# Patient Record
Sex: FEMALE | Race: WHITE | NOT HISPANIC OR LATINO | Employment: OTHER | URBAN - METROPOLITAN AREA
[De-identification: names, ages, dates, MRNs, and addresses within clinical notes are randomized per-mention and may not be internally consistent; named-entity substitution may affect disease eponyms.]

---

## 2021-03-22 ENCOUNTER — OFFICE VISIT (OUTPATIENT)
Dept: SPORTS MEDICINE | Facility: CLINIC | Age: 76
End: 2021-03-22

## 2021-03-22 VITALS
RESPIRATION RATE: 16 BRPM | DIASTOLIC BLOOD PRESSURE: 82 MMHG | OXYGEN SATURATION: 98 % | BODY MASS INDEX: 30.78 KG/M2 | TEMPERATURE: 98.6 F | WEIGHT: 163 LBS | SYSTOLIC BLOOD PRESSURE: 136 MMHG | HEIGHT: 61 IN | HEART RATE: 66 BPM

## 2021-03-22 DIAGNOSIS — M24.159 LABRAL TEAR OF HIP, DEGENERATIVE: ICD-10-CM

## 2021-03-22 DIAGNOSIS — M16.12 ARTHRITIS OF LEFT HIP: Primary | ICD-10-CM

## 2021-03-22 PROCEDURE — 99204 OFFICE O/P NEW MOD 45 MIN: CPT | Performed by: FAMILY MEDICINE

## 2021-03-22 RX ORDER — OLANZAPINE 5 MG/1
5 TABLET ORAL NIGHTLY
COMMUNITY
End: 2022-03-10

## 2021-03-22 RX ORDER — SERTRALINE HYDROCHLORIDE 100 MG/1
100 TABLET, FILM COATED ORAL DAILY
COMMUNITY
End: 2022-06-21 | Stop reason: SDUPTHER

## 2021-03-22 RX ORDER — RISEDRONATE SODIUM 150 MG/1
150 TABLET, FILM COATED ORAL
COMMUNITY
End: 2022-04-18 | Stop reason: SDUPTHER

## 2021-03-22 RX ORDER — POTASSIUM CHLORIDE 1500 MG/1
20 TABLET, FILM COATED, EXTENDED RELEASE ORAL DAILY
COMMUNITY
Start: 2021-01-06 | End: 2022-03-07

## 2021-03-22 RX ORDER — DILTIAZEM HYDROCHLORIDE 180 MG/1
180 CAPSULE, COATED, EXTENDED RELEASE ORAL DAILY
COMMUNITY
Start: 2021-03-18 | End: 2022-04-01 | Stop reason: SDUPTHER

## 2021-03-22 RX ORDER — ROSUVASTATIN CALCIUM 20 MG/1
20 TABLET, COATED ORAL DAILY
COMMUNITY
End: 2022-04-18 | Stop reason: SDUPTHER

## 2021-03-22 NOTE — PROGRESS NOTES
"Chief Complaint  Hip Pain (Left hip pain here today with MRI)    Subjective          Hilary FARIA presents to [facility] SPORTS MEDICINE  History of Present Illness  The past year patient has been having left anterior hip pain.  No known trauma.  She has occasionally felt like he is going to give way but she has not had any falls.  No locking.  No fever chills.  Patient saw her PCP earlier this month and an MRI was done on 3/15/2021 showing moderate arthritis with a degenerative labral tear.  Patient was referred here for further evaluation and treatment options.  Patient is on Xarelto and she has a history of type 2 diabetes.  She was taking tramadol for her pain but has since changed over to Tylenol.    Objective   Vital Signs:   /82 (BP Location: Left arm, Patient Position: Sitting, Cuff Size: Adult)   Pulse 66   Temp 98.6 °F (37 °C)   Resp 16   Ht 154.9 cm (61\")   Wt 73.9 kg (163 lb)   SpO2 98%   BMI 30.80 kg/m²     Physical Exam  Vitals reviewed.   Constitutional:       Appearance: She is well-developed.   HENT:      Head: Normocephalic and atraumatic.   Eyes:      Conjunctiva/sclera: Conjunctivae normal.      Pupils: Pupils are equal, round, and reactive to light.   Cardiovascular:      Comments: No peripheral edema  Pulmonary:      Effort: Pulmonary effort is normal.   Musculoskeletal:      Comments: Patient is able to get out of chair without any assistance, able to get onto exam table without complaint.  Left hip, negative logroll, patient does have anterior pain with external rotation but good range of motion, however her internal rotation motion is decreased and causes pain.  Equivocal FADIR.   Skin:     General: Skin is warm and dry.   Neurological:      Mental Status: She is alert and oriented to person, place, and time.   Psychiatric:         Behavior: Behavior normal.     Reviewed MRI images from 3/15/2021 of left hip showing moderate arthritis and possible degenerative " labral tear.  Result Review :                SCANNED - IMAGING (03/22/2021)    Assessment and Plan    Diagnoses and all orders for this visit:    1. Arthritis of left hip (Primary)    2. Labral tear of hip, degenerative    Other orders  -     Cancel: SCANNED - IMAGING  -     SCANNED - IMAGING    Treatment options were discussed with the patient, I do think the majority of her pain is coming from hip arthritis and the arthritis is caused a degenerative labral tear.  Treatment options as far as oral medications are going to be limited due to her use of Xarelto and having type 2 diabetes.  Have reviewed with the patient other options including physical therapy, ultrasound-guided intra-articular steroid injection left hip, or fluoroscopic guided steroid injection of the left hip.  Patient would like to try physical therapy first and then if a hip injection is needed she will consider having this done at [facility] under fluoroscopy.  A handwritten order for physical therapy was given to the patient and I have found a [facility] location that is close to her home in [place].  She will call when and if she needs steroid injection.    Follow Up   No follow-ups on file.  Patient was given instructions and counseling regarding her condition or for health maintenance advice. Please see specific information pulled into the AVS if appropriate.

## 2021-03-24 ENCOUNTER — TELEPHONE (OUTPATIENT)
Dept: SPORTS MEDICINE | Facility: CLINIC | Age: 76
End: 2021-03-24

## 2021-03-24 NOTE — TELEPHONE ENCOUNTER
Caller: DR. THORNTON    Relationship: REFERRING        What form or medical record are you requesting: OFFICE NOTE FROM 03/22/21     Who is requesting this form or medical record from you: REFERRING DR. Zahraa HERNANDEZ'S OFFICE  PLEASE FAX [phone]

## 2021-12-06 ENCOUNTER — LAB (OUTPATIENT)
Dept: LAB | Facility: HOSPITAL | Age: 76
End: 2021-12-06

## 2021-12-06 ENCOUNTER — TRANSCRIBE ORDERS (OUTPATIENT)
Dept: LAB | Facility: HOSPITAL | Age: 76
End: 2021-12-06

## 2021-12-06 DIAGNOSIS — R79.9 ABNORMAL BLOOD CHEMISTRY: Primary | ICD-10-CM

## 2021-12-06 DIAGNOSIS — R79.9 ABNORMAL BLOOD CHEMISTRY: ICD-10-CM

## 2021-12-06 PROCEDURE — 36415 COLL VENOUS BLD VENIPUNCTURE: CPT

## 2021-12-06 PROCEDURE — 80053 COMPREHEN METABOLIC PANEL: CPT

## 2021-12-07 LAB
ALBUMIN SERPL-MCNC: 4.1 G/DL (ref 3.5–5.2)
ALBUMIN/GLOB SERPL: 1.5 G/DL
ALP SERPL-CCNC: 80 U/L (ref 39–117)
ALT SERPL W P-5'-P-CCNC: 26 U/L (ref 1–33)
ANION GAP SERPL CALCULATED.3IONS-SCNC: 17 MMOL/L (ref 5–15)
AST SERPL-CCNC: 24 U/L (ref 1–32)
BILIRUB SERPL-MCNC: 0.4 MG/DL (ref 0–1.2)
BUN SERPL-MCNC: 14 MG/DL (ref 8–23)
BUN/CREAT SERPL: 9 (ref 7–25)
CALCIUM SPEC-SCNC: 9.2 MG/DL (ref 8.6–10.5)
CHLORIDE SERPL-SCNC: 98 MMOL/L (ref 98–107)
CO2 SERPL-SCNC: 24 MMOL/L (ref 22–29)
CREAT SERPL-MCNC: 1.56 MG/DL (ref 0.57–1)
GFR SERPL CREATININE-BSD FRML MDRD: 32 ML/MIN/1.73
GLOBULIN UR ELPH-MCNC: 2.7 GM/DL
GLUCOSE SERPL-MCNC: 260 MG/DL (ref 65–99)
POTASSIUM SERPL-SCNC: 2.7 MMOL/L (ref 3.5–5.2)
PROT SERPL-MCNC: 6.8 G/DL (ref 6–8.5)
SODIUM SERPL-SCNC: 139 MMOL/L (ref 136–145)

## 2021-12-27 ENCOUNTER — TRANSCRIBE ORDERS (OUTPATIENT)
Dept: LAB | Facility: HOSPITAL | Age: 76
End: 2021-12-27

## 2021-12-27 ENCOUNTER — LAB (OUTPATIENT)
Dept: LAB | Facility: HOSPITAL | Age: 76
End: 2021-12-27

## 2021-12-27 DIAGNOSIS — E87.6 HYPOKALEMIA: ICD-10-CM

## 2021-12-27 DIAGNOSIS — E87.6 HYPOKALEMIA: Primary | ICD-10-CM

## 2021-12-27 LAB
ALBUMIN SERPL-MCNC: 3.3 G/DL (ref 3.5–5.2)
ALBUMIN/GLOB SERPL: 1.2 G/DL
ALP SERPL-CCNC: 71 U/L (ref 39–117)
ALT SERPL W P-5'-P-CCNC: 12 U/L (ref 1–33)
ANION GAP SERPL CALCULATED.3IONS-SCNC: 11.8 MMOL/L (ref 5–15)
AST SERPL-CCNC: 15 U/L (ref 1–32)
BILIRUB SERPL-MCNC: 0.4 MG/DL (ref 0–1.2)
BUN SERPL-MCNC: 14 MG/DL (ref 8–23)
BUN/CREAT SERPL: 9.6 (ref 7–25)
CALCIUM SPEC-SCNC: 9.1 MG/DL (ref 8.6–10.5)
CHLORIDE SERPL-SCNC: 109 MMOL/L (ref 98–107)
CO2 SERPL-SCNC: 20.2 MMOL/L (ref 22–29)
CREAT SERPL-MCNC: 1.46 MG/DL (ref 0.57–1)
GFR SERPL CREATININE-BSD FRML MDRD: 35 ML/MIN/1.73
GLOBULIN UR ELPH-MCNC: 2.8 GM/DL
GLUCOSE SERPL-MCNC: 193 MG/DL (ref 65–99)
POTASSIUM SERPL-SCNC: 3.1 MMOL/L (ref 3.5–5.2)
PROT SERPL-MCNC: 6.1 G/DL (ref 6–8.5)
SODIUM SERPL-SCNC: 141 MMOL/L (ref 136–145)

## 2021-12-27 PROCEDURE — 80053 COMPREHEN METABOLIC PANEL: CPT

## 2022-01-17 ENCOUNTER — OFFICE VISIT (OUTPATIENT)
Dept: CARDIOLOGY | Facility: CLINIC | Age: 77
End: 2022-01-17

## 2022-01-17 VITALS
WEIGHT: 150 LBS | HEIGHT: 62 IN | HEART RATE: 65 BPM | BODY MASS INDEX: 27.6 KG/M2 | SYSTOLIC BLOOD PRESSURE: 125 MMHG | DIASTOLIC BLOOD PRESSURE: 49 MMHG

## 2022-01-17 DIAGNOSIS — E78.2 HYPERLIPEMIA, MIXED: ICD-10-CM

## 2022-01-17 DIAGNOSIS — I48.91 ATRIAL FIBRILLATION, UNSPECIFIED TYPE: Primary | ICD-10-CM

## 2022-01-17 DIAGNOSIS — I10 ESSENTIAL HYPERTENSION: ICD-10-CM

## 2022-01-17 PROCEDURE — 99204 OFFICE O/P NEW MOD 45 MIN: CPT | Performed by: INTERNAL MEDICINE

## 2022-01-17 RX ORDER — LORATADINE 10 MG/1
10 TABLET ORAL DAILY
COMMUNITY
Start: 2021-10-02 | End: 2022-03-10

## 2022-01-17 RX ORDER — MEMANTINE HYDROCHLORIDE AND DONEPEZIL HYDROCHLORIDE 14; 10 MG/1; MG/1
14 CAPSULE ORAL DAILY
COMMUNITY
Start: 2021-10-05 | End: 2022-03-10

## 2022-01-17 RX ORDER — ACETAMINOPHEN 160 MG
2000 TABLET,DISINTEGRATING ORAL DAILY
COMMUNITY
Start: 2021-11-24 | End: 2022-04-18 | Stop reason: SDUPTHER

## 2022-01-17 RX ORDER — FERROUS SULFATE 325(65) MG
1 TABLET ORAL 2 TIMES DAILY
COMMUNITY
Start: 2021-12-21 | End: 2022-02-17

## 2022-01-17 RX ORDER — FUROSEMIDE 20 MG/1
20 TABLET ORAL DAILY PRN
COMMUNITY
End: 2022-03-07

## 2022-01-17 NOTE — PROGRESS NOTES
Chief Complaint  Atrial Fibrillation, Hypertension, and Hyperlipidemia    Subjective            Hilary FARIA presents to [facility] C[facility]IOLOG[facility]  History of present illness:    Patient is a 76-year-old female with past medical history significant for paroxysmal atrial fibrillation, hypertension, and hyperlipidemia.  She states she was diagnosed with the atrial fibrillation back in the 80s.  The last time she felt palpitations was 1 year ago.  She does know it when she goes into the atrial fibrillation.  She was cardioverted one time back in the 2000's.  She is taking the Xarelto every day and notes no significant bleeding problems.  She is fairly active for her age but does not have a regular exercise program.  She notes no exertional chest pain.  She does note a little shortness of breath if she walks a long distance but she states that this has gotten better.      Past Medical History:   Diagnosis Date   • Atrial fibrillation (HCC)    • Hyperlipidemia    • Hypertension            History reviewed. No pertinent surgical history.       Social History     Socioeconomic History   • Marital status: Single   Tobacco Use   • Smoking status: Never Smoker   • Smokeless tobacco: Never Used   Vaping Use   • Vaping Use: Never used   Substance and Sexual Activity   • Alcohol use: Never   • Drug use: Never   • Sexual activity: Defer           Family History   Problem Relation Age of Onset   • Cancer Mother    • Diabetes Mother    • Cancer Father    • Heart failure Brother             Allergies   Allergen Reactions   • Donepezil Nausea Only   • Sulfa Antibiotics Hives            Current Outpatient Medications:   •  Cholecalciferol (Vitamin D3) 50 MCG (2000 UT) capsule, Take 2,000 Units by mouth Daily., Disp: , Rfl:   •  dilTIAZem CD (CARDIZEM CD) 180 MG 24 hr capsule, Take 180 mg by mouth Daily., Disp: , Rfl:   •  FeroSul 325 (65 Fe) MG tablet, Take 1 tablet by mouth 2 (Two) Times a Day., Disp: , Rfl:   •   "furosemide (LASIX) 40 MG tablet, 40 mg Daily. Taking 20mg daily, Disp: , Rfl:   •  loratadine (CLARITIN) 10 MG tablet, 10 mg Daily., Disp: , Rfl:   •  Memantine HCl-Donepezil HCl (Namzaric) 14-10 MG capsule sustained-release 24 hr, 14 mg Daily., Disp: , Rfl:   •  metFORMIN (GLUCOPHAGE) 500 MG tablet, metformin 500 mg tablet  TAKE 1 TABLET BY MOUTH TWICE DAILY, Disp: , Rfl:   •  OLANZapine (zyPREXA) 5 MG tablet, olanzapine 5 mg tablet  TAKE 1 TABLET BY MOUTH EVERY DAY, Disp: , Rfl:   •  potassium chloride ER (K-TAB) 20 MEQ tablet controlled-release ER tablet, Take 20 mEq by mouth Daily. with food, Disp: , Rfl:   •  risedronate (Actonel) 150 MG tablet, Actonel 150 mg tablet  Take 1 tablet every month by oral route., Disp: , Rfl:   •  rivaroxaban (Xarelto) 20 MG tablet, Xarelto 20 mg tablet  TAKE 1 TABLET BY MOUTH EVERY DAY, Disp: , Rfl:   •  rosuvastatin (CRESTOR) 20 MG tablet, rosuvastatin 20 mg tablet  TAKE 1 TABLET BY MOUTH EVERY DAY, Disp: , Rfl:   •  sertraline (ZOLOFT) 100 MG tablet, 100 mg Daily. Takes 1 tablet daily, Disp: , Rfl:       ROS:  Cardiac review of systems is negative.    Objective     /49   Pulse 65   Ht 157.5 cm (62\")   Wt 68 kg (150 lb)   BMI 27.44 kg/m²       General Appearance:   · well developed  · well nourished  HENT:   · oropharynx moist  · lips not cyanotic  Respiratory:  · no respiratory distress  · normal breath sounds  · no rales  Cardiovascular:  · no jugular venous distention  · regular rhythm  · S1 normal, S2 normal  · no S3, no S4   · no murmur  · no rub, no thrill  · No carotid bruit  · pedal pulses normal  · lower extremity edema: none    Musculoskeletal:  · no clubbing of fingers.   · normocephalic, head atraumatic  Skin:   · warm, dry  Psychiatric:  · judgement and insight appropriate  · normal mood and affect          Procedures                      ASSESSMENT:  Encounter Diagnoses   Name Primary?   • Atrial fibrillation, unspecified type (HCC) Yes   • Hyperlipemia, " mixed    • Essential hypertension          PLAN:    1.  We will get records from previous cardiologist including any echocardiograms and EKGs.  2.  Agree with the Xarelto.  Her [id number] score is at least 3.  3.  Blood pressures under excellent control with the diltiazem.  4.  Continue the Crestor.  5.  Patient was put on Lasix 40 mg and had a very low potassium.  She had her Lasix cut to 20 mg and was put on potassium supplementation.  She has no real swelling today.  I did tell her she could probably go to just taking the Lasix as needed with the potassium at that time.  6.  Patient overall is doing very well.  I did ask her to start a regular walking program.  We will see back in 6 months sooner if needed.          Patient was given instructions and counseling regarding her condition or for health maintenance advice. Please see specific information pulled into the AVS if appropriate.         Elias Doughetry MD   1/17/2022  14:57 EST

## 2022-02-03 PROBLEM — I48.11 LONGSTANDING PERSISTENT ATRIAL FIBRILLATION: Status: ACTIVE | Noted: 2022-02-03

## 2022-02-03 PROBLEM — E55.9 VITAMIN D DEFICIENCY: Status: ACTIVE | Noted: 2022-02-03

## 2022-02-03 PROBLEM — M15.0 PRIMARY OSTEOARTHRITIS INVOLVING MULTIPLE JOINTS: Status: ACTIVE | Noted: 2022-02-03

## 2022-02-03 PROBLEM — R93.89 ABNORMAL MAGNETIC RESONANCE IMAGING STUDY: Status: ACTIVE | Noted: 2021-03-24

## 2022-02-03 PROBLEM — E78.2 MIXED HYPERLIPIDEMIA: Status: ACTIVE | Noted: 2022-02-03

## 2022-02-03 PROBLEM — E11.9 TYPE 2 DIABETES MELLITUS WITHOUT COMPLICATION, WITHOUT LONG-TERM CURRENT USE OF INSULIN: Status: ACTIVE | Noted: 2022-02-03

## 2022-02-03 PROBLEM — D50.9 IRON DEFICIENCY ANEMIA: Status: ACTIVE | Noted: 2022-02-03

## 2022-02-03 PROBLEM — R26.89 SHUFFLING GAIT: Status: ACTIVE | Noted: 2021-05-03

## 2022-02-03 PROBLEM — F33.41 RECURRENT MAJOR DEPRESSION IN PARTIAL REMISSION: Status: ACTIVE | Noted: 2022-02-03

## 2022-02-03 PROBLEM — M81.0 AGE-RELATED OSTEOPOROSIS WITHOUT CURRENT PATHOLOGICAL FRACTURE: Status: ACTIVE | Noted: 2022-02-03

## 2022-02-03 PROBLEM — I10 PRIMARY HYPERTENSION: Status: ACTIVE | Noted: 2022-02-03

## 2022-02-03 PROBLEM — M15.9 PRIMARY OSTEOARTHRITIS INVOLVING MULTIPLE JOINTS: Status: ACTIVE | Noted: 2022-02-03

## 2022-02-13 PROBLEM — D50.8 IRON DEFICIENCY ANEMIA SECONDARY TO INADEQUATE DIETARY IRON INTAKE: Status: ACTIVE | Noted: 2022-02-03

## 2022-02-14 ENCOUNTER — OFFICE VISIT (OUTPATIENT)
Dept: INTERNAL MEDICINE | Facility: CLINIC | Age: 77
End: 2022-02-14

## 2022-02-14 VITALS
DIASTOLIC BLOOD PRESSURE: 71 MMHG | BODY MASS INDEX: 27.64 KG/M2 | TEMPERATURE: 97.2 F | OXYGEN SATURATION: 98 % | HEIGHT: 62 IN | SYSTOLIC BLOOD PRESSURE: 132 MMHG | HEART RATE: 85 BPM | WEIGHT: 150.2 LBS

## 2022-02-14 DIAGNOSIS — Z99.89 OSA ON CPAP: ICD-10-CM

## 2022-02-14 DIAGNOSIS — D50.8 IRON DEFICIENCY ANEMIA SECONDARY TO INADEQUATE DIETARY IRON INTAKE: ICD-10-CM

## 2022-02-14 DIAGNOSIS — Z00.00 WELL ADULT EXAM: ICD-10-CM

## 2022-02-14 DIAGNOSIS — G47.33 OSA ON CPAP: ICD-10-CM

## 2022-02-14 DIAGNOSIS — M81.0 AGE-RELATED OSTEOPOROSIS WITHOUT CURRENT PATHOLOGICAL FRACTURE: ICD-10-CM

## 2022-02-14 DIAGNOSIS — R06.02 SHORTNESS OF BREATH: ICD-10-CM

## 2022-02-14 DIAGNOSIS — M81.0 POSTMENOPAUSAL BONE LOSS: ICD-10-CM

## 2022-02-14 DIAGNOSIS — I48.11 LONGSTANDING PERSISTENT ATRIAL FIBRILLATION: Primary | ICD-10-CM

## 2022-02-14 DIAGNOSIS — E11.9 TYPE 2 DIABETES MELLITUS WITHOUT COMPLICATION, WITHOUT LONG-TERM CURRENT USE OF INSULIN: ICD-10-CM

## 2022-02-14 DIAGNOSIS — E55.9 VITAMIN D DEFICIENCY: ICD-10-CM

## 2022-02-14 DIAGNOSIS — E78.2 MIXED HYPERLIPIDEMIA: ICD-10-CM

## 2022-02-14 DIAGNOSIS — I10 PRIMARY HYPERTENSION: ICD-10-CM

## 2022-02-14 DIAGNOSIS — F03.90 MAJOR NEUROCOGNITIVE DISORDER: ICD-10-CM

## 2022-02-14 DIAGNOSIS — F33.41 RECURRENT MAJOR DEPRESSION IN PARTIAL REMISSION: ICD-10-CM

## 2022-02-14 DIAGNOSIS — M15.9 PRIMARY OSTEOARTHRITIS INVOLVING MULTIPLE JOINTS: ICD-10-CM

## 2022-02-14 PROCEDURE — 99204 OFFICE O/P NEW MOD 45 MIN: CPT | Performed by: INTERNAL MEDICINE

## 2022-02-14 NOTE — ASSESSMENT & PLAN NOTE
Patient been diagnosed with this previously, she has CPAP but not compliant with therapy.  Discussed with family least need to know if she is getting hypoxic at night, perhaps just simple nasal cannula would be enough to offset that.

## 2022-02-14 NOTE — ASSESSMENT & PLAN NOTE
Patient's been maintained on moderate dose Crestor for some time now, will get labs here shortly and make further recommendations at that time.  Otherwise continue current dose.

## 2022-02-14 NOTE — ASSESSMENT & PLAN NOTE
Is very stable as of her 2/22 office visit.  She is on Xarelto for anticoagulation as well as Cardizem for rate control.  She is maintained in sinus rhythm actually, the last time she knows at least that she was in A. fib was about a year ago.  Continue current medications as well as follow-up with cardiology as scheduled.

## 2022-02-14 NOTE — ASSESSMENT & PLAN NOTE
Fortunately patient no significant pains at this time, we just reviewed that Tylenol is the only thing that can be taken over-the-counter if she gets some in the future given her Xarelto use.

## 2022-02-14 NOTE — ASSESSMENT & PLAN NOTE
Patient to continue current low-dose of over-the-counter supplementation with vitamin D3, repeat level on return to office.

## 2022-02-14 NOTE — ASSESSMENT & PLAN NOTE
Patient was seen in the  clinic by Dr. Kong as noted above.  She is currently maintained on Namzaric 14/10, as well as Zyprexa.  Previous had significant GI intolerances when she was started on donepezil, but currently just with some belching may be with the Namzaric.  Certainly difficult to say in regards to Zyprexa as well as the Namzaric if both are still needed and/or causing some cognitive issues themselves.  I would recommend that we try to wean the Zyprexa initially and stick with the Namzaric for now.  Discussed with them to cut the Zyprexa in half until she returns the office next month.

## 2022-02-14 NOTE — ASSESSMENT & PLAN NOTE
Looks like patient's last BMD was in 2019, she has been tolerating Actonel monthly without any abdominal issues, we will set up BMD in the near future.

## 2022-02-14 NOTE — PROGRESS NOTES
"Chief Complaint  Establish Care (Pt has doctor in [place], she is new to [facility])    Subjective      Hilary Xie presents to [facility] INTER[facility] MEDICINE    History of Present Illness  Patient pleasant 76-year-old female with underlying PAF, DM, osteoporosis on treatment, among others, who is coming in 2/22 as a new patient.  We have reviewed all her routine medications, gone over her major problems, but no labs are available at this time.  We are ordering comprehensive labs and will follow up on them in just a few weeks.  Other concerns are being addressed as well.    Review of Systems   Constitutional: Negative for appetite change, fatigue and fever.   HENT: Negative for congestion and ear pain.    Eyes: Negative for blurred vision.   Respiratory: Negative for cough, chest tightness, shortness of breath and wheezing.    Cardiovascular: Negative for chest pain, palpitations and leg swelling.   Gastrointestinal: Negative for abdominal pain.   Genitourinary: Negative for difficulty urinating, dysuria and hematuria.   Musculoskeletal: Negative for arthralgias and gait problem.   Skin: Negative for skin lesions.   Neurological: Negative for syncope, memory problem and confusion.   Psychiatric/Behavioral: Negative for self-injury and depressed mood.       Objective   Vital Signs:   /71   Pulse 85   Temp 97.2 °F (36.2 °C)   Ht 157.5 cm (62.01\")   Wt 68.1 kg (150 lb 3.2 oz)   SpO2 98%   BMI 27.46 kg/m²           Physical Exam  Vitals and nursing note reviewed.   Constitutional:       General: She is not in acute distress.     Appearance: Normal appearance. She is not toxic-appearing.   HENT:      Head: Atraumatic.      Right Ear: External ear normal.      Left Ear: External ear normal.      Nose: Nose normal.      Mouth/Throat:      Mouth: Mucous membranes are moist.   Eyes:      General:         Right eye: No discharge.         Left eye: No discharge.      Extraocular Movements: " Extraocular movements intact.      Pupils: Pupils are equal, round, and reactive to light.   Cardiovascular:      Rate and Rhythm: Normal rate and regular rhythm.      Pulses: Normal pulses.      Heart sounds: Normal heart sounds. No murmur heard.  No gallop.    Pulmonary:      Effort: Pulmonary effort is normal. No respiratory distress.      Breath sounds: No wheezing, rhonchi or rales.   Abdominal:      General: There is no distension.      Palpations: Abdomen is soft. There is no mass.      Tenderness: There is no abdominal tenderness. There is no guarding.   Musculoskeletal:         General: No swelling or tenderness.      Cervical back: No tenderness.      Right lower leg: No edema.      Left lower leg: No edema.   Skin:     General: Skin is warm and dry.      Findings: No rash.   Neurological:      General: No focal deficit present.      Mental Status: She is alert and oriented to person, place, and time. Mental status is at baseline.      Motor: No weakness.      Gait: Gait normal.   Psychiatric:         Mood and Affect: Mood normal.         Thought Content: Thought content normal.          Result Review   The following data was reviewed by: Clarence Martínez MD on 02/14/2022:  [x] Laboratory  [] Microbiology  [] Radiology  [] EKG/telemetry  [] Cardiology/Vascular  [] Pathology  [x] Old records             Assessment and Plan   Diagnoses and all orders for this visit:    1. Longstanding persistent atrial fibrillation (HCC) (Primary)  Overview:  Since 1980's.  Cardioverted x1.  More c/w PAF.  Followed by Dr. Chamberlain.      Assessment & Plan:  Is very stable as of her 2/22 office visit.  She is on Xarelto for anticoagulation as well as Cardizem for rate control.  She is maintained in sinus rhythm actually, the last time she knows at least that she was in A. fib was about a year ago.  Continue current medications as well as follow-up with cardiology as scheduled.      2. Primary hypertension  Assessment & Plan:  Blood  pressure appears well controlled as of her 2/22 office visit.  She is maintained on low-dose of Cardizem, and that is about all right now.  She was on a little bit of a Lasix for some peripheral edema, but is stable off of that.  Continue same treatment no changes indicated at this time at least.  Of note, if blood pressure does trend up, she would require ACEI/ARB given her underlying diabetes.    Orders:  -     Comprehensive Metabolic Panel; Future    3. Recurrent major depression in partial remission (HCC)  Assessment & Plan:  Patient appears stable as of 2/22 office visit on current dose of sertraline.  Higher dose because of somewhat flat affect, patient improved on 100 mg.  Certainly reasonable to continue current plan of care there.    Orders:  -     DEXA Bone Density Axial; Future    4. Type 2 diabetes mellitus without complication, without long-term current use of insulin (HCC)  Assessment & Plan:  Patient been on Metformin for the past 10 years or so apparently.  He has been weaned from 2000 down to 500 daily, and her at last A1c was around 6.9.  Certainly excellent control, will confirm that here with the blood test shortly, otherwise continue current low dose.    Orders:  -     Hemoglobin A1c; Future  -     Microalbumin / Creatinine Urine Ratio - Urine, Clean Catch; Future    5. Mixed hyperlipidemia  Assessment & Plan:  Patient's been maintained on moderate dose Crestor for some time now, will get labs here shortly and make further recommendations at that time.  Otherwise continue current dose.    Orders:  -     Lipid Panel; Future    6. Vitamin D deficiency  Assessment & Plan:  Patient to continue current low-dose of over-the-counter supplementation with vitamin D3, repeat level on return to office.    Orders:  -     Vitamin D 1,25 Dihydroxy; Future    7. Age-related osteoporosis without current pathological fracture  Assessment & Plan:  Looks like patient's last BMD was in 2019, she has been tolerating  Actonel monthly without any abdominal issues, we will set up BMD in the near future.      8. Primary osteoarthritis involving multiple joints  Overview:  Had MRI of L Hip in 3/21 with tear of labrum.  Previously requiring ultram qid.    Assessment & Plan:  Fortunately patient no significant pains at this time, we just reviewed that Tylenol is the only thing that can be taken over-the-counter if she gets some in the future given her Xarelto use.      9. Iron deficiency anemia secondary to inadequate dietary iron intake  Overview:  EGD 2021 with no significant findings reported.  COLON 2019 was negative = no more.    Assessment & Plan:  Patient with a history of iron deficiency anemia and IV iron required in the past.  Currently no signs of any bleeding, she is on oral iron, we will check the levels here shortly.  Otherwise continue current dose for now.    Orders:  -     Ferritin; Future  -     CBC & Differential; Future  -     Iron Profile; Future    10. Well adult exam  -     Hepatitis C antibody; Future  -     TSH+Free T4; Future    11. Postmenopausal bone loss  -     DEXA Bone Density Axial; Future    12. Major neurocognitive disorder (HCC)  Overview:  Dewayne Kong MD  = note from 10/21 was reviewed.    Assessment & Plan:  Patient was seen in the  clinic by Dr. Kong as noted above.  She is currently maintained on Namzaric 14/10, as well as Zyprexa.  Previous had significant GI intolerances when she was started on donepezil, but currently just with some belching may be with the Namzaric.  Certainly difficult to say in regards to Zyprexa as well as the Namzaric if both are still needed and/or causing some cognitive issues themselves.  I would recommend that we try to wean the Zyprexa initially and stick with the Namzaric for now.  Discussed with them to cut the Zyprexa in half until she returns the office next month.      13. Shortness of breath  -     BNP; Future    14. MARY on CPAP  Assessment &  Plan:  Patient been diagnosed with this previously, she has CPAP but not compliant with therapy.  Discussed with family least need to know if she is getting hypoxic at night, perhaps just simple nasal cannula would be enough to offset that.             Follow Up   Return in about 4 weeks (around 3/14/2022).  Patient was given instructions and counseling regarding her condition or for health maintenance advice. Please see specific information pulled into the AVS if appropriate.

## 2022-02-14 NOTE — ASSESSMENT & PLAN NOTE
Patient appears stable as of 2/22 office visit on current dose of sertraline.  Higher dose because of somewhat flat affect, patient improved on 100 mg.  Certainly reasonable to continue current plan of care there.

## 2022-02-14 NOTE — ASSESSMENT & PLAN NOTE
Blood pressure appears well controlled as of her 2/22 office visit.  She is maintained on low-dose of Cardizem, and that is about all right now.  She was on a little bit of a Lasix for some peripheral edema, but is stable off of that.  Continue same treatment no changes indicated at this time at least.  Of note, if blood pressure does trend up, she would require ACEI/ARB given her underlying diabetes.

## 2022-02-14 NOTE — ASSESSMENT & PLAN NOTE
Patient been on Metformin for the past 10 years or so apparently.  He has been weaned from 2000 down to 500 daily, and her at last A1c was around 6.9.  Certainly excellent control, will confirm that here with the blood test shortly, otherwise continue current low dose.

## 2022-02-14 NOTE — ASSESSMENT & PLAN NOTE
Patient with a history of iron deficiency anemia and IV iron required in the past.  Currently no signs of any bleeding, she is on oral iron, we will check the levels here shortly.  Otherwise continue current dose for now.

## 2022-02-17 ENCOUNTER — PATIENT ROUNDING (BHMG ONLY) (OUTPATIENT)
Dept: INTERNAL MEDICINE | Facility: CLINIC | Age: 77
End: 2022-02-17

## 2022-02-17 ENCOUNTER — LAB (OUTPATIENT)
Dept: LAB | Facility: HOSPITAL | Age: 77
End: 2022-02-17

## 2022-02-17 DIAGNOSIS — R06.02 SHORTNESS OF BREATH: ICD-10-CM

## 2022-02-17 DIAGNOSIS — E55.9 VITAMIN D DEFICIENCY: ICD-10-CM

## 2022-02-17 DIAGNOSIS — D50.8 IRON DEFICIENCY ANEMIA SECONDARY TO INADEQUATE DIETARY IRON INTAKE: ICD-10-CM

## 2022-02-17 DIAGNOSIS — E78.2 MIXED HYPERLIPIDEMIA: ICD-10-CM

## 2022-02-17 DIAGNOSIS — Z00.00 WELL ADULT EXAM: ICD-10-CM

## 2022-02-17 DIAGNOSIS — I10 PRIMARY HYPERTENSION: ICD-10-CM

## 2022-02-17 DIAGNOSIS — E11.9 TYPE 2 DIABETES MELLITUS WITHOUT COMPLICATION, WITHOUT LONG-TERM CURRENT USE OF INSULIN: ICD-10-CM

## 2022-02-17 LAB
ALBUMIN SERPL-MCNC: 3.6 G/DL (ref 3.5–5.2)
ALBUMIN UR-MCNC: <1.2 MG/DL
ALBUMIN/GLOB SERPL: 1.1 G/DL
ALP SERPL-CCNC: 79 U/L (ref 39–117)
ALT SERPL W P-5'-P-CCNC: 8 U/L (ref 1–33)
ANION GAP SERPL CALCULATED.3IONS-SCNC: 10.4 MMOL/L (ref 5–15)
AST SERPL-CCNC: 13 U/L (ref 1–32)
BASOPHILS # BLD AUTO: 0.04 10*3/MM3 (ref 0–0.2)
BASOPHILS NFR BLD AUTO: 0.6 % (ref 0–1.5)
BILIRUB SERPL-MCNC: 0.3 MG/DL (ref 0–1.2)
BUN SERPL-MCNC: 17 MG/DL (ref 8–23)
BUN/CREAT SERPL: 12.6 (ref 7–25)
CALCIUM SPEC-SCNC: 9.4 MG/DL (ref 8.6–10.5)
CHLORIDE SERPL-SCNC: 109 MMOL/L (ref 98–107)
CHOLEST SERPL-MCNC: 191 MG/DL (ref 0–200)
CO2 SERPL-SCNC: 21.6 MMOL/L (ref 22–29)
CREAT SERPL-MCNC: 1.35 MG/DL (ref 0.57–1)
CREAT UR-MCNC: 104.4 MG/DL
DEPRECATED RDW RBC AUTO: 40.6 FL (ref 37–54)
EOSINOPHIL # BLD AUTO: 0.21 10*3/MM3 (ref 0–0.4)
EOSINOPHIL NFR BLD AUTO: 3.3 % (ref 0.3–6.2)
ERYTHROCYTE [DISTWIDTH] IN BLOOD BY AUTOMATED COUNT: 12.6 % (ref 12.3–15.4)
FERRITIN SERPL-MCNC: 33.9 NG/ML (ref 13–150)
GFR SERPL CREATININE-BSD FRML MDRD: 38 ML/MIN/1.73
GLOBULIN UR ELPH-MCNC: 3.4 GM/DL
GLUCOSE SERPL-MCNC: 121 MG/DL (ref 65–99)
HBA1C MFR BLD: 6.3 % (ref 4.8–5.6)
HCT VFR BLD AUTO: 36.1 % (ref 34–46.6)
HCV AB SER DONR QL: NORMAL
HDLC SERPL-MCNC: 64 MG/DL (ref 40–60)
HGB BLD-MCNC: 11.6 G/DL (ref 12–15.9)
IMM GRANULOCYTES # BLD AUTO: 0.02 10*3/MM3 (ref 0–0.05)
IMM GRANULOCYTES NFR BLD AUTO: 0.3 % (ref 0–0.5)
IRON 24H UR-MRATE: 49 MCG/DL (ref 37–145)
IRON SATN MFR SERPL: 13 % (ref 20–50)
LDLC SERPL CALC-MCNC: 109 MG/DL (ref 0–100)
LDLC/HDLC SERPL: 1.67 {RATIO}
LYMPHOCYTES # BLD AUTO: 2.12 10*3/MM3 (ref 0.7–3.1)
LYMPHOCYTES NFR BLD AUTO: 33.4 % (ref 19.6–45.3)
MCH RBC QN AUTO: 28.4 PG (ref 26.6–33)
MCHC RBC AUTO-ENTMCNC: 32.1 G/DL (ref 31.5–35.7)
MCV RBC AUTO: 88.5 FL (ref 79–97)
MICROALBUMIN/CREAT UR: NORMAL MG/G{CREAT}
MONOCYTES # BLD AUTO: 0.36 10*3/MM3 (ref 0.1–0.9)
MONOCYTES NFR BLD AUTO: 5.7 % (ref 5–12)
NEUTROPHILS NFR BLD AUTO: 3.59 10*3/MM3 (ref 1.7–7)
NEUTROPHILS NFR BLD AUTO: 56.7 % (ref 42.7–76)
NRBC BLD AUTO-RTO: 0 /100 WBC (ref 0–0.2)
NT-PROBNP SERPL-MCNC: 536 PG/ML (ref 0–1800)
PLATELET # BLD AUTO: 205 10*3/MM3 (ref 140–450)
PMV BLD AUTO: 12.1 FL (ref 6–12)
POTASSIUM SERPL-SCNC: 3.9 MMOL/L (ref 3.5–5.2)
PROT SERPL-MCNC: 7 G/DL (ref 6–8.5)
RBC # BLD AUTO: 4.08 10*6/MM3 (ref 3.77–5.28)
SODIUM SERPL-SCNC: 141 MMOL/L (ref 136–145)
T4 FREE SERPL-MCNC: 0.99 NG/DL (ref 0.93–1.7)
TIBC SERPL-MCNC: 375 MCG/DL (ref 298–536)
TRANSFERRIN SERPL-MCNC: 252 MG/DL (ref 200–360)
TRIGL SERPL-MCNC: 101 MG/DL (ref 0–150)
TSH SERPL DL<=0.05 MIU/L-ACNC: 5.36 UIU/ML (ref 0.27–4.2)
VLDLC SERPL-MCNC: 18 MG/DL (ref 5–40)
WBC NRBC COR # BLD: 6.34 10*3/MM3 (ref 3.4–10.8)

## 2022-02-17 PROCEDURE — 84443 ASSAY THYROID STIM HORMONE: CPT

## 2022-02-17 PROCEDURE — 84439 ASSAY OF FREE THYROXINE: CPT

## 2022-02-17 PROCEDURE — 84466 ASSAY OF TRANSFERRIN: CPT

## 2022-02-17 PROCEDURE — 82043 UR ALBUMIN QUANTITATIVE: CPT

## 2022-02-17 PROCEDURE — 83880 ASSAY OF NATRIURETIC PEPTIDE: CPT

## 2022-02-17 PROCEDURE — 80061 LIPID PANEL: CPT

## 2022-02-17 PROCEDURE — 82570 ASSAY OF URINE CREATININE: CPT

## 2022-02-17 PROCEDURE — 83540 ASSAY OF IRON: CPT

## 2022-02-17 PROCEDURE — 85025 COMPLETE CBC W/AUTO DIFF WBC: CPT

## 2022-02-17 PROCEDURE — 82728 ASSAY OF FERRITIN: CPT

## 2022-02-17 PROCEDURE — 36415 COLL VENOUS BLD VENIPUNCTURE: CPT

## 2022-02-17 PROCEDURE — 80053 COMPREHEN METABOLIC PANEL: CPT

## 2022-02-17 PROCEDURE — 82652 VIT D 1 25-DIHYDROXY: CPT

## 2022-02-17 PROCEDURE — 86803 HEPATITIS C AB TEST: CPT

## 2022-02-17 PROCEDURE — 83036 HEMOGLOBIN GLYCOSYLATED A1C: CPT

## 2022-02-17 RX ORDER — FERROUS SULFATE 325(65) MG
TABLET ORAL
Qty: 60 TABLET | Refills: 5 | Status: SHIPPED | OUTPATIENT
Start: 2022-02-17 | End: 2022-04-18 | Stop reason: SDUPTHER

## 2022-02-17 NOTE — PROGRESS NOTES
February 17, 2022    Hello, may I speak with Hilary Anne-Marie?    My name is Aliza Collazo    I am  with [facility] ELINA GARCIA HENRY  [facility] INTERNAL MEDICINE  [address] [address]  [facility] [address]    Before we get started may I verify your date of birth? 1945    I am calling to officially welcome you to our practice and ask about your recent visit. Is this a good time to talk? yes    Tell me about your visit with us. What things went well?  Went well, no complaints       We're always looking for ways to make our patients' experiences even better. Do you have recommendations on ways we may improve?  no    Overall were you satisfied with your first visit to our practice? yes       I appreciate you taking the time to speak with me today. Is there anything else I can do for you? no      Thank you, and have a great day.

## 2022-02-20 LAB — 1,25(OH)2D SERPL-MCNC: 49.5 PG/ML (ref 19.9–79.3)

## 2022-03-02 ENCOUNTER — TELEPHONE (OUTPATIENT)
Dept: INTERNAL MEDICINE | Facility: CLINIC | Age: 77
End: 2022-03-02

## 2022-03-02 NOTE — TELEPHONE ENCOUNTER
Caller: MITCHEL    Best call back number: [phone]    Patient is needing: MITCHEL HAS CALLED TO INFORM DR. WALLER THE PATIENT HAS REACHED OUT TO THEM MULTIPLE TIMES REQUESTING STATUS OF THE ORDER FOR OVERNIGHT OXYGEN TEST.        MITCHEL IS REQUESTING THE PATIENT BE ADVISED OF ORDER.

## 2022-03-02 NOTE — TELEPHONE ENCOUNTER
Caller: Hilary Xie    Relationship: Self    Best call back number: [phone]    What medication are you requesting: SLEEPING MEDICATION     What are your current symptoms: UNABLE TO SLEEP    How long have you been experiencing symptoms: 1 WEEK    Have you had these symptoms before:    [] Yes  [x] No    Have you been treated for these symptoms before:   [] Yes  [x] No    If a prescription is needed, what is your preferred pharmacy and phone number: [facility] - [place] - [address] - [phone]  - [phone]

## 2022-03-02 NOTE — TELEPHONE ENCOUNTER
Called patient an notified her that she would have to be seen in the office for this. Patient has appt coming up on Monday, pt stated she will discuss with dr chu then.

## 2022-03-02 NOTE — TELEPHONE ENCOUNTER
Caller: Hilary Xie    Relationship: Self    Best call back number: [phone]    What orders are you requesting (i.e. lab or imaging): SLEEP STUDY     In what timeframe would the patient need to come in: AS SOON AS POSSIBLE.

## 2022-03-03 DIAGNOSIS — G47.33 OSA ON CPAP: Primary | ICD-10-CM

## 2022-03-03 DIAGNOSIS — Z99.89 OSA ON CPAP: Primary | ICD-10-CM

## 2022-03-03 DIAGNOSIS — G47.33 OSA (OBSTRUCTIVE SLEEP APNEA): ICD-10-CM

## 2022-03-03 NOTE — TELEPHONE ENCOUNTER
PATIENT DAUGHTER IN LAW, IS REQUESTING INFORMATION REGARDING THE ORDER , HUB WAS UNABLE TO WARM TRANSFER.

## 2022-03-06 NOTE — PROGRESS NOTES
"Chief Complaint  Follow-up (Follow up for blood work, wants something for sleep. Pt hasnt slept for 2 weeks. Appetite hasnt been well either. Pt has had tramadol in the past. Pt cant remember if it helped. )    Subjective      Hilary Xie presents to [facility]    History of Present Illness  Patient pleasant 76-year-old female with underlying PAF, DM, osteoporosis on treatment, among others, who was seen 2/22 as a new pt and who is coming in 3/22 for further follow-up.  We will review all her routine medications, go over her labs in detail, and address any new concerns as time permits.    Review of Systems   Constitutional: Negative for appetite change, fatigue and fever.   HENT: Negative for congestion and ear pain.    Eyes: Negative for blurred vision.   Respiratory: Negative for cough, chest tightness, shortness of breath and wheezing.    Cardiovascular: Negative for chest pain, palpitations and leg swelling.   Gastrointestinal: Negative for abdominal pain.   Genitourinary: Negative for difficulty urinating, dysuria and hematuria.   Musculoskeletal: Negative for arthralgias and gait problem.   Skin: Negative for skin lesions.   Neurological: Negative for syncope, memory problem and confusion.   Psychiatric/Behavioral: Negative for self-injury and depressed mood.       Objective   Vital Signs:   /67 (BP Location: Left arm, Patient Position: Sitting, Cuff Size: Adult)   Pulse 56   Temp 97.8 °F (36.6 °C) (Temporal)   Ht 62 cm (24.41\")   Wt 67.9 kg (149 lb 12.8 oz)   SpO2 96%   .71 kg/m²           Physical Exam  Vitals and nursing note reviewed.   Constitutional:       General: She is not in acute distress.     Appearance: Normal appearance. She is not toxic-appearing.   HENT:      Head: Atraumatic.      Right Ear: External ear normal.      Left Ear: External ear normal.      Nose: Nose normal.      Mouth/Throat:      Mouth: Mucous membranes are moist.   Eyes:    "   General:         Right eye: No discharge.         Left eye: No discharge.      Extraocular Movements: Extraocular movements intact.      Pupils: Pupils are equal, round, and reactive to light.   Cardiovascular:      Rate and Rhythm: Normal rate and regular rhythm.      Pulses: Normal pulses.      Heart sounds: Normal heart sounds. No murmur heard.    No gallop.   Pulmonary:      Effort: Pulmonary effort is normal. No respiratory distress.      Breath sounds: No wheezing, rhonchi or rales.   Abdominal:      General: There is no distension.      Palpations: Abdomen is soft. There is no mass.      Tenderness: There is no abdominal tenderness. There is no guarding.   Musculoskeletal:         General: No swelling or tenderness.      Cervical back: No tenderness.      Right lower leg: No edema.      Left lower leg: No edema.   Skin:     General: Skin is warm and dry.      Findings: No rash.   Neurological:      General: No focal deficit present.      Mental Status: She is alert and oriented to person, place, and time. Mental status is at baseline.      Motor: No weakness.      Gait: Gait normal.   Psychiatric:         Mood and Affect: Mood normal.         Thought Content: Thought content normal.          Result Review   The following data was reviewed by: Clarence Martínez MD on 02/14/2022:  [x] Laboratory  [] Microbiology  [] Radiology  [] EKG/telemetry  [] Cardiology/Vascular  [] Pathology  [x] Old records             Assessment and Plan   Diagnoses and all orders for this visit:    1. Longstanding persistent atrial fibrillation (HCC) (Primary)  Overview:  Since 1980's.  Cardioverted x1.  More c/w PAF.  Followed by Dr. Chamberlain.      Assessment & Plan:  This remains well controlled as of her 3/22 office visit.  Patient remains in sinus rhythm.  She is to continue Xarelto for anticoagulation as well as Cardizem for rate control.      2. Major neurocognitive disorder (HCC)  Overview:  Dewayne Kong MD  = note from 10/21 was  reviewed.    Assessment & Plan:  Does not appear patient has done well with the half dose of Zyprexa, perhaps has been interfering with her sleep, so we will resume the whole dose as of her 3/22 office visit.  Family to call in about a week to see how this is working, and will consider adding low-dose trazodone at that time to help with sleep and moods as well.      3. Mixed hyperlipidemia  Assessment & Plan:  LDL is 109 as of 3/22 office visit.  This is not exactly at goal for a diabetic, but I do want to make too many changes at one time.  Patient to continue with 20 mg of Crestor for now, will repeat these levels in the summer.      4. MARY on CPAP  Assessment & Plan:  We are just now getting this overnight O2 sat set up, hopefully will get the results here in the next week or so.  Asked to patient and family to call for the results please.      5. Primary hypertension  Assessment & Plan:  Blood pressure remains well controlled as of her 3/22 office visit.  She is stable on low-dose Cardizem only, and as noted previously, needs an ACEI/ARB if BP trends up given diabetes.      6. Recurrent major depression in partial remission (HCC)    7. Type 2 diabetes mellitus without complication, without long-term current use of insulin ([facility])  Assessment & Plan:  A1c of 6.3 is very well controlled as of her 3/22 office visit.  Patient is stable to continue with very low-dose Metformin only, but her GFR is only 38, so is also certainly appropriate to see where she goes off of the medication at this time.  We will repeat this in about 6 weeks with a fructosamine level.    Orders:  -     Hemoglobin A1c; Future  -     Fructosamine; Future    8. Stage 3b chronic kidney disease (HCC)  Assessment & Plan:  Patient's GFR is improved here over the past couple months it is up to 38, and her electrolytes are reasonable.  She was on some diuretics previously, these have since been discontinued.  She does still up to be a little bit on the  dry side, fluid intake has been encouraged.    Orders:  -     Renal Function Panel; Future    9. Subclinical hypothyroidism  Assessment & Plan:  TSH is 5.3 as of 3/22.  Patient no specific symptoms this regards, she does appear a bit overstimulated actually, we will follow this up in 6 weeks as well.    Orders:  -     TSH+Free T4; Future    10. Iron deficiency anemia secondary to inadequate dietary iron intake  Overview:  EGD 2021 with no significant findings reported.  COLON 2019 was negative = no more.    Assessment & Plan:  Hemoglobin is reasonable at 1.6 as of her 3/22 office visit.  Additionally her iron level is normal albeit low normal.  Would not recommend any IV iron at this time, will try adding vitamin C to help with the iron pill absorption, twice daily still.      11. Vitamin D deficiency  Assessment & Plan:  Vitamin D is 49 as of her 3/22 office visit.  Patient stable to continue low-dose over-the-counter supplementation.             Follow Up   Return in about 6 weeks (around 4/18/2022).  Patient was given instructions and counseling regarding her condition or for health maintenance advice. Please see specific information pulled into the AVS if appropriate.

## 2022-03-07 ENCOUNTER — OFFICE VISIT (OUTPATIENT)
Dept: INTERNAL MEDICINE | Facility: CLINIC | Age: 77
End: 2022-03-07

## 2022-03-07 VITALS
BODY MASS INDEX: 34.67 KG/M2 | HEART RATE: 56 BPM | DIASTOLIC BLOOD PRESSURE: 67 MMHG | HEIGHT: 55 IN | OXYGEN SATURATION: 96 % | TEMPERATURE: 97.8 F | WEIGHT: 149.8 LBS | SYSTOLIC BLOOD PRESSURE: 130 MMHG

## 2022-03-07 DIAGNOSIS — E03.8 SUBCLINICAL HYPOTHYROIDISM: ICD-10-CM

## 2022-03-07 DIAGNOSIS — I48.11 LONGSTANDING PERSISTENT ATRIAL FIBRILLATION: Primary | ICD-10-CM

## 2022-03-07 DIAGNOSIS — D50.8 IRON DEFICIENCY ANEMIA SECONDARY TO INADEQUATE DIETARY IRON INTAKE: ICD-10-CM

## 2022-03-07 DIAGNOSIS — E11.9 TYPE 2 DIABETES MELLITUS WITHOUT COMPLICATION, WITHOUT LONG-TERM CURRENT USE OF INSULIN: ICD-10-CM

## 2022-03-07 DIAGNOSIS — E55.9 VITAMIN D DEFICIENCY: ICD-10-CM

## 2022-03-07 DIAGNOSIS — F03.90 MAJOR NEUROCOGNITIVE DISORDER: ICD-10-CM

## 2022-03-07 DIAGNOSIS — I10 PRIMARY HYPERTENSION: ICD-10-CM

## 2022-03-07 DIAGNOSIS — N18.32 STAGE 3B CHRONIC KIDNEY DISEASE: ICD-10-CM

## 2022-03-07 DIAGNOSIS — Z99.89 OSA ON CPAP: ICD-10-CM

## 2022-03-07 DIAGNOSIS — G47.33 OSA ON CPAP: ICD-10-CM

## 2022-03-07 DIAGNOSIS — E78.2 MIXED HYPERLIPIDEMIA: ICD-10-CM

## 2022-03-07 DIAGNOSIS — F33.41 RECURRENT MAJOR DEPRESSION IN PARTIAL REMISSION: ICD-10-CM

## 2022-03-07 PROCEDURE — 99214 OFFICE O/P EST MOD 30 MIN: CPT | Performed by: INTERNAL MEDICINE

## 2022-03-07 NOTE — ASSESSMENT & PLAN NOTE
A1c of 6.3 is very well controlled as of her 3/22 office visit.  Patient is stable to continue with very low-dose Metformin only, but her GFR is only 38, so is also certainly appropriate to see where she goes off of the medication at this time.  We will repeat this in about 6 weeks with a fructosamine level.

## 2022-03-07 NOTE — ASSESSMENT & PLAN NOTE
Patient's GFR is improved here over the past couple months it is up to 38, and her electrolytes are reasonable.  She was on some diuretics previously, these have since been discontinued.  She does still up to be a little bit on the dry side, fluid intake has been encouraged.

## 2022-03-07 NOTE — ASSESSMENT & PLAN NOTE
This remains well controlled as of her 3/22 office visit.  Patient remains in sinus rhythm.  She is to continue Xarelto for anticoagulation as well as Cardizem for rate control.

## 2022-03-07 NOTE — ASSESSMENT & PLAN NOTE
We are just now getting this overnight O2 sat set up, hopefully will get the results here in the next week or so.  Asked to patient and family to call for the results please.

## 2022-03-07 NOTE — ASSESSMENT & PLAN NOTE
LDL is 109 as of 3/22 office visit.  This is not exactly at goal for a diabetic, but I do want to make too many changes at one time.  Patient to continue with 20 mg of Crestor for now, will repeat these levels in the summer.

## 2022-03-07 NOTE — ASSESSMENT & PLAN NOTE
Vitamin D is 49 as of her 3/22 office visit.  Patient stable to continue low-dose over-the-counter supplementation.

## 2022-03-07 NOTE — ASSESSMENT & PLAN NOTE
TSH is 5.3 as of 3/22.  Patient no specific symptoms this regards, she does appear a bit overstimulated actually, we will follow this up in 6 weeks as well.

## 2022-03-07 NOTE — ASSESSMENT & PLAN NOTE
Blood pressure remains well controlled as of her 3/22 office visit.  She is stable on low-dose Cardizem only, and as noted previously, needs an ACEI/ARB if BP trends up given diabetes.

## 2022-03-07 NOTE — ASSESSMENT & PLAN NOTE
Hemoglobin is reasonable at 1.6 as of her 3/22 office visit.  Additionally her iron level is normal albeit low normal.  Would not recommend any IV iron at this time, will try adding vitamin C to help with the iron pill absorption, twice daily still.

## 2022-03-10 RX ORDER — OLANZAPINE 5 MG/1
TABLET ORAL
Qty: 30 TABLET | Refills: 5 | Status: SHIPPED | OUTPATIENT
Start: 2022-03-10 | End: 2022-04-18 | Stop reason: SDUPTHER

## 2022-03-10 RX ORDER — LORATADINE 10 MG/1
TABLET ORAL
Qty: 30 TABLET | Refills: 5 | Status: SHIPPED | OUTPATIENT
Start: 2022-03-10 | End: 2022-04-18 | Stop reason: SDUPTHER

## 2022-03-10 RX ORDER — MEMANTINE HYDROCHLORIDE AND DONEPEZIL HYDROCHLORIDE 14; 10 MG/1; MG/1
CAPSULE ORAL
Qty: 30 CAPSULE | Refills: 5 | Status: SHIPPED | OUTPATIENT
Start: 2022-03-10 | End: 2022-09-27

## 2022-03-11 ENCOUNTER — TELEPHONE (OUTPATIENT)
Dept: INTERNAL MEDICINE | Facility: CLINIC | Age: 77
End: 2022-03-11

## 2022-03-11 NOTE — TELEPHONE ENCOUNTER
Caller: Hilary Xie    Relationship: Self    Best call back number: [phone]    What medication are you requesting: SLEEP MEDICATION    What are your current symptoms: PATIENT HAS ISSUES FALLING ASLEEP    How long have you been experiencing symptoms: 3-4 WEEKS NOW    Have you had these symptoms before:    [] Yes  [x] No    Have you been treated for these symptoms before:   [] Yes  [x] No    If a prescription is needed, what is your preferred pharmacy and phone number:      [facility] [place] [place] [facility] [address] - [phone] [facility] [phone]   [phone]

## 2022-03-31 RX ORDER — POTASSIUM CHLORIDE 20 MEQ/1
TABLET, EXTENDED RELEASE ORAL
Qty: 90 TABLET | Refills: 1 | Status: SHIPPED | OUTPATIENT
Start: 2022-03-31 | End: 2022-04-18

## 2022-04-01 NOTE — TELEPHONE ENCOUNTER
Caller: tao jordan    Relationship: Emergency Contact    Best call back number: [phone]     Requested Prescriptions:   Requested Prescriptions     Pending Prescriptions Disp Refills   • dilTIAZem CD (CARDIZEM CD) 180 MG 24 hr capsule       Sig: Take 1 capsule by mouth Daily.        [facility] [address] - [phone] [facility] [phone] FX     Additional details provided by patient: PATIENT IS CURRENTLY OUT OF THIS MEDICATION. PATIENT IS WANTING ALL HER MEDICATIONS TO BE 90 DAY SUPPLY. PLEASE CALL AND ADVISE.     Does the patient have less than a 3 day supply:  [x] Yes  [] No    Nichelle Mendoza Rep   04/01/22 13:02 EDT

## 2022-04-04 RX ORDER — DILTIAZEM HYDROCHLORIDE 180 MG/1
180 CAPSULE, COATED, EXTENDED RELEASE ORAL DAILY
Qty: 90 CAPSULE | Refills: 1 | Status: SHIPPED | OUTPATIENT
Start: 2022-04-04 | End: 2022-09-27

## 2022-04-07 ENCOUNTER — LAB (OUTPATIENT)
Dept: LAB | Facility: HOSPITAL | Age: 77
End: 2022-04-07

## 2022-04-07 DIAGNOSIS — N18.32 STAGE 3B CHRONIC KIDNEY DISEASE: ICD-10-CM

## 2022-04-07 DIAGNOSIS — E03.8 SUBCLINICAL HYPOTHYROIDISM: ICD-10-CM

## 2022-04-07 DIAGNOSIS — E11.9 TYPE 2 DIABETES MELLITUS WITHOUT COMPLICATION, WITHOUT LONG-TERM CURRENT USE OF INSULIN: ICD-10-CM

## 2022-04-07 PROCEDURE — 83036 HEMOGLOBIN GLYCOSYLATED A1C: CPT

## 2022-04-07 PROCEDURE — 36415 COLL VENOUS BLD VENIPUNCTURE: CPT

## 2022-04-07 PROCEDURE — 80069 RENAL FUNCTION PANEL: CPT

## 2022-04-07 PROCEDURE — 82985 ASSAY OF GLYCATED PROTEIN: CPT

## 2022-04-07 PROCEDURE — 84443 ASSAY THYROID STIM HORMONE: CPT

## 2022-04-07 PROCEDURE — 84439 ASSAY OF FREE THYROXINE: CPT

## 2022-04-08 LAB
ALBUMIN SERPL-MCNC: 3.2 G/DL (ref 3.5–5.2)
ANION GAP SERPL CALCULATED.3IONS-SCNC: 15.8 MMOL/L (ref 5–15)
BUN SERPL-MCNC: 14 MG/DL (ref 8–23)
BUN/CREAT SERPL: 11.5 (ref 7–25)
CALCIUM SPEC-SCNC: 8.9 MG/DL (ref 8.6–10.5)
CHLORIDE SERPL-SCNC: 108 MMOL/L (ref 98–107)
CO2 SERPL-SCNC: 16.2 MMOL/L (ref 22–29)
CREAT SERPL-MCNC: 1.22 MG/DL (ref 0.57–1)
EGFRCR SERPLBLD CKD-EPI 2021: 45.8 ML/MIN/1.73
FRUCTOSAMINE SERPL-SCNC: 277 UMOL/L (ref 0–285)
GLUCOSE SERPL-MCNC: 208 MG/DL (ref 65–99)
HBA1C MFR BLD: 6.5 % (ref 4.8–5.6)
PHOSPHATE SERPL-MCNC: 2.4 MG/DL (ref 2.5–4.5)
POTASSIUM SERPL-SCNC: 3.1 MMOL/L (ref 3.5–5.2)
SODIUM SERPL-SCNC: 140 MMOL/L (ref 136–145)
T4 FREE SERPL-MCNC: 0.98 NG/DL (ref 0.93–1.7)
TSH SERPL DL<=0.05 MIU/L-ACNC: 2.31 UIU/ML (ref 0.27–4.2)

## 2022-04-18 ENCOUNTER — OFFICE VISIT (OUTPATIENT)
Dept: INTERNAL MEDICINE | Facility: CLINIC | Age: 77
End: 2022-04-18

## 2022-04-18 VITALS
DIASTOLIC BLOOD PRESSURE: 66 MMHG | TEMPERATURE: 98 F | SYSTOLIC BLOOD PRESSURE: 107 MMHG | HEART RATE: 59 BPM | OXYGEN SATURATION: 96 % | HEIGHT: 55 IN | WEIGHT: 158.8 LBS | BODY MASS INDEX: 36.75 KG/M2

## 2022-04-18 DIAGNOSIS — E11.9 TYPE 2 DIABETES MELLITUS WITHOUT COMPLICATION, WITHOUT LONG-TERM CURRENT USE OF INSULIN: ICD-10-CM

## 2022-04-18 DIAGNOSIS — E78.2 MIXED HYPERLIPIDEMIA: ICD-10-CM

## 2022-04-18 DIAGNOSIS — D50.8 IRON DEFICIENCY ANEMIA SECONDARY TO INADEQUATE DIETARY IRON INTAKE: ICD-10-CM

## 2022-04-18 DIAGNOSIS — I48.11 LONGSTANDING PERSISTENT ATRIAL FIBRILLATION: Primary | ICD-10-CM

## 2022-04-18 DIAGNOSIS — G47.33 OSA ON CPAP: ICD-10-CM

## 2022-04-18 DIAGNOSIS — I10 PRIMARY HYPERTENSION: ICD-10-CM

## 2022-04-18 DIAGNOSIS — N18.32 STAGE 3B CHRONIC KIDNEY DISEASE: ICD-10-CM

## 2022-04-18 DIAGNOSIS — E03.8 SUBCLINICAL HYPOTHYROIDISM: ICD-10-CM

## 2022-04-18 DIAGNOSIS — Z99.89 OSA ON CPAP: ICD-10-CM

## 2022-04-18 DIAGNOSIS — F03.90 MAJOR NEUROCOGNITIVE DISORDER: ICD-10-CM

## 2022-04-18 PROCEDURE — 99214 OFFICE O/P EST MOD 30 MIN: CPT | Performed by: INTERNAL MEDICINE

## 2022-04-18 RX ORDER — RISEDRONATE SODIUM 150 MG/1
150 TABLET, FILM COATED ORAL
Qty: 3 TABLET | Refills: 1 | Status: SHIPPED | OUTPATIENT
Start: 2022-04-18 | End: 2022-09-27

## 2022-04-18 RX ORDER — OLANZAPINE 5 MG/1
5 TABLET ORAL DAILY
Qty: 90 TABLET | Refills: 1 | Status: SHIPPED | OUTPATIENT
Start: 2022-04-18

## 2022-04-18 RX ORDER — LORATADINE 10 MG/1
10 TABLET ORAL DAILY
Qty: 90 TABLET | Refills: 1 | Status: SHIPPED | OUTPATIENT
Start: 2022-04-18 | End: 2022-09-27

## 2022-04-18 RX ORDER — ROSUVASTATIN CALCIUM 20 MG/1
20 TABLET, COATED ORAL DAILY
Qty: 90 TABLET | Refills: 1 | Status: SHIPPED | OUTPATIENT
Start: 2022-04-18 | End: 2023-02-13

## 2022-04-18 RX ORDER — ACETAMINOPHEN 160 MG
2000 TABLET,DISINTEGRATING ORAL DAILY
Qty: 90 CAPSULE | Refills: 1 | Status: SHIPPED | OUTPATIENT
Start: 2022-04-18 | End: 2023-02-23

## 2022-04-18 RX ORDER — FERROUS SULFATE 325(65) MG
1 TABLET ORAL 2 TIMES DAILY
Qty: 180 TABLET | Refills: 1 | Status: SHIPPED | OUTPATIENT
Start: 2022-04-18

## 2022-04-18 NOTE — ASSESSMENT & PLAN NOTE
Patient's GFR is better as of her 4/22 office visit, it was up to 46, she looked better hydrated.  Of note, her potassium was only 3.1 and she was a bit acidotic, lab need to be repeated in the near future.

## 2022-04-18 NOTE — PROGRESS NOTES
"Chief Complaint  Hyperlipidemia (Pt is here for routine follow up. Pt states that she is still sleeping very well.)    Subjective      Hilary Xie presents to [facility]    History of Present Illness  Patient pleasant 76-year-old female with underlying PAF, DM, osteoporosis on treatment, among others, who was seen 2/22 as a new pt and who is coming in 4/22 for further follow-up.  We will review all her routine medications, go over her labs in detail, and address any new concerns as time permits.    Review of Systems   Constitutional: Negative for appetite change, fatigue and fever.   HENT: Negative for congestion and ear pain.    Eyes: Negative for blurred vision.   Respiratory: Negative for cough, chest tightness, shortness of breath and wheezing.    Cardiovascular: Negative for chest pain, palpitations and leg swelling.   Gastrointestinal: Negative for abdominal pain.   Genitourinary: Negative for difficulty urinating, dysuria and hematuria.   Musculoskeletal: Negative for arthralgias and gait problem.   Skin: Negative for skin lesions.   Neurological: Negative for syncope, memory problem and confusion.   Psychiatric/Behavioral: Negative for self-injury and depressed mood.       Objective   Vital Signs:   /66   Pulse 59   Temp 98 °F (36.7 °C)   Ht 62 cm (24.41\")   Wt 72 kg (158 lb 12.8 oz)   SpO2 96%   .39 kg/m²           Physical Exam  Vitals and nursing note reviewed.   Constitutional:       General: She is not in acute distress.     Appearance: Normal appearance. She is not toxic-appearing.   HENT:      Head: Atraumatic.      Right Ear: External ear normal.      Left Ear: External ear normal.      Nose: Nose normal.      Mouth/Throat:      Mouth: Mucous membranes are moist.   Eyes:      General:         Right eye: No discharge.         Left eye: No discharge.      Extraocular Movements: Extraocular movements intact.      Pupils: Pupils are equal, round, and " reactive to light.   Cardiovascular:      Rate and Rhythm: Normal rate and regular rhythm.      Pulses: Normal pulses.      Heart sounds: Normal heart sounds. No murmur heard.    No gallop.   Pulmonary:      Effort: Pulmonary effort is normal. No respiratory distress.      Breath sounds: No wheezing, rhonchi or rales.   Abdominal:      General: There is no distension.      Palpations: Abdomen is soft. There is no mass.      Tenderness: There is no abdominal tenderness. There is no guarding.   Musculoskeletal:         General: No swelling or tenderness.      Cervical back: No tenderness.      Right lower leg: No edema.      Left lower leg: No edema.   Skin:     General: Skin is warm and dry.      Findings: No rash.   Neurological:      General: No focal deficit present.      Mental Status: She is alert and oriented to person, place, and time. Mental status is at baseline.      Motor: No weakness.      Gait: Gait normal.   Psychiatric:         Mood and Affect: Mood normal.         Thought Content: Thought content normal.          Result Review   The following data was reviewed by: Clarence Martínez MD on 02/14/2022:  [x] Laboratory  [] Microbiology  [] Radiology  [] EKG/telemetry  [] Cardiology/Vascular  [] Pathology  [x] Old records             Assessment and Plan   Diagnoses and all orders for this visit:    1. Longstanding persistent atrial fibrillation (HCC) (Primary)  Overview:  Since 1980's.  Cardioverted x1.  More c/w PAF.  Followed by Dr. Chamberlain.      Assessment & Plan:  Patient appears in normal sinus rhythm as of 4/22 office visit.  She is maintained on moderate dose Cardizem, rate is in the 60s.  Additionally she is on Xarelto for anticoagulation, continue same, along with follow-up cardiology in 3 months.      2. Primary hypertension  Assessment & Plan:  This remains a nonissue as of 4/22 office visit.  She is on the Cardizem for the A. fib.  We would use ACEI/ARB given diabetes if blood pressure trends  up.    Orders:  -     Magnesium; Future    3. Type 2 diabetes mellitus without complication, without long-term current use of insulin (HCC)  Assessment & Plan:  A1c is up only from 6.3 to 6.5 as of her 4/22 office visit.  This is off of low-dose metformin, so she certainly does not require that medication at this time, we will continue to monitor her A1c is for a while.     Orders:  -     Hemoglobin A1c; Future    4. Major neurocognitive disorder (HCC)  Overview:  Dewayne Kong MD  = note from 10/21 was reviewed.      5. Stage 3b chronic kidney disease (HCC)  Assessment & Plan:  Patient's GFR is better as of her 4/22 office visit, it was up to 46, she looked better hydrated.  Of note, her potassium was only 3.1 and she was a bit acidotic, lab need to be repeated in the near future.    Orders:  -     Comprehensive Metabolic Panel; Future  -     Renal Function Panel; Future  -     Magnesium; Future    6. Subclinical hypothyroidism  Assessment & Plan:  TSH has recovered on its own from 5.4 down to 2.3 as of her 4/22 office visit.  Patient is not on nor does she need any thyroid supplementation at this time.      7. Mixed hyperlipidemia  -     Lipid Panel; Future    8. MARY on CPAP  Assessment & Plan:  Her O2 sat was reviewed at her 4/22 office visit, and her average sat while sleeping was 95%, she drifted below 88% for only a minute the whole night.  Discussed with them that there is no evidence that she is having any hypoxia at night, so wearing oxygen would not be helpful and/or indicated.      9. Iron deficiency anemia secondary to inadequate dietary iron intake  Overview:  EGD 2021 with no significant findings reported.  COLON 2019 was negative = no more.    Orders:  -     CBC & Differential; Future  -     Iron Profile; Future  -     Ferritin; Future    Other orders  -     ferrous sulfate (FeroSul) 325 (65 FE) MG tablet; Take 1 tablet by mouth 2 (Two) Times a Day.  Dispense: 180 tablet; Refill: 1  -      loratadine (CLARITIN) 10 MG tablet; Take 1 tablet by mouth Daily.  Dispense: 90 tablet; Refill: 1  -     rosuvastatin (CRESTOR) 20 MG tablet; Take 1 tablet by mouth Daily.  Dispense: 90 tablet; Refill: 1  -     risedronate (ACTONEL) 150 MG tablet; Take 1 tablet by mouth Every 30 (Thirty) Days.  Dispense: 3 tablet; Refill: 1  -     OLANZapine (zyPREXA) 5 MG tablet; Take 1 tablet by mouth Daily.  Dispense: 90 tablet; Refill: 1  -     Cholecalciferol (Vitamin D3) 50 MCG (2000 UT) capsule; Take 1 capsule by mouth Daily.  Dispense: 90 capsule; Refill: 1           Follow Up   Return in about 4 months (around 8/18/2022).  Patient was given instructions and counseling regarding her condition or for health maintenance advice. Please see specific information pulled into the AVS if appropriate.

## 2022-04-18 NOTE — ASSESSMENT & PLAN NOTE
Her O2 sat was reviewed at her 4/22 office visit, and her average sat while sleeping was 95%, she drifted below 88% for only a minute the whole night.  Discussed with them that there is no evidence that she is having any hypoxia at night, so wearing oxygen would not be helpful and/or indicated.

## 2022-04-18 NOTE — ASSESSMENT & PLAN NOTE
TSH has recovered on its own from 5.4 down to 2.3 as of her 4/22 office visit.  Patient is not on nor does she need any thyroid supplementation at this time.

## 2022-04-18 NOTE — ASSESSMENT & PLAN NOTE
This remains a nonissue as of 4/22 office visit.  She is on the Cardizem for the A. fib.  We would use ACEI/ARB given diabetes if blood pressure trends up.

## 2022-04-18 NOTE — ASSESSMENT & PLAN NOTE
Patient appears in normal sinus rhythm as of 4/22 office visit.  She is maintained on moderate dose Cardizem, rate is in the 60s.  Additionally she is on Xarelto for anticoagulation, continue same, along with follow-up cardiology in 3 months.

## 2022-04-18 NOTE — ASSESSMENT & PLAN NOTE
A1c is up only from 6.3 to 6.5 as of her 4/22 office visit.  This is off of low-dose metformin, so she certainly does not require that medication at this time, we will continue to monitor her A1c is for a while.

## 2022-04-18 NOTE — PATIENT INSTRUCTIONS
1.  Renal panel and magnesium nonfasting in the next week or so and call for results.    2.  Comprehensive labs fasting in 4 months with appointment.

## 2022-04-29 ENCOUNTER — LAB (OUTPATIENT)
Dept: LAB | Facility: HOSPITAL | Age: 77
End: 2022-04-29

## 2022-04-29 DIAGNOSIS — E78.2 MIXED HYPERLIPIDEMIA: ICD-10-CM

## 2022-04-29 LAB
CHOLEST SERPL-MCNC: 168 MG/DL (ref 0–200)
HDLC SERPL-MCNC: 69 MG/DL (ref 40–60)
LDLC SERPL CALC-MCNC: 73 MG/DL (ref 0–100)
LDLC/HDLC SERPL: 0.99 {RATIO}
TRIGL SERPL-MCNC: 153 MG/DL (ref 0–150)
VLDLC SERPL-MCNC: 26 MG/DL (ref 5–40)

## 2022-04-29 PROCEDURE — 36415 COLL VENOUS BLD VENIPUNCTURE: CPT

## 2022-04-29 PROCEDURE — 80061 LIPID PANEL: CPT

## 2022-05-31 ENCOUNTER — TELEPHONE (OUTPATIENT)
Dept: INTERNAL MEDICINE | Facility: CLINIC | Age: 77
End: 2022-05-31

## 2022-05-31 NOTE — TELEPHONE ENCOUNTER
Caller: nikkitao    Relationship: Emergency Contact    Best call back number: [phone]    Requested Prescriptions:   Requested Prescriptions     Pending Prescriptions Disp Refills   • rivaroxaban (XARELTO) 20 MG tablet 30 tablet      Si tablet Daily With Dinner.        Pharmacy where request should be sent: [facility] - [place], [place] - [address] - [phone] [facility] [phone] FX     Additional details provided by patient: PATIENT WOULD LIKE A 90 DAY SUPPLY. PATIENT HAS ASKED FOR THIS SEVERAL TIMES.    Does the patient have less than a 3 day supply:  [] Yes  [x] No    Nichelle Hook Rep   22 14:28 EDT

## 2022-06-21 ENCOUNTER — TELEPHONE (OUTPATIENT)
Dept: INTERNAL MEDICINE | Facility: CLINIC | Age: 77
End: 2022-06-21

## 2022-06-21 DIAGNOSIS — F33.41 RECURRENT MAJOR DEPRESSION IN PARTIAL REMISSION: Primary | ICD-10-CM

## 2022-06-21 RX ORDER — SERTRALINE HYDROCHLORIDE 100 MG/1
150 TABLET, FILM COATED ORAL DAILY
Qty: 135 TABLET | Refills: 1 | Status: SHIPPED | OUTPATIENT
Start: 2022-06-21 | End: 2023-02-03

## 2022-06-21 NOTE — TELEPHONE ENCOUNTER
Caller: tao jrodan    Relationship: Emergency Contact    Best call back number:   [phone]  [phone] IS THE PATIENTS NUMBER   Requested Prescriptions:   sertraline      Pharmacy where request should be sent: [facility] - [place] - [address] - [phone]  - [phone] FX     Additional details provided by patient: TAO STATED THAT THE PATIENT TAKES 150 MG OF THIS MEDICATION, 1 AND A HALF PILLS DAILY AND HAS BEEN OUT SINCE 6/18/22    Does the patient have less than a 3 day supply:  [x] Yes  [] No    Nichelle PHILLIPS   06/21/22 16:26 EDT

## 2022-08-18 ENCOUNTER — LAB (OUTPATIENT)
Dept: LAB | Facility: HOSPITAL | Age: 77
End: 2022-08-18

## 2022-08-18 ENCOUNTER — OFFICE VISIT (OUTPATIENT)
Dept: INTERNAL MEDICINE | Facility: CLINIC | Age: 77
End: 2022-08-18

## 2022-08-18 VITALS
HEIGHT: 55 IN | BODY MASS INDEX: 37.58 KG/M2 | TEMPERATURE: 97 F | OXYGEN SATURATION: 96 % | SYSTOLIC BLOOD PRESSURE: 146 MMHG | WEIGHT: 162.4 LBS | HEART RATE: 66 BPM | DIASTOLIC BLOOD PRESSURE: 71 MMHG

## 2022-08-18 DIAGNOSIS — E55.9 VITAMIN D DEFICIENCY: ICD-10-CM

## 2022-08-18 DIAGNOSIS — Z00.00 WELL ADULT EXAM: ICD-10-CM

## 2022-08-18 DIAGNOSIS — N18.32 STAGE 3B CHRONIC KIDNEY DISEASE: ICD-10-CM

## 2022-08-18 DIAGNOSIS — D50.8 IRON DEFICIENCY ANEMIA SECONDARY TO INADEQUATE DIETARY IRON INTAKE: ICD-10-CM

## 2022-08-18 DIAGNOSIS — E03.8 SUBCLINICAL HYPOTHYROIDISM: ICD-10-CM

## 2022-08-18 DIAGNOSIS — F03.90 MAJOR NEUROCOGNITIVE DISORDER: ICD-10-CM

## 2022-08-18 DIAGNOSIS — E11.9 TYPE 2 DIABETES MELLITUS WITHOUT COMPLICATION, WITHOUT LONG-TERM CURRENT USE OF INSULIN: ICD-10-CM

## 2022-08-18 DIAGNOSIS — E78.2 MIXED HYPERLIPIDEMIA: Primary | ICD-10-CM

## 2022-08-18 DIAGNOSIS — I48.11 LONGSTANDING PERSISTENT ATRIAL FIBRILLATION: ICD-10-CM

## 2022-08-18 DIAGNOSIS — I10 PRIMARY HYPERTENSION: ICD-10-CM

## 2022-08-18 DIAGNOSIS — F33.41 RECURRENT MAJOR DEPRESSION IN PARTIAL REMISSION: ICD-10-CM

## 2022-08-18 LAB — PHOSPHATE SERPL-MCNC: 3.2 MG/DL (ref 2.5–4.5)

## 2022-08-18 PROCEDURE — 82728 ASSAY OF FERRITIN: CPT

## 2022-08-18 PROCEDURE — 99214 OFFICE O/P EST MOD 30 MIN: CPT | Performed by: INTERNAL MEDICINE

## 2022-08-18 PROCEDURE — 36415 COLL VENOUS BLD VENIPUNCTURE: CPT

## 2022-08-18 PROCEDURE — 82306 VITAMIN D 25 HYDROXY: CPT

## 2022-08-18 PROCEDURE — 85025 COMPLETE CBC W/AUTO DIFF WBC: CPT

## 2022-08-18 PROCEDURE — 80053 COMPREHEN METABOLIC PANEL: CPT

## 2022-08-18 PROCEDURE — 83540 ASSAY OF IRON: CPT

## 2022-08-18 PROCEDURE — 84100 ASSAY OF PHOSPHORUS: CPT

## 2022-08-18 PROCEDURE — 83735 ASSAY OF MAGNESIUM: CPT

## 2022-08-18 PROCEDURE — 83036 HEMOGLOBIN GLYCOSYLATED A1C: CPT

## 2022-08-18 PROCEDURE — 84466 ASSAY OF TRANSFERRIN: CPT

## 2022-08-18 PROCEDURE — 82607 VITAMIN B-12: CPT

## 2022-08-18 PROCEDURE — 84443 ASSAY THYROID STIM HORMONE: CPT

## 2022-08-18 PROCEDURE — 82746 ASSAY OF FOLIC ACID SERUM: CPT

## 2022-08-18 RX ORDER — TRAZODONE HYDROCHLORIDE 50 MG/1
TABLET ORAL
Qty: 30 TABLET | Refills: 1 | Status: SHIPPED | OUTPATIENT
Start: 2022-08-18 | End: 2022-09-27

## 2022-08-18 RX ORDER — POTASSIUM CHLORIDE 20 MEQ/1
20 TABLET, EXTENDED RELEASE ORAL DAILY
COMMUNITY
Start: 2022-06-22 | End: 2022-08-20 | Stop reason: SDUPTHER

## 2022-08-18 NOTE — ASSESSMENT & PLAN NOTE
Patient's labs were advertently not obtained in 4/22 as requested.  We are looking into why this occurred.  Regardless, need to get renal parameters here very soon.  Asked family to call the following day after the labs are obtained = GFR stable at 44, but K+ is only 3.4.  Will add 10 Meq daily and f/u on RTO.

## 2022-08-18 NOTE — ASSESSMENT & PLAN NOTE
We will repeat this now since its been about 4 months.  Otherwise patient appears clinically euthyroid.

## 2022-08-18 NOTE — ASSESSMENT & PLAN NOTE
Her lipids were inadvertently obtained early in 4/22, but it was much improved, LDL was only 73.  That is at goal, so she stable to continue with moderate dose of Crestor.

## 2022-08-18 NOTE — ASSESSMENT & PLAN NOTE
Patient with significant issues in regards her sleep as of 8/22.  We got her back on the full dose of Zyprexa earlier this year, but it was not beneficial in regards to sleep at least.  She has tried several over-the-counter remedies without any benefit.  We will add low-dose trazodone at this time, family to call in regards to response, we will titrate as needed.

## 2022-08-18 NOTE — ASSESSMENT & PLAN NOTE
Blood pressure well controlled as of her 8/22 office visit.  She is on Cardizem only in regards to her A. fib, certainly does not need other agent at this time.  We will use ACEI/ARB if she does given underlying diabetes.

## 2022-08-18 NOTE — ASSESSMENT & PLAN NOTE
Patient is maintaining NSR as of 8/22.  Her heart rate is only in the 60s.  She has Cardizem available for rate control if needed.  She is maintained on Xarelto for CVA prevention.  Continue same plan of care.

## 2022-08-18 NOTE — PATIENT INSTRUCTIONS
1.  Please get comprehensive labs done today or soon, nonfasting as we said.    2.  Please call Monday for the results.    3.  Please call in about 2 weeks in regards to the trazodone.    4.  Please call about a week before her next appointment to ensure that we have labs ordered.

## 2022-08-18 NOTE — PROGRESS NOTES
"Chief Complaint  Hyperlipidemia, Follow-up (Pt states that this is a routine visit, she didn't have labs done), and Insomnia (Pt is going 5-6 days with no sleep, then her body crashes, they are requesting something for sleep, she has tried Benadryl, Melatonin and Tylenol PM OTC with no help)    Subjective      Hilary Xie presents to [facility]    Hyperlipidemia  Pertinent negatives include no chest pain or shortness of breath.   Insomnia  Pertinent negatives include no abdominal pain, arthralgias, chest pain, congestion, coughing, fatigue or fever.     History of present illness:  Patient pleasant 77-year-old female with underlying PAF, DM, osteoporosis on treatment, among others, who was seen 2/22 as a New Pt and who is coming in 8/22 for routine 4-month follow-up.  We will review all her routine medications, go over her labs in detail, and address any new concerns as time permits.    Review of Systems   Constitutional: Negative for appetite change, fatigue and fever.   HENT: Negative for congestion and ear pain.    Eyes: Negative for blurred vision.   Respiratory: Negative for cough, chest tightness, shortness of breath and wheezing.    Cardiovascular: Negative for chest pain, palpitations and leg swelling.   Gastrointestinal: Negative for abdominal pain.   Genitourinary: Negative for difficulty urinating, dysuria and hematuria.   Musculoskeletal: Negative for arthralgias and gait problem.   Skin: Negative for skin lesions.   Neurological: Negative for syncope, memory problem and confusion.   Psychiatric/Behavioral: Negative for self-injury and depressed mood. The patient has insomnia.        Objective   Vital Signs:   /71   Pulse 66   Temp 97 °F (36.1 °C)   Ht 62 cm (24.41\")   Wt 73.7 kg (162 lb 6.4 oz)   SpO2 96%   .64 kg/m²           Physical Exam  Vitals and nursing note reviewed.   Constitutional:       General: She is not in acute distress.     " Appearance: Normal appearance. She is not toxic-appearing.   HENT:      Head: Atraumatic.      Right Ear: External ear normal.      Left Ear: External ear normal.      Nose: Nose normal.      Mouth/Throat:      Mouth: Mucous membranes are moist.   Eyes:      General:         Right eye: No discharge.         Left eye: No discharge.      Extraocular Movements: Extraocular movements intact.      Pupils: Pupils are equal, round, and reactive to light.   Cardiovascular:      Rate and Rhythm: Normal rate and regular rhythm.      Pulses: Normal pulses.      Heart sounds: Normal heart sounds. No murmur heard.    No gallop.   Pulmonary:      Effort: Pulmonary effort is normal. No respiratory distress.      Breath sounds: No wheezing, rhonchi or rales.   Abdominal:      General: There is no distension.      Palpations: Abdomen is soft. There is no mass.      Tenderness: There is no abdominal tenderness. There is no guarding.   Musculoskeletal:         General: No swelling or tenderness.      Cervical back: No tenderness.      Right lower leg: No edema.      Left lower leg: No edema.   Skin:     General: Skin is warm and dry.      Findings: No rash.   Neurological:      General: No focal deficit present.      Mental Status: She is alert and oriented to person, place, and time. Mental status is at baseline.      Motor: No weakness.      Gait: Gait normal.   Psychiatric:         Mood and Affect: Mood normal.         Thought Content: Thought content normal.          Result Review   The following data was reviewed by: Clarence Martínez MD on 02/14/2022:  [x] Laboratory  [] Microbiology  [] Radiology  [] EKG/telemetry  [] Cardiology/Vascular  [] Pathology  [x] Old records             Assessment and Plan   Diagnoses and all orders for this visit:    1. Mixed hyperlipidemia (Primary)  Assessment & Plan:  Her lipids were inadvertently obtained early in 4/22, but it was much improved, LDL was only 73.  That is at goal, so she stable to  continue with moderate dose of Crestor.      2. Longstanding persistent atrial fibrillation (HCC)  Overview:  Since 1980's.  Cardioverted x1.  More c/w PAF.  Followed by Dr. Chamberlain.      Assessment & Plan:  Patient is maintaining NSR as of 8/22.  Her heart rate is only in the 60s.  She has Cardizem available for rate control if needed.  She is maintained on Xarelto for CVA prevention.  Continue same plan of care.      3. Major neurocognitive disorder (HCC)  Overview:  Dewayne Kong MD  = note from 10/21 was reviewed.    Assessment & Plan:  Patient with significant issues in regards her sleep as of 8/22.  We got her back on the full dose of Zyprexa earlier this year, but it was not beneficial in regards to sleep at least.  She has tried several over-the-counter remedies without any benefit.  We will add low-dose trazodone at this time, family to call in regards to response, we will titrate as needed.      4. Primary hypertension  Assessment & Plan:  Blood pressure well controlled as of her 8/22 office visit.  She is on Cardizem only in regards to her A. fib, certainly does not need other agent at this time.  We will use ACEI/ARB if she does given underlying diabetes.    Orders:  -     Magnesium; Future  -     Comprehensive Metabolic Panel; Future    5. Recurrent major depression in partial remission (HCC)  Assessment & Plan:  As above, adding trazodone as of 8/22.      6. Stage 3b chronic kidney disease (HCC)  Assessment & Plan:  Patient's labs were advertently not obtained in 4/22 as requested.  We are looking into why this occurred.  Regardless, need to get renal parameters here very soon.  Asked family to call the following day after the labs are obtained = GFR stable at 44, but K+ is only 3.4.  Will add 10 Meq daily and f/u on RTO.    Orders:  -     CBC & Differential; Future    7. Subclinical hypothyroidism  Assessment & Plan:  We will repeat this now since its been about 4 months.  Otherwise patient appears  clinically euthyroid.    Orders:  -     TSH; Future    8. Type 2 diabetes mellitus without complication, without long-term current use of insulin ([facility])  Assessment & Plan:  A1c was fine in 4/22, but we need repeat now as of 8/22.  Patient remains off of medication this regard.    Orders:  -     Hemoglobin A1c; Future    9. Well adult exam  -     Vitamin B12 anemia; Future  -     Folate anemia; Future    10. Vitamin D deficiency  -     Vitamin D 25 Hydroxy; Future    Other orders  -     traZODone (DESYREL) 50 MG tablet; Take 1/2 tablet 90 minutes prior to sleep, may increase to whole tablet if needed in about a week.  Dispense: 30 tablet; Refill: 1  -     potassium chloride (K-DUR,KLOR-CON) 10 MEQ CR tablet; Take 2 tablets by mouth Daily.  Dispense: 90 tablet; Refill: 1           Follow Up   Return in about 3 months (around 11/18/2022).  Patient was given instructions and counseling regarding her condition or for health maintenance advice. Please see specific information pulled into the AVS if appropriate.

## 2022-08-18 NOTE — ASSESSMENT & PLAN NOTE
A1c was fine in 4/22, but we need repeat now as of 8/22.  Patient remains off of medication this regard.

## 2022-08-19 LAB
25(OH)D3 SERPL-MCNC: 45.7 NG/ML (ref 30–100)
ALBUMIN SERPL-MCNC: 3.8 G/DL (ref 3.5–5.2)
ALBUMIN/GLOB SERPL: 1.4 G/DL
ALP SERPL-CCNC: 65 U/L (ref 39–117)
ALT SERPL W P-5'-P-CCNC: 8 U/L (ref 1–33)
ANION GAP SERPL CALCULATED.3IONS-SCNC: 12.7 MMOL/L (ref 5–15)
AST SERPL-CCNC: 11 U/L (ref 1–32)
BASOPHILS # BLD AUTO: 0.07 10*3/MM3 (ref 0–0.2)
BASOPHILS NFR BLD AUTO: 0.8 % (ref 0–1.5)
BILIRUB SERPL-MCNC: 0.4 MG/DL (ref 0–1.2)
BUN SERPL-MCNC: 17 MG/DL (ref 8–23)
BUN/CREAT SERPL: 13.6 (ref 7–25)
CALCIUM SPEC-SCNC: 9.5 MG/DL (ref 8.6–10.5)
CHLORIDE SERPL-SCNC: 105 MMOL/L (ref 98–107)
CO2 SERPL-SCNC: 21.3 MMOL/L (ref 22–29)
CREAT SERPL-MCNC: 1.25 MG/DL (ref 0.57–1)
DEPRECATED RDW RBC AUTO: 43.1 FL (ref 37–54)
EGFRCR SERPLBLD CKD-EPI 2021: 44.2 ML/MIN/1.73
EOSINOPHIL # BLD AUTO: 0.23 10*3/MM3 (ref 0–0.4)
EOSINOPHIL NFR BLD AUTO: 2.6 % (ref 0.3–6.2)
ERYTHROCYTE [DISTWIDTH] IN BLOOD BY AUTOMATED COUNT: 13.6 % (ref 12.3–15.4)
FERRITIN SERPL-MCNC: 119 NG/ML (ref 13–150)
FOLATE SERPL-MCNC: 5.61 NG/ML (ref 4.78–24.2)
GLOBULIN UR ELPH-MCNC: 2.8 GM/DL
GLUCOSE SERPL-MCNC: 205 MG/DL (ref 65–99)
HBA1C MFR BLD: 6.8 % (ref 4.8–5.6)
HCT VFR BLD AUTO: 41.1 % (ref 34–46.6)
HGB BLD-MCNC: 13.5 G/DL (ref 12–15.9)
IMM GRANULOCYTES # BLD AUTO: 0.04 10*3/MM3 (ref 0–0.05)
IMM GRANULOCYTES NFR BLD AUTO: 0.5 % (ref 0–0.5)
IRON 24H UR-MRATE: 75 MCG/DL (ref 37–145)
IRON SATN MFR SERPL: 22 % (ref 20–50)
LYMPHOCYTES # BLD AUTO: 1.87 10*3/MM3 (ref 0.7–3.1)
LYMPHOCYTES NFR BLD AUTO: 21.1 % (ref 19.6–45.3)
MAGNESIUM SERPL-MCNC: 2 MG/DL (ref 1.6–2.4)
MCH RBC QN AUTO: 28.9 PG (ref 26.6–33)
MCHC RBC AUTO-ENTMCNC: 32.8 G/DL (ref 31.5–35.7)
MCV RBC AUTO: 88 FL (ref 79–97)
MONOCYTES # BLD AUTO: 0.47 10*3/MM3 (ref 0.1–0.9)
MONOCYTES NFR BLD AUTO: 5.3 % (ref 5–12)
NEUTROPHILS NFR BLD AUTO: 6.19 10*3/MM3 (ref 1.7–7)
NEUTROPHILS NFR BLD AUTO: 69.7 % (ref 42.7–76)
NRBC BLD AUTO-RTO: 0 /100 WBC (ref 0–0.2)
PLATELET # BLD AUTO: 280 10*3/MM3 (ref 140–450)
PMV BLD AUTO: 11.8 FL (ref 6–12)
POTASSIUM SERPL-SCNC: 3.4 MMOL/L (ref 3.5–5.2)
PROT SERPL-MCNC: 6.6 G/DL (ref 6–8.5)
RBC # BLD AUTO: 4.67 10*6/MM3 (ref 3.77–5.28)
SODIUM SERPL-SCNC: 139 MMOL/L (ref 136–145)
TIBC SERPL-MCNC: 334 MCG/DL (ref 298–536)
TRANSFERRIN SERPL-MCNC: 224 MG/DL (ref 200–360)
TSH SERPL DL<=0.05 MIU/L-ACNC: 3.48 UIU/ML (ref 0.27–4.2)
VIT B12 BLD-MCNC: 375 PG/ML (ref 211–946)
WBC NRBC COR # BLD: 8.87 10*3/MM3 (ref 3.4–10.8)

## 2022-08-20 RX ORDER — POTASSIUM CHLORIDE 750 MG/1
20 TABLET, EXTENDED RELEASE ORAL DAILY
Qty: 90 TABLET | Refills: 1 | Status: SHIPPED | OUTPATIENT
Start: 2022-08-20 | End: 2023-02-15 | Stop reason: SDUPTHER

## 2022-08-22 ENCOUNTER — TELEPHONE (OUTPATIENT)
Dept: INTERNAL MEDICINE | Facility: CLINIC | Age: 77
End: 2022-08-22

## 2022-08-22 DIAGNOSIS — E78.2 MIXED HYPERLIPIDEMIA: ICD-10-CM

## 2022-08-22 DIAGNOSIS — I10 PRIMARY HYPERTENSION: Primary | ICD-10-CM

## 2022-08-22 DIAGNOSIS — E03.8 SUBCLINICAL HYPOTHYROIDISM: Primary | ICD-10-CM

## 2022-08-22 DIAGNOSIS — E11.9 TYPE 2 DIABETES MELLITUS WITHOUT COMPLICATION, WITHOUT LONG-TERM CURRENT USE OF INSULIN: ICD-10-CM

## 2022-08-22 NOTE — TELEPHONE ENCOUNTER
Dr. Martínez,   Pt doesn't have any labs ordered for November I have put in BMP and A1C  That you wanted. Wanted to make sure you didn't want any other labs. Thanks.

## 2022-08-22 NOTE — TELEPHONE ENCOUNTER
----- Message from Clarence Martínez MD sent at 8/20/2022 11:11 AM EDT -----  Please let daughter know that pt's potassium is better, but she would benefit from a low daily dose = script sent to her pharmacy, ? [facility].      Rest of labs, alba A1C were good.  Keep appt and please ensure I ordered f/u labs = at least BMP/A1C.    Thanks.

## 2022-08-30 ENCOUNTER — OFFICE VISIT (OUTPATIENT)
Dept: CARDIOLOGY | Facility: CLINIC | Age: 77
End: 2022-08-30

## 2022-08-30 ENCOUNTER — TELEPHONE (OUTPATIENT)
Dept: INTERNAL MEDICINE | Facility: CLINIC | Age: 77
End: 2022-08-30

## 2022-08-30 VITALS
SYSTOLIC BLOOD PRESSURE: 127 MMHG | WEIGHT: 162 LBS | DIASTOLIC BLOOD PRESSURE: 66 MMHG | HEART RATE: 64 BPM | HEIGHT: 62 IN | BODY MASS INDEX: 29.81 KG/M2

## 2022-08-30 DIAGNOSIS — I10 PRIMARY HYPERTENSION: ICD-10-CM

## 2022-08-30 DIAGNOSIS — E78.2 MIXED HYPERLIPIDEMIA: ICD-10-CM

## 2022-08-30 DIAGNOSIS — I48.11 LONGSTANDING PERSISTENT ATRIAL FIBRILLATION: Primary | ICD-10-CM

## 2022-08-30 PROCEDURE — 99213 OFFICE O/P EST LOW 20 MIN: CPT | Performed by: INTERNAL MEDICINE

## 2022-08-30 NOTE — TELEPHONE ENCOUNTER
Provider: YANCI WALLER     Caller:LESLEY WITT     Relationship to Patient: DAUGHTER IN LAW     Phone Number: [phone]     Reason for Call: PATIENT DAUGHTER IN LAW CALLING TO STATE PATIENT IS NOW TAKING 1 TABLET A NIGHT REGARDING MEDICATION TRAZODONE AND NOW ABLE TO REST ALL NIGHT.

## 2022-08-30 NOTE — PROGRESS NOTES
Chief Complaint  Hypertension, Atrial Fibrillation, and Hyperlipidemia    Subjective            Hilary Xie presents to [facility]  History of present illness:    Patient denies any palpitations.  She notes no bleeding problem.  She has not had to use any of the Lasix since we saw her last and notes no edema.  She still notes that her potassium was a little low and they had to put her back on potassium pills.  She denies any chest pain.      Past Medical History:   Diagnosis Date   • Atrial fibrillation (HCC)    • Hyperlipidemia    • Hypertension            History reviewed. No pertinent surgical history.       Social History     Socioeconomic History   • Marital status: Single   Tobacco Use   • Smoking status: Never Smoker   • Smokeless tobacco: Never Used   Vaping Use   • Vaping Use: Never used   Substance and Sexual Activity   • Alcohol use: Never   • Drug use: Never   • Sexual activity: Defer           Family History   Problem Relation Age of Onset   • Cancer Mother    • Diabetes Mother    • Cancer Father    • Heart failure Brother             Allergies   Allergen Reactions   • Donepezil Nausea Only   • Sulfa Antibiotics Hives            Current Outpatient Medications:   •  Cholecalciferol (Vitamin D3) 50 MCG (2000 UT) capsule, Take 1 capsule by mouth Daily., Disp: 90 capsule, Rfl: 1  •  dilTIAZem CD (CARDIZEM CD) 180 MG 24 hr capsule, Take 1 capsule by mouth Daily., Disp: 90 capsule, Rfl: 1  •  ferrous sulfate (FeroSul) 325 (65 FE) MG tablet, Take 1 tablet by mouth 2 (Two) Times a Day., Disp: 180 tablet, Rfl: 1  •  loratadine (CLARITIN) 10 MG tablet, Take 1 tablet by mouth Daily., Disp: 90 tablet, Rfl: 1  •  Namzaric 14-10 MG capsule sustained-release 24 hr, TAKE 1 CAPSULE BY MOUTH EVERY DAY IN THE EVENING, Disp: 30 capsule, Rfl: 5  •  OLANZapine (zyPREXA) 5 MG tablet, Take 1 tablet by mouth Daily., Disp: 90 tablet, Rfl: 1  •  potassium chloride (K-DUR,KLOR-CON) 10 MEQ CR tablet,  "Take 2 tablets by mouth Daily., Disp: 90 tablet, Rfl: 1  •  risedronate (ACTONEL) 150 MG tablet, Take 1 tablet by mouth Every 30 (Thirty) Days., Disp: 3 tablet, Rfl: 1  •  rivaroxaban (XARELTO) 20 MG tablet, Take 1 tablet by mouth Daily With Dinner., Disp: 90 tablet, Rfl: 3  •  rosuvastatin (CRESTOR) 20 MG tablet, Take 1 tablet by mouth Daily., Disp: 90 tablet, Rfl: 1  •  sertraline (ZOLOFT) 100 MG tablet, Take 1.5 tablets by mouth Daily., Disp: 135 tablet, Rfl: 1  •  traZODone (DESYREL) 50 MG tablet, Take 1/2 tablet 90 minutes prior to sleep, may increase to whole tablet if needed in about a week. (Patient taking differently: Take 50 mg by mouth Every Night. Take 1/2 tablet 90 minutes prior to sleep, may increase to whole tablet if needed in about a week.), Disp: 30 tablet, Rfl: 1      ROS:  Cardiac review of systems negative    Objective     /66   Pulse 64   Ht 157.5 cm (62\")   Wt 73.5 kg (162 lb)   BMI 29.63 kg/m²       General Appearance:   · well developed  · well nourished  HENT:   · oropharynx moist  · lips not cyanotic  Respiratory:  · no respiratory distress  · normal breath sounds  · no rales  Cardiovascular:  · no jugular venous distention  · regular rhythm  · S1 normal, S2 normal  · no S3, no S4   · no murmur  · no rub, no thrill  · No carotid bruit  · pedal pulses normal  · lower extremity edema: none    Musculoskeletal:  · no clubbing of fingers.   · normocephalic, head atraumatic  Skin:   · warm, dry  Psychiatric:  · judgement and insight appropriate  · normal mood and affect          Procedures                      ASSESSMENT:  Encounter Diagnoses   Name Primary?   • Longstanding persistent atrial fibrillation (HCC) Yes   • Primary hypertension    • Mixed hyperlipidemia          PLAN:    1.  Continue the Xarelto.  The patient notes no problems with bleeding.  2.  Heart rate is under good control.  3.  Patient had cholesterol checked in April 2022 with LDL 73, HDL 69, and triglycerides 153.  " These are under excellent control.  Continue the Crestor.  4.  Blood pressures under excellent control.  5.  Patient was having insomnia which she was recently placed on trazodone for.  This has seemed to help.  6.  Encourage the patient to start a regular exercise program walking 30 minutes 5 days a week.          Patient was given instructions and counseling regarding her condition or for health maintenance advice. Please see specific information pulled into the AVS if appropriate.         Elias Dougherty MD   8/30/2022  16:11 EDT

## 2022-09-27 RX ORDER — DILTIAZEM HYDROCHLORIDE 180 MG/1
CAPSULE, COATED, EXTENDED RELEASE ORAL
Qty: 90 CAPSULE | Refills: 1 | Status: SHIPPED | OUTPATIENT
Start: 2022-09-27

## 2022-09-27 RX ORDER — TRAZODONE HYDROCHLORIDE 50 MG/1
TABLET ORAL
Qty: 30 TABLET | Refills: 1 | Status: SHIPPED | OUTPATIENT
Start: 2022-09-27 | End: 2022-11-23 | Stop reason: SDUPTHER

## 2022-09-27 RX ORDER — LORATADINE 10 MG/1
TABLET ORAL
Qty: 30 TABLET | Refills: 5 | Status: SHIPPED | OUTPATIENT
Start: 2022-09-27

## 2022-09-27 RX ORDER — MEMANTINE HYDROCHLORIDE AND DONEPEZIL HYDROCHLORIDE 14; 10 MG/1; MG/1
CAPSULE ORAL
Qty: 30 CAPSULE | Refills: 5 | Status: SHIPPED | OUTPATIENT
Start: 2022-09-27

## 2022-09-27 RX ORDER — RISEDRONATE SODIUM 150 MG/1
TABLET, FILM COATED ORAL
Qty: 3 TABLET | Refills: 1 | Status: SHIPPED | OUTPATIENT
Start: 2022-09-27

## 2022-11-08 ENCOUNTER — LAB (OUTPATIENT)
Dept: LAB | Facility: HOSPITAL | Age: 77
End: 2022-11-08

## 2022-11-08 DIAGNOSIS — E78.2 MIXED HYPERLIPIDEMIA: ICD-10-CM

## 2022-11-08 DIAGNOSIS — E03.8 SUBCLINICAL HYPOTHYROIDISM: ICD-10-CM

## 2022-11-08 DIAGNOSIS — I10 PRIMARY HYPERTENSION: ICD-10-CM

## 2022-11-08 DIAGNOSIS — E11.9 TYPE 2 DIABETES MELLITUS WITHOUT COMPLICATION, WITHOUT LONG-TERM CURRENT USE OF INSULIN: ICD-10-CM

## 2022-11-08 LAB
ALBUMIN SERPL-MCNC: 3.6 G/DL (ref 3.5–5.2)
ALP SERPL-CCNC: 71 U/L (ref 39–117)
ALT SERPL W P-5'-P-CCNC: 5 U/L (ref 1–33)
ANION GAP SERPL CALCULATED.3IONS-SCNC: 8.4 MMOL/L (ref 5–15)
AST SERPL-CCNC: 15 U/L (ref 1–32)
BILIRUB CONJ SERPL-MCNC: <0.2 MG/DL (ref 0–0.3)
BILIRUB INDIRECT SERPL-MCNC: NORMAL MG/DL
BILIRUB SERPL-MCNC: 0.5 MG/DL (ref 0–1.2)
BUN SERPL-MCNC: 13 MG/DL (ref 8–23)
BUN/CREAT SERPL: 10.6 (ref 7–25)
CALCIUM SPEC-SCNC: 9.3 MG/DL (ref 8.6–10.5)
CHLORIDE SERPL-SCNC: 110 MMOL/L (ref 98–107)
CHOLEST SERPL-MCNC: 173 MG/DL (ref 0–200)
CO2 SERPL-SCNC: 22.6 MMOL/L (ref 22–29)
CREAT SERPL-MCNC: 1.23 MG/DL (ref 0.57–1)
EGFRCR SERPLBLD CKD-EPI 2021: 45.1 ML/MIN/1.73
GLUCOSE SERPL-MCNC: 161 MG/DL (ref 65–99)
HBA1C MFR BLD: 7 % (ref 4.8–5.6)
HDLC SERPL-MCNC: 67 MG/DL (ref 40–60)
LDLC SERPL CALC-MCNC: 89 MG/DL (ref 0–100)
LDLC/HDLC SERPL: 1.31 {RATIO}
POTASSIUM SERPL-SCNC: 4.2 MMOL/L (ref 3.5–5.2)
PROT SERPL-MCNC: 6.6 G/DL (ref 6–8.5)
SODIUM SERPL-SCNC: 141 MMOL/L (ref 136–145)
TRIGL SERPL-MCNC: 92 MG/DL (ref 0–150)
TSH SERPL DL<=0.05 MIU/L-ACNC: 2.71 UIU/ML (ref 0.27–4.2)
VLDLC SERPL-MCNC: 17 MG/DL (ref 5–40)

## 2022-11-08 PROCEDURE — 80048 BASIC METABOLIC PNL TOTAL CA: CPT

## 2022-11-08 PROCEDURE — 36415 COLL VENOUS BLD VENIPUNCTURE: CPT

## 2022-11-08 PROCEDURE — 80061 LIPID PANEL: CPT

## 2022-11-08 PROCEDURE — 84443 ASSAY THYROID STIM HORMONE: CPT

## 2022-11-08 PROCEDURE — 83036 HEMOGLOBIN GLYCOSYLATED A1C: CPT

## 2022-11-08 PROCEDURE — 80076 HEPATIC FUNCTION PANEL: CPT

## 2022-11-23 ENCOUNTER — OFFICE VISIT (OUTPATIENT)
Dept: INTERNAL MEDICINE | Facility: CLINIC | Age: 77
End: 2022-11-23

## 2022-11-23 VITALS
OXYGEN SATURATION: 97 % | WEIGHT: 167.2 LBS | TEMPERATURE: 97 F | BODY MASS INDEX: 30.77 KG/M2 | HEART RATE: 73 BPM | SYSTOLIC BLOOD PRESSURE: 110 MMHG | HEIGHT: 62 IN | DIASTOLIC BLOOD PRESSURE: 60 MMHG

## 2022-11-23 DIAGNOSIS — I48.11 LONGSTANDING PERSISTENT ATRIAL FIBRILLATION: Primary | ICD-10-CM

## 2022-11-23 DIAGNOSIS — I10 PRIMARY HYPERTENSION: ICD-10-CM

## 2022-11-23 DIAGNOSIS — F33.41 RECURRENT MAJOR DEPRESSION IN PARTIAL REMISSION: ICD-10-CM

## 2022-11-23 DIAGNOSIS — F03.90 MAJOR NEUROCOGNITIVE DISORDER: ICD-10-CM

## 2022-11-23 DIAGNOSIS — N18.32 STAGE 3B CHRONIC KIDNEY DISEASE: ICD-10-CM

## 2022-11-23 DIAGNOSIS — E11.9 TYPE 2 DIABETES MELLITUS WITHOUT COMPLICATION, WITHOUT LONG-TERM CURRENT USE OF INSULIN: ICD-10-CM

## 2022-11-23 PROCEDURE — 99214 OFFICE O/P EST MOD 30 MIN: CPT | Performed by: INTERNAL MEDICINE

## 2022-11-23 RX ORDER — TRAZODONE HYDROCHLORIDE 50 MG/1
TABLET ORAL
Qty: 135 TABLET | Refills: 1 | Status: SHIPPED | OUTPATIENT
Start: 2022-11-23 | End: 2023-01-26

## 2022-11-23 NOTE — ASSESSMENT & PLAN NOTE
GFR is stable at 45 as of her 11/22 office visit.  Potassium is up from 3.4 to 4.2 after the addition of 20 mill colons of potassium daily.  She is wasting potassium for some reason, is not having any diarrhea, but certainly fine to continue current dosing.

## 2022-11-23 NOTE — ASSESSMENT & PLAN NOTE
Patient remains in normal sinus as of her 11/22 office visit.  Heart rate is in the 70 ballpark.  She has Cardizem moderate dose on hand if needed for rate, and she is on Xarelto for CVA prevention, so continue same.

## 2022-11-23 NOTE — ASSESSMENT & PLAN NOTE
Sintia has been released a little bit beneficial, but still not where she would like to be as far as sleep in the evening.  She is also taking her Zyprexa and Namzaric in the evening.  We will titrate her from 50 to 75 mg of trazodone now, and if needed, in a week or 2, could go to 100.  Otherwise continue with these meds as written, along with sertraline in the a.m.

## 2022-11-23 NOTE — ASSESSMENT & PLAN NOTE
LDL of 89 is still at goal given primary prevention.  We will continue with moderate dose Crestor as written as of 11/22 OV.

## 2022-11-23 NOTE — ASSESSMENT & PLAN NOTE
Blood pressure remains well controlled as of her 11/22 office visit.  She is on Cardizem for associated A. fib, continue same going forward.

## 2022-11-23 NOTE — PROGRESS NOTES
"Chief Complaint  Hyperlipidemia and Follow-up (Pt states that this is routine, she had labs. She has no new issues. )    Subjective      Hilary Xie presents to [facility]    Hyperlipidemia  Pertinent negatives include no chest pain or shortness of breath.   Insomnia  Pertinent negatives include no abdominal pain, arthralgias, chest pain, congestion, coughing, fatigue or fever.     History of present illness:  Patient pleasant 77-year-old female with underlying PAF, DM, osteoporosis on treatment, among others, who was seen 2/22 as a New Pt and who is coming in 11/22 for routine 3-month follow-up.  We will review all her routine medications, go over her labs in detail, and address any new concerns as time permits.    Review of Systems   Constitutional: Negative for appetite change, fatigue and fever.   HENT: Negative for congestion and ear pain.    Eyes: Negative for blurred vision.   Respiratory: Negative for cough, chest tightness, shortness of breath and wheezing.    Cardiovascular: Negative for chest pain, palpitations and leg swelling.   Gastrointestinal: Negative for abdominal pain.   Genitourinary: Negative for difficulty urinating, dysuria and hematuria.   Musculoskeletal: Negative for arthralgias and gait problem.   Skin: Negative for skin lesions.   Neurological: Negative for syncope, memory problem and confusion.   Psychiatric/Behavioral: Negative for self-injury and depressed mood. The patient has insomnia.        Objective   Vital Signs:   /60   Pulse 73   Temp 97 °F (36.1 °C) (Skin)   Ht 157.5 cm (62.01\")   Wt 75.8 kg (167 lb 3.2 oz)   SpO2 97%   BMI 30.57 kg/m²           Physical Exam  Vitals and nursing note reviewed.   Constitutional:       General: She is not in acute distress.     Appearance: Normal appearance. She is not toxic-appearing.   HENT:      Head: Atraumatic.      Right Ear: External ear normal.      Left Ear: External ear normal.      Nose: " Nose normal.      Mouth/Throat:      Mouth: Mucous membranes are moist.   Eyes:      General:         Right eye: No discharge.         Left eye: No discharge.      Extraocular Movements: Extraocular movements intact.      Pupils: Pupils are equal, round, and reactive to light.   Cardiovascular:      Rate and Rhythm: Normal rate and regular rhythm.      Pulses: Normal pulses.      Heart sounds: Normal heart sounds. No murmur heard.    No gallop.   Pulmonary:      Effort: Pulmonary effort is normal. No respiratory distress.      Breath sounds: No wheezing, rhonchi or rales.   Abdominal:      General: There is no distension.      Palpations: Abdomen is soft. There is no mass.      Tenderness: There is no abdominal tenderness. There is no guarding.   Musculoskeletal:         General: No swelling or tenderness.      Cervical back: No tenderness.      Right lower leg: No edema.      Left lower leg: No edema.   Skin:     General: Skin is warm and dry.      Findings: No rash.   Neurological:      General: No focal deficit present.      Mental Status: She is alert and oriented to person, place, and time. Mental status is at baseline.      Motor: No weakness.      Gait: Gait normal.   Psychiatric:         Mood and Affect: Mood normal.         Thought Content: Thought content normal.          Result Review   The following data was reviewed by: Clarence Martínez MD on 02/14/2022:  [x] Laboratory  [] Microbiology  [] Radiology  [] EKG/telemetry  [] Cardiology/Vascular  [] Pathology  [x] Old records             Assessment and Plan   Diagnoses and all orders for this visit:    1. Longstanding persistent atrial fibrillation (HCC) (Primary)  Overview:  Since 1980's.  Cardioverted x1.  More c/w PAF.  Followed by Dr. Chamberlain.      Assessment & Plan:  Patient remains in normal sinus as of her 11/22 office visit.  Heart rate is in the 70 ballpark.  She has Cardizem moderate dose on hand if needed for rate, and she is on Xarelto for CVA  prevention, so continue same.      2. Major neurocognitive disorder (HCC)  Overview:  Dewayne Kong MD  = note from 10/21 was reviewed.    Assessment & Plan:  As noted in the heading above in regards to mood disorder, changes being made 11/22.      3. Primary hypertension  Assessment & Plan:  Blood pressure remains well controlled as of her 11/22 office visit.  She is on Cardizem for associated A. fib, continue same going forward.      4. Stage 3b chronic kidney disease (HCC)  Assessment & Plan:  GFR is stable at 45 as of her 11/22 office visit.  Potassium is up from 3.4 to 4.2 after the addition of 20 mill colons of potassium daily.  She is wasting potassium for some reason, is not having any diarrhea, but certainly fine to continue current dosing.    Orders:  -     Basic Metabolic Panel; Future    5. Type 2 diabetes mellitus without complication, without long-term current use of insulin (HCC)  Assessment & Plan:  A1c is slowly creeping, its 7.0 as of 11/22 office visit.  This is still very reasonable, will continue to hold medications, and patient will watch the carbohydrates.    Orders:  -     Hemoglobin A1c; Future    6. Recurrent major depression in partial remission (HCC)  Assessment & Plan:  Sintia has been released a little bit beneficial, but still not where she would like to be as far as sleep in the evening.  She is also taking her Zyprexa and Namzaric in the evening.  We will titrate her from 50 to 75 mg of trazodone now, and if needed, in a week or 2, could go to 100.  Otherwise continue with these meds as written, along with sertraline in the a.m.      Other orders  -     traZODone (DESYREL) 50 MG tablet; Take 1-1/2 tablets every evening, may increase to 2 tablets if needed.  Dispense: 135 tablet; Refill: 1           Follow Up   Return in about 3 months (around 2/23/2023).  Patient was given instructions and counseling regarding her condition or for health maintenance advice. Please see specific  information pulled into the AVS if appropriate.

## 2022-11-23 NOTE — ASSESSMENT & PLAN NOTE
A1c is slowly creeping, its 7.0 as of 11/22 office visit.  This is still very reasonable, will continue to hold medications, and patient will watch the carbohydrates.

## 2023-01-26 RX ORDER — TRAZODONE HYDROCHLORIDE 50 MG/1
TABLET ORAL
Qty: 135 TABLET | Refills: 1 | Status: SHIPPED | OUTPATIENT
Start: 2023-01-26 | End: 2023-02-15 | Stop reason: SDUPTHER

## 2023-02-02 DIAGNOSIS — F33.41 RECURRENT MAJOR DEPRESSION IN PARTIAL REMISSION: ICD-10-CM

## 2023-02-03 RX ORDER — SERTRALINE HYDROCHLORIDE 100 MG/1
TABLET, FILM COATED ORAL
Qty: 135 TABLET | Refills: 1 | Status: SHIPPED | OUTPATIENT
Start: 2023-02-03 | End: 2023-02-15 | Stop reason: SDUPTHER

## 2023-02-13 ENCOUNTER — LAB (OUTPATIENT)
Dept: LAB | Facility: HOSPITAL | Age: 78
End: 2023-02-13
Payer: MEDICARE

## 2023-02-13 DIAGNOSIS — E11.9 TYPE 2 DIABETES MELLITUS WITHOUT COMPLICATION, WITHOUT LONG-TERM CURRENT USE OF INSULIN: ICD-10-CM

## 2023-02-13 DIAGNOSIS — N18.32 STAGE 3B CHRONIC KIDNEY DISEASE: ICD-10-CM

## 2023-02-13 LAB
ANION GAP SERPL CALCULATED.3IONS-SCNC: 10 MMOL/L (ref 5–15)
BUN SERPL-MCNC: 15 MG/DL (ref 8–23)
BUN/CREAT SERPL: 10.6 (ref 7–25)
CALCIUM SPEC-SCNC: 9.2 MG/DL (ref 8.6–10.5)
CHLORIDE SERPL-SCNC: 107 MMOL/L (ref 98–107)
CO2 SERPL-SCNC: 23 MMOL/L (ref 22–29)
CREAT SERPL-MCNC: 1.42 MG/DL (ref 0.57–1)
EGFRCR SERPLBLD CKD-EPI 2021: 37.9 ML/MIN/1.73
GLUCOSE SERPL-MCNC: 237 MG/DL (ref 65–99)
HBA1C MFR BLD: 7.2 % (ref 4.8–5.6)
POTASSIUM SERPL-SCNC: 4.1 MMOL/L (ref 3.5–5.2)
SODIUM SERPL-SCNC: 140 MMOL/L (ref 136–145)

## 2023-02-13 PROCEDURE — 36415 COLL VENOUS BLD VENIPUNCTURE: CPT

## 2023-02-13 PROCEDURE — 80048 BASIC METABOLIC PNL TOTAL CA: CPT

## 2023-02-13 PROCEDURE — 83036 HEMOGLOBIN GLYCOSYLATED A1C: CPT

## 2023-02-13 RX ORDER — ROSUVASTATIN CALCIUM 20 MG/1
20 TABLET, COATED ORAL DAILY
Qty: 90 TABLET | Refills: 1 | Status: SHIPPED | OUTPATIENT
Start: 2023-02-13 | End: 2023-02-15 | Stop reason: SDUPTHER

## 2023-02-13 RX ORDER — POTASSIUM CHLORIDE 20 MEQ/1
TABLET, EXTENDED RELEASE ORAL
Qty: 90 TABLET | Refills: 1 | OUTPATIENT
Start: 2023-02-13

## 2023-02-15 ENCOUNTER — OFFICE VISIT (OUTPATIENT)
Dept: INTERNAL MEDICINE | Facility: CLINIC | Age: 78
End: 2023-02-15
Payer: MEDICARE

## 2023-02-15 VITALS
HEART RATE: 58 BPM | OXYGEN SATURATION: 97 % | WEIGHT: 168.8 LBS | HEIGHT: 62 IN | SYSTOLIC BLOOD PRESSURE: 123 MMHG | DIASTOLIC BLOOD PRESSURE: 54 MMHG | BODY MASS INDEX: 31.06 KG/M2 | TEMPERATURE: 98.4 F

## 2023-02-15 DIAGNOSIS — F33.41 RECURRENT MAJOR DEPRESSION IN PARTIAL REMISSION: ICD-10-CM

## 2023-02-15 DIAGNOSIS — E11.9 TYPE 2 DIABETES MELLITUS WITHOUT COMPLICATION, WITHOUT LONG-TERM CURRENT USE OF INSULIN: ICD-10-CM

## 2023-02-15 DIAGNOSIS — I48.11 LONGSTANDING PERSISTENT ATRIAL FIBRILLATION: ICD-10-CM

## 2023-02-15 DIAGNOSIS — E78.2 MIXED HYPERLIPIDEMIA: ICD-10-CM

## 2023-02-15 DIAGNOSIS — D50.8 IRON DEFICIENCY ANEMIA SECONDARY TO INADEQUATE DIETARY IRON INTAKE: ICD-10-CM

## 2023-02-15 DIAGNOSIS — I10 PRIMARY HYPERTENSION: ICD-10-CM

## 2023-02-15 DIAGNOSIS — M81.0 AGE-RELATED OSTEOPOROSIS WITHOUT CURRENT PATHOLOGICAL FRACTURE: ICD-10-CM

## 2023-02-15 DIAGNOSIS — N18.32 STAGE 3B CHRONIC KIDNEY DISEASE: Primary | ICD-10-CM

## 2023-02-15 PROCEDURE — 99214 OFFICE O/P EST MOD 30 MIN: CPT | Performed by: INTERNAL MEDICINE

## 2023-02-15 RX ORDER — SERTRALINE HYDROCHLORIDE 100 MG/1
150 TABLET, FILM COATED ORAL DAILY
Qty: 135 TABLET | Refills: 1 | Status: SHIPPED | OUTPATIENT
Start: 2023-02-15

## 2023-02-15 RX ORDER — ROSUVASTATIN CALCIUM 20 MG/1
20 TABLET, COATED ORAL DAILY
Qty: 90 TABLET | Refills: 1 | Status: SHIPPED | OUTPATIENT
Start: 2023-02-15

## 2023-02-15 RX ORDER — TRAZODONE HYDROCHLORIDE 100 MG/1
100 TABLET ORAL NIGHTLY
Qty: 90 TABLET | Refills: 1 | Status: SHIPPED | OUTPATIENT
Start: 2023-02-15

## 2023-02-15 RX ORDER — POTASSIUM CHLORIDE 750 MG/1
20 TABLET, EXTENDED RELEASE ORAL DAILY
Qty: 90 TABLET | Refills: 1 | Status: SHIPPED | OUTPATIENT
Start: 2023-02-15

## 2023-02-15 NOTE — ASSESSMENT & PLAN NOTE
A1c is 7.2 as of her 2/23 office visit.  We have yet to initiate treatment, will consider Jardiance given her underlying CKD when meds definitely indicated.

## 2023-02-15 NOTE — ASSESSMENT & PLAN NOTE
GFR is 37 as of her 2/23 office visit.  Like this the first time she is dipped below 40, we will continue to keep an eye on this.  Electrolytes are certainly stable, there is no acidosis.  Still requires low-dose potassium since level not elevated on it presently.

## 2023-02-15 NOTE — ASSESSMENT & PLAN NOTE
Mood seems appropriate as of 2/23 office visit.  Patient still reports decreased sleep, so we will titrate trazodone to 100 mg at this time.  Additionally she is maintained on moderate dose of sertraline and low-dose Zyprexa, continue same.  Namzaric is on board as well.

## 2023-02-15 NOTE — ASSESSMENT & PLAN NOTE
Blood pressure remains well controlled as of her 2/23 office visit.  She is only on Cardizem which she uses for her A-fib, continue same.

## 2023-02-15 NOTE — PROGRESS NOTES
"Chief Complaint  Hyperlipidemia, Follow-up (Pt states that this is routine, she had labs and she has no new issues. ), and Insomnia (She is still having trouble staying asleep, but she is on medication for this. )    Subjective      Hilary Xie presents to [facility]    Hyperlipidemia  Pertinent negatives include no chest pain or shortness of breath.   Insomnia  Pertinent negatives include no abdominal pain, arthralgias, chest pain, congestion, coughing, fatigue or fever.     History of present illness:  Patient pleasant 77-year-old female with underlying PAF, DM, osteoporosis on treatment, among others, who was seen 2/22 as a New Pt and who is coming in 2/23 for routine 3-month follow-up.  We will review all her routine medications, go over her labs in detail, and address any new concerns as time permits.    Review of Systems   Constitutional: Negative for appetite change, fatigue and fever.   HENT: Negative for congestion and ear pain.    Eyes: Negative for blurred vision.   Respiratory: Negative for cough, chest tightness, shortness of breath and wheezing.    Cardiovascular: Negative for chest pain, palpitations and leg swelling.   Gastrointestinal: Negative for abdominal pain.   Genitourinary: Negative for difficulty urinating, dysuria and hematuria.   Musculoskeletal: Negative for arthralgias and gait problem.   Skin: Negative for skin lesions.   Neurological: Negative for syncope, memory problem and confusion.   Psychiatric/Behavioral: Negative for self-injury and depressed mood. The patient has insomnia.        Objective   Vital Signs:   /54   Pulse 58   Temp 98.4 °F (36.9 °C) (Skin)   Ht 157.5 cm (62.01\")   Wt 76.6 kg (168 lb 12.8 oz)   SpO2 97%   BMI 30.87 kg/m²           Physical Exam  Vitals and nursing note reviewed.   Constitutional:       General: She is not in acute distress.     Appearance: Normal appearance. She is not toxic-appearing.   HENT:      " Head: Atraumatic.      Right Ear: External ear normal.      Left Ear: External ear normal.      Nose: Nose normal.      Mouth/Throat:      Mouth: Mucous membranes are moist.   Eyes:      General:         Right eye: No discharge.         Left eye: No discharge.      Extraocular Movements: Extraocular movements intact.      Pupils: Pupils are equal, round, and reactive to light.   Cardiovascular:      Rate and Rhythm: Normal rate and regular rhythm.      Pulses: Normal pulses.      Heart sounds: Normal heart sounds. No murmur heard.    No gallop.   Pulmonary:      Effort: Pulmonary effort is normal. No respiratory distress.      Breath sounds: No wheezing, rhonchi or rales.   Abdominal:      General: There is no distension.      Palpations: Abdomen is soft. There is no mass.      Tenderness: There is no abdominal tenderness. There is no guarding.   Musculoskeletal:         General: No swelling or tenderness.      Cervical back: No tenderness.      Right lower leg: No edema.      Left lower leg: No edema.   Skin:     General: Skin is warm and dry.      Findings: No rash.   Neurological:      General: No focal deficit present.      Mental Status: She is alert and oriented to person, place, and time. Mental status is at baseline.      Motor: No weakness.      Gait: Gait normal.   Psychiatric:         Mood and Affect: Mood normal.         Thought Content: Thought content normal.          Result Review   The following data was reviewed by: Clarence Martínez MD on 02/14/2022:  [x] Laboratory  [] Microbiology  [] Radiology  [] EKG/telemetry  [] Cardiology/Vascular  [] Pathology  [x] Old records             Assessment and Plan   Diagnoses and all orders for this visit:    1. Stage 3b chronic kidney disease (HCC) (Primary)  Assessment & Plan:  GFR is 37 as of her 2/23 office visit.  Like this the first time she is dipped below 40, we will continue to keep an eye on this.  Electrolytes are certainly stable, there is no acidosis.   Still requires low-dose potassium since level not elevated on it presently.    Orders:  -     CBC & Differential; Future    2. Recurrent major depression in partial remission (HCC)  Assessment & Plan:  Mood seems appropriate as of 2/23 office visit.  Patient still reports decreased sleep, so we will titrate trazodone to 100 mg at this time.  Additionally she is maintained on moderate dose of sertraline and low-dose Zyprexa, continue same.  Namzaric is on board as well.    Orders:  -     sertraline (ZOLOFT) 100 MG tablet; Take 1.5 tablets by mouth Daily.  Dispense: 135 tablet; Refill: 1    3. Longstanding persistent atrial fibrillation (HCC)  Overview:  Since 1980's.  Cardioverted x1.  Followed annually by Dr. Chamberlain.      Assessment & Plan:  Patient appears to be in controlled A-fib, rate is in the 60s, she is on low-dose Cardizem and should continue same.  Additionally he is on Xarelto for CVA prevention, continue same for now, but may need to switch her to Eliquis if GFR down further this year.      4. Primary hypertension  Assessment & Plan:  Blood pressure remains well controlled as of her 2/23 office visit.  She is only on Cardizem which she uses for her A-fib, continue same.    Orders:  -     Comprehensive Metabolic Panel; Future    5. Type 2 diabetes mellitus without complication, without long-term current use of insulin ([facility])  Assessment & Plan:  A1c is 7.2 as of her 2/23 office visit.  We have yet to initiate treatment, will consider Jardiance given her underlying CKD when meds definitely indicated.    Orders:  -     Hemoglobin A1c; Future    6. Mixed hyperlipidemia  -     Lipid Panel; Future    7. Age-related osteoporosis without current pathological fracture  -     DEXA Bone Density Axial; Future    8. Iron deficiency anemia secondary to inadequate dietary iron intake  Overview:  EGD 2021 with no significant findings reported.  COLON 2019 was negative = no more.    Orders:  -     Iron Profile; Future  -      Ferritin; Future    Other orders  -     rosuvastatin (CRESTOR) 20 MG tablet; Take 1 tablet by mouth Daily.  Dispense: 90 tablet; Refill: 1  -     potassium chloride (K-DUR,KLOR-CON) 10 MEQ CR tablet; Take 2 tablets by mouth Daily.  Dispense: 90 tablet; Refill: 1  -     traZODone (DESYREL) 100 MG tablet; Take 1 tablet by mouth Every Night. TAKE 1 & 1/2 TABLETS BY MOUTH EVERY EVENING, MAY INCREASE TO 2 TABLETS IF NEEDED  Dispense: 90 tablet; Refill: 1           Follow Up   Return in about 3 months (around 5/15/2023).  Patient was given instructions and counseling regarding her condition or for health maintenance advice. Please see specific information pulled into the AVS if appropriate.

## 2023-02-15 NOTE — ASSESSMENT & PLAN NOTE
Patient appears to be in controlled A-fib, rate is in the 60s, she is on low-dose Cardizem and should continue same.  Additionally he is on Xarelto for CVA prevention, continue same for now, but may need to switch her to Eliquis if GFR down further this year.

## 2023-02-23 RX ORDER — ACETAMINOPHEN 160 MG
2000 TABLET,DISINTEGRATING ORAL DAILY
Qty: 90 CAPSULE | Refills: 1 | Status: SHIPPED | OUTPATIENT
Start: 2023-02-23

## 2023-03-08 ENCOUNTER — OFFICE VISIT (OUTPATIENT)
Dept: INTERNAL MEDICINE | Facility: CLINIC | Age: 78
End: 2023-03-08
Payer: MEDICARE

## 2023-03-08 ENCOUNTER — HOSPITAL ENCOUNTER (INPATIENT)
Facility: HOSPITAL | Age: 78
LOS: 3 days | Discharge: HOME | DRG: 177 | End: 2023-03-11
Attending: EMERGENCY MEDICINE | Admitting: INTERNAL MEDICINE
Payer: MEDICARE

## 2023-03-08 ENCOUNTER — APPOINTMENT (OUTPATIENT)
Dept: GENERAL RADIOLOGY | Facility: HOSPITAL | Age: 78
DRG: 177 | End: 2023-03-08
Payer: MEDICARE

## 2023-03-08 VITALS
TEMPERATURE: 97.1 F | SYSTOLIC BLOOD PRESSURE: 123 MMHG | WEIGHT: 162.8 LBS | HEIGHT: 62 IN | OXYGEN SATURATION: 96 % | HEART RATE: 79 BPM | RESPIRATION RATE: 18 BRPM | DIASTOLIC BLOOD PRESSURE: 73 MMHG | BODY MASS INDEX: 29.96 KG/M2

## 2023-03-08 DIAGNOSIS — U07.1 COVID-19 VIRUS INFECTION: Primary | ICD-10-CM

## 2023-03-08 DIAGNOSIS — Z78.9 DECREASED ACTIVITIES OF DAILY LIVING (ADL): ICD-10-CM

## 2023-03-08 DIAGNOSIS — R09.02 HYPOXIA: ICD-10-CM

## 2023-03-08 DIAGNOSIS — U07.1 COVID-19: Primary | ICD-10-CM

## 2023-03-08 DIAGNOSIS — R53.1 WEAKNESS: ICD-10-CM

## 2023-03-08 DIAGNOSIS — R26.2 DIFFICULTY WALKING: ICD-10-CM

## 2023-03-08 LAB
ALBUMIN SERPL-MCNC: 3.7 G/DL (ref 3.5–5.2)
ALBUMIN/GLOB SERPL: 1.3 G/DL
ALP SERPL-CCNC: 71 U/L (ref 39–117)
ALT SERPL W P-5'-P-CCNC: 11 U/L (ref 1–33)
ANION GAP SERPL CALCULATED.3IONS-SCNC: 12.2 MMOL/L (ref 5–15)
AST SERPL-CCNC: 14 U/L (ref 1–32)
BASOPHILS # BLD AUTO: 0.01 10*3/MM3 (ref 0–0.2)
BASOPHILS NFR BLD AUTO: 0.3 % (ref 0–1.5)
BILIRUB SERPL-MCNC: 0.7 MG/DL (ref 0–1.2)
BUN SERPL-MCNC: 18 MG/DL (ref 8–23)
BUN/CREAT SERPL: 12.7 (ref 7–25)
CALCIUM SPEC-SCNC: 9.2 MG/DL (ref 8.6–10.5)
CHLORIDE SERPL-SCNC: 105 MMOL/L (ref 98–107)
CHOLEST SERPL-MCNC: 129 MG/DL (ref 0–200)
CO2 SERPL-SCNC: 21.8 MMOL/L (ref 22–29)
CREAT SERPL-MCNC: 1.42 MG/DL (ref 0.57–1)
DEPRECATED RDW RBC AUTO: 43 FL (ref 37–54)
EGFRCR SERPLBLD CKD-EPI 2021: 37.9 ML/MIN/1.73
EOSINOPHIL # BLD AUTO: 0.03 10*3/MM3 (ref 0–0.4)
EOSINOPHIL NFR BLD AUTO: 0.9 % (ref 0.3–6.2)
ERYTHROCYTE [DISTWIDTH] IN BLOOD BY AUTOMATED COUNT: 13.4 % (ref 12.3–15.4)
EXPIRATION DATE: ABNORMAL
FLUAV AG UPPER RESP QL IA.RAPID: NOT DETECTED
FLUBV AG UPPER RESP QL IA.RAPID: NOT DETECTED
GIANT PLATELETS: NORMAL
GLOBULIN UR ELPH-MCNC: 2.9 GM/DL
GLUCOSE BLDC GLUCOMTR-MCNC: 185 MG/DL (ref 70–99)
GLUCOSE SERPL-MCNC: 249 MG/DL (ref 65–99)
HCT VFR BLD AUTO: 41 % (ref 34–46.6)
HDLC SERPL-MCNC: 44 MG/DL (ref 40–60)
HGB BLD-MCNC: 14 G/DL (ref 12–15.9)
HOLD SPECIMEN: NORMAL
HOLD SPECIMEN: NORMAL
IMM GRANULOCYTES # BLD AUTO: 0.02 10*3/MM3 (ref 0–0.05)
IMM GRANULOCYTES NFR BLD AUTO: 0.6 % (ref 0–0.5)
INR PPP: 1.12 (ref 0.86–1.15)
INTERNAL CONTROL: ABNORMAL
LDLC SERPL CALC-MCNC: 61 MG/DL (ref 0–100)
LDLC/HDLC SERPL: 1.31 {RATIO}
LYMPHOCYTES # BLD AUTO: 0.88 10*3/MM3 (ref 0.7–3.1)
LYMPHOCYTES NFR BLD AUTO: 25 % (ref 19.6–45.3)
Lab: ABNORMAL
MCH RBC QN AUTO: 29.9 PG (ref 26.6–33)
MCHC RBC AUTO-ENTMCNC: 34.1 G/DL (ref 31.5–35.7)
MCV RBC AUTO: 87.6 FL (ref 79–97)
MONOCYTES # BLD AUTO: 0.29 10*3/MM3 (ref 0.1–0.9)
MONOCYTES NFR BLD AUTO: 8.2 % (ref 5–12)
NEUTROPHILS NFR BLD AUTO: 2.29 10*3/MM3 (ref 1.7–7)
NEUTROPHILS NFR BLD AUTO: 65 % (ref 42.7–76)
NRBC BLD AUTO-RTO: 0 /100 WBC (ref 0–0.2)
NT-PROBNP SERPL-MCNC: 566.2 PG/ML (ref 0–1800)
PLATELET # BLD AUTO: 88 10*3/MM3 (ref 140–450)
PMV BLD AUTO: 12.3 FL (ref 6–12)
POTASSIUM SERPL-SCNC: 3.5 MMOL/L (ref 3.5–5.2)
PROT SERPL-MCNC: 6.6 G/DL (ref 6–8.5)
PROTHROMBIN TIME: 14.5 SECONDS (ref 11.8–14.9)
RBC # BLD AUTO: 4.68 10*6/MM3 (ref 3.77–5.28)
RBC MORPH BLD: NORMAL
SARS-COV-2 AG UPPER RESP QL IA.RAPID: DETECTED
SMALL PLATELETS BLD QL SMEAR: NORMAL
SODIUM SERPL-SCNC: 139 MMOL/L (ref 136–145)
TRIGL SERPL-MCNC: 136 MG/DL (ref 0–150)
TROPONIN T SERPL HS-MCNC: 14 NG/L
VLDLC SERPL-MCNC: 24 MG/DL (ref 5–40)
WBC MORPH BLD: NORMAL
WBC NRBC COR # BLD: 3.52 10*3/MM3 (ref 3.4–10.8)
WHOLE BLOOD HOLD COAG: NORMAL
WHOLE BLOOD HOLD SPECIMEN: NORMAL

## 2023-03-08 PROCEDURE — 82962 GLUCOSE BLOOD TEST: CPT

## 2023-03-08 PROCEDURE — 99284 EMERGENCY DEPT VISIT MOD MDM: CPT

## 2023-03-08 PROCEDURE — 99223 1ST HOSP IP/OBS HIGH 75: CPT | Performed by: INTERNAL MEDICINE

## 2023-03-08 PROCEDURE — 93005 ELECTROCARDIOGRAM TRACING: CPT

## 2023-03-08 PROCEDURE — 85025 COMPLETE CBC W/AUTO DIFF WBC: CPT

## 2023-03-08 PROCEDURE — 85007 BL SMEAR W/DIFF WBC COUNT: CPT

## 2023-03-08 PROCEDURE — 80053 COMPREHEN METABOLIC PANEL: CPT

## 2023-03-08 PROCEDURE — 83880 ASSAY OF NATRIURETIC PEPTIDE: CPT

## 2023-03-08 PROCEDURE — 94640 AIRWAY INHALATION TREATMENT: CPT

## 2023-03-08 PROCEDURE — 99214 OFFICE O/P EST MOD 30 MIN: CPT | Performed by: NURSE PRACTITIONER

## 2023-03-08 PROCEDURE — 85610 PROTHROMBIN TIME: CPT | Performed by: INTERNAL MEDICINE

## 2023-03-08 PROCEDURE — 83036 HEMOGLOBIN GLYCOSYLATED A1C: CPT | Performed by: INTERNAL MEDICINE

## 2023-03-08 PROCEDURE — 71045 X-RAY EXAM CHEST 1 VIEW: CPT

## 2023-03-08 PROCEDURE — 94799 UNLISTED PULMONARY SVC/PX: CPT

## 2023-03-08 PROCEDURE — 63710000001 INSULIN LISPRO (HUMAN) PER 5 UNITS: Performed by: INTERNAL MEDICINE

## 2023-03-08 PROCEDURE — 84484 ASSAY OF TROPONIN QUANT: CPT

## 2023-03-08 PROCEDURE — 93010 ELECTROCARDIOGRAM REPORT: CPT | Performed by: SPECIALIST

## 2023-03-08 PROCEDURE — 25010000002 DEXAMETHASONE SODIUM PHOSPHATE 10 MG/ML SOLUTION: Performed by: EMERGENCY MEDICINE

## 2023-03-08 PROCEDURE — 36415 COLL VENOUS BLD VENIPUNCTURE: CPT

## 2023-03-08 PROCEDURE — 80061 LIPID PANEL: CPT | Performed by: INTERNAL MEDICINE

## 2023-03-08 PROCEDURE — 87428 SARSCOV & INF VIR A&B AG IA: CPT | Performed by: NURSE PRACTITIONER

## 2023-03-08 RX ORDER — IPRATROPIUM BROMIDE AND ALBUTEROL SULFATE 2.5; .5 MG/3ML; MG/3ML
3 SOLUTION RESPIRATORY (INHALATION)
Status: DISCONTINUED | OUTPATIENT
Start: 2023-03-09 | End: 2023-03-11 | Stop reason: HOSPADM

## 2023-03-08 RX ORDER — DEXAMETHASONE SODIUM PHOSPHATE 10 MG/ML
6 INJECTION, SOLUTION INTRAMUSCULAR; INTRAVENOUS ONCE
Status: COMPLETED | OUTPATIENT
Start: 2023-03-08 | End: 2023-03-08

## 2023-03-08 RX ORDER — ROSUVASTATIN CALCIUM 20 MG/1
20 TABLET, COATED ORAL NIGHTLY
Status: DISCONTINUED | OUTPATIENT
Start: 2023-03-08 | End: 2023-03-11 | Stop reason: HOSPADM

## 2023-03-08 RX ORDER — ARFORMOTEROL TARTRATE 15 UG/2ML
15 SOLUTION RESPIRATORY (INHALATION)
Status: DISCONTINUED | OUTPATIENT
Start: 2023-03-08 | End: 2023-03-11 | Stop reason: HOSPADM

## 2023-03-08 RX ORDER — AMOXICILLIN 250 MG
2 CAPSULE ORAL 2 TIMES DAILY
Status: DISCONTINUED | OUTPATIENT
Start: 2023-03-08 | End: 2023-03-11 | Stop reason: HOSPADM

## 2023-03-08 RX ORDER — BISACODYL 5 MG/1
5 TABLET, DELAYED RELEASE ORAL DAILY PRN
Status: DISCONTINUED | OUTPATIENT
Start: 2023-03-08 | End: 2023-03-11 | Stop reason: HOSPADM

## 2023-03-08 RX ORDER — BISACODYL 10 MG
10 SUPPOSITORY, RECTAL RECTAL DAILY PRN
Status: DISCONTINUED | OUTPATIENT
Start: 2023-03-08 | End: 2023-03-11 | Stop reason: HOSPADM

## 2023-03-08 RX ORDER — MULTIPLE VITAMINS W/ MINERALS TAB 9MG-400MCG
1 TAB ORAL DAILY
Status: DISCONTINUED | OUTPATIENT
Start: 2023-03-09 | End: 2023-03-11 | Stop reason: HOSPADM

## 2023-03-08 RX ORDER — SODIUM CHLORIDE 9 MG/ML
40 INJECTION, SOLUTION INTRAVENOUS AS NEEDED
Status: DISCONTINUED | OUTPATIENT
Start: 2023-03-08 | End: 2023-03-11 | Stop reason: HOSPADM

## 2023-03-08 RX ORDER — IPRATROPIUM BROMIDE AND ALBUTEROL SULFATE 2.5; .5 MG/3ML; MG/3ML
3 SOLUTION RESPIRATORY (INHALATION) ONCE
Status: COMPLETED | OUTPATIENT
Start: 2023-03-08 | End: 2023-03-08

## 2023-03-08 RX ORDER — BUDESONIDE 0.5 MG/2ML
0.5 INHALANT ORAL
Status: DISCONTINUED | OUTPATIENT
Start: 2023-03-08 | End: 2023-03-11 | Stop reason: HOSPADM

## 2023-03-08 RX ORDER — TRAZODONE HYDROCHLORIDE 100 MG/1
100 TABLET ORAL NIGHTLY
Status: DISCONTINUED | OUTPATIENT
Start: 2023-03-08 | End: 2023-03-11 | Stop reason: HOSPADM

## 2023-03-08 RX ORDER — DEXAMETHASONE SODIUM PHOSPHATE 10 MG/ML
6 INJECTION, SOLUTION INTRAMUSCULAR; INTRAVENOUS DAILY
Status: DISCONTINUED | OUTPATIENT
Start: 2023-03-09 | End: 2023-03-09

## 2023-03-08 RX ORDER — SODIUM CHLORIDE 0.9 % (FLUSH) 0.9 %
10 SYRINGE (ML) INJECTION EVERY 12 HOURS SCHEDULED
Status: DISCONTINUED | OUTPATIENT
Start: 2023-03-08 | End: 2023-03-11 | Stop reason: HOSPADM

## 2023-03-08 RX ORDER — SODIUM CHLORIDE 0.9 % (FLUSH) 0.9 %
10 SYRINGE (ML) INJECTION AS NEEDED
Status: DISCONTINUED | OUTPATIENT
Start: 2023-03-08 | End: 2023-03-11 | Stop reason: HOSPADM

## 2023-03-08 RX ORDER — POLYETHYLENE GLYCOL 3350 17 G/17G
17 POWDER, FOR SOLUTION ORAL DAILY PRN
Status: DISCONTINUED | OUTPATIENT
Start: 2023-03-08 | End: 2023-03-11 | Stop reason: HOSPADM

## 2023-03-08 RX ORDER — ONDANSETRON 2 MG/ML
4 INJECTION INTRAMUSCULAR; INTRAVENOUS EVERY 6 HOURS PRN
Status: DISCONTINUED | OUTPATIENT
Start: 2023-03-08 | End: 2023-03-11 | Stop reason: HOSPADM

## 2023-03-08 RX ORDER — CHOLECALCIFEROL (VITAMIN D3) 125 MCG
5 CAPSULE ORAL NIGHTLY PRN
Status: DISCONTINUED | OUTPATIENT
Start: 2023-03-08 | End: 2023-03-11 | Stop reason: HOSPADM

## 2023-03-08 RX ORDER — OLANZAPINE 5 MG/1
5 TABLET ORAL DAILY
Status: DISCONTINUED | OUTPATIENT
Start: 2023-03-09 | End: 2023-03-11 | Stop reason: HOSPADM

## 2023-03-08 RX ORDER — DEXTROSE MONOHYDRATE 25 G/50ML
25 INJECTION, SOLUTION INTRAVENOUS
Status: DISCONTINUED | OUTPATIENT
Start: 2023-03-08 | End: 2023-03-11 | Stop reason: HOSPADM

## 2023-03-08 RX ORDER — FAMOTIDINE 20 MG/1
40 TABLET, FILM COATED ORAL DAILY
Status: DISCONTINUED | OUTPATIENT
Start: 2023-03-09 | End: 2023-03-11 | Stop reason: HOSPADM

## 2023-03-08 RX ORDER — NICOTINE POLACRILEX 4 MG
15 LOZENGE BUCCAL
Status: DISCONTINUED | OUTPATIENT
Start: 2023-03-08 | End: 2023-03-11 | Stop reason: HOSPADM

## 2023-03-08 RX ORDER — ALBUTEROL SULFATE 2.5 MG/3ML
2.5 SOLUTION RESPIRATORY (INHALATION) EVERY 6 HOURS PRN
Status: DISCONTINUED | OUTPATIENT
Start: 2023-03-08 | End: 2023-03-11 | Stop reason: HOSPADM

## 2023-03-08 RX ORDER — INSULIN LISPRO 100 [IU]/ML
3-14 INJECTION, SOLUTION INTRAVENOUS; SUBCUTANEOUS
Status: DISCONTINUED | OUTPATIENT
Start: 2023-03-08 | End: 2023-03-09

## 2023-03-08 RX ADMIN — IPRATROPIUM BROMIDE AND ALBUTEROL SULFATE 3 ML: .5; 2.5 SOLUTION RESPIRATORY (INHALATION) at 23:23

## 2023-03-08 RX ADMIN — INSULIN LISPRO 3 UNITS: 100 INJECTION, SOLUTION INTRAVENOUS; SUBCUTANEOUS at 18:55

## 2023-03-08 RX ADMIN — ARFORMOTEROL TARTRATE 15 MCG: 15 SOLUTION RESPIRATORY (INHALATION) at 23:23

## 2023-03-08 RX ADMIN — RIVAROXABAN 20 MG: 20 TABLET, FILM COATED ORAL at 21:33

## 2023-03-08 RX ADMIN — IPRATROPIUM BROMIDE AND ALBUTEROL SULFATE 3 ML: .5; 2.5 SOLUTION RESPIRATORY (INHALATION) at 16:21

## 2023-03-08 RX ADMIN — BUDESONIDE 0.5 MG: 0.5 INHALANT ORAL at 23:24

## 2023-03-08 RX ADMIN — DEXAMETHASONE SODIUM PHOSPHATE 6 MG: 10 INJECTION INTRAMUSCULAR; INTRAVENOUS at 18:12

## 2023-03-08 RX ADMIN — SODIUM CHLORIDE 500 ML: 9 INJECTION, SOLUTION INTRAVENOUS at 17:03

## 2023-03-08 RX ADMIN — ROSUVASTATIN 20 MG: 20 TABLET, FILM COATED ORAL at 21:33

## 2023-03-08 RX ADMIN — Medication 10 ML: at 21:33

## 2023-03-08 RX ADMIN — TRAZODONE HYDROCHLORIDE 100 MG: 100 TABLET ORAL at 21:33

## 2023-03-08 NOTE — PROGRESS NOTES
Chief Complaint  Extremity Weakness (Pt states that she is very weak, she has chest congestion that has broke up. She states that she has a deep crackling cough. )    Subjective    Extremity Weakness     :  Hilary Xie a 77-year-old female presents to [facility] accompanied by her daughter-in-law.  The patient resides with her son and his family.  The patient has presented for for complaint of general weakness, chest congestion and cough.  Symptoms started on Saturday with cough (5 days ago) and progressively worsened.  The patient states that her chest is tight and she has a difficult time getting a deep breath.  Cough is productive with yellow sputum.    Review of Systems  Constitutional: Negative for fever and chills.  Positive for loss of appetite.   HEENT: Negative for sore throat, sinus pain and earache.   Cardiovascular: Negative for chest pain and palpitations.  Respiratory: Negative for shortness of air, wheezing and dyspnea.   Gastrointestinal: Negative for  vomiting and diarrhea.  Positive for nausea  Integumentary: Negative for rash.   Musculoskeletal: Negative for myalgia.   Neurologic: Negative for headaches.  Positive for dizziness.    Past Medical History:   Diagnosis Date   • Atrial fibrillation (HCC)    • Hyperlipidemia    • Hypertension         History reviewed. No pertinent surgical history.     Allergies   Allergen Reactions   • Donepezil Nausea Only   • Sulfa Antibiotics Hives          Current Outpatient Medications:   •  Cholecalciferol (Vitamin D3) 50 MCG (2000 UT) capsule, TAKE 1 CAPSULE BY MOUTH DAILY, Disp: 90 capsule, Rfl: 1  •  dilTIAZem CD (CARDIZEM CD) 180 MG 24 hr capsule, TAKE 1 CAPSULE BY MOUTH EVERY DAY, Disp: 90 capsule, Rfl: 1  •  ferrous sulfate (FeroSul) 325 (65 FE) MG tablet, Take 1 tablet by mouth 2 (Two) Times a Day., Disp: 180 tablet, Rfl: 1  •  loratadine (CLARITIN) 10 MG tablet, TAKE 1 TABLET BY MOUTH EVERY DAY, Disp: 30 tablet, Rfl:  "5  •  Namzaric 14-10 MG capsule sustained-release 24 hr, TAKE 1 CAPSULE BY MOUTH EVERY EVENING, Disp: 30 capsule, Rfl: 5  •  OLANZapine (zyPREXA) 5 MG tablet, Take 1 tablet by mouth Daily., Disp: 90 tablet, Rfl: 1  •  potassium chloride (K-DUR,KLOR-CON) 10 MEQ CR tablet, Take 2 tablets by mouth Daily., Disp: 90 tablet, Rfl: 1  •  risedronate (ACTONEL) 150 MG tablet, TAKE 1 TABLET BY MOUTH ONCE EVERY 30 DAYS, Disp: 3 tablet, Rfl: 1  •  rivaroxaban (XARELTO) 20 MG tablet, Take 1 tablet by mouth Daily With Dinner., Disp: 90 tablet, Rfl: 3  •  rosuvastatin (CRESTOR) 20 MG tablet, Take 1 tablet by mouth Daily., Disp: 90 tablet, Rfl: 1  •  sertraline (ZOLOFT) 100 MG tablet, Take 1.5 tablets by mouth Daily., Disp: 135 tablet, Rfl: 1  •  traZODone (DESYREL) 100 MG tablet, Take 1 tablet by mouth Every Night. TAKE 1 & 1/2 TABLETS BY MOUTH EVERY EVENING, MAY INCREASE TO 2 TABLETS IF NEEDED, Disp: 90 tablet, Rfl: 1    Objective   Vital Signs:   /73 (BP Location: Right arm)   Pulse 79   Temp 97.1 °F (36.2 °C) (Temporal)   Resp 18   Ht 157.5 cm (62.01\")   Wt 73.8 kg (162 lb 12.8 oz)   SpO2 96%   BMI 29.77 kg/m²       Physical Exam  Constitutional:       General: She is not in acute distress.     Appearance: She is ill-appearing.   HENT:      Head: Normocephalic and atraumatic.   Cardiovascular:      Rate and Rhythm: Normal rate and regular rhythm.      Heart sounds: Normal heart sounds.   Pulmonary:      Breath sounds: Examination of the right-middle field reveals rhonchi. Examination of the left-middle field reveals rhonchi. Examination of the right-lower field reveals rhonchi. Examination of the left-lower field reveals rhonchi. Rhonchi present. No wheezing or rales.   Lymphadenopathy:      Cervical: No cervical adenopathy.   Skin:     General: Skin is warm and dry.   Neurological:      General: No focal deficit present.      Mental Status: She is alert.   Psychiatric:         Thought Content: Thought content " normal.             Assessment and Plan:  Diagnoses and all orders for this visit:    1. COVID-19 virus infection (Primary)    2. Weakness  -     POCT SARS-CoV-2 Antigen FLORENCIA + Flu      POCT: Positive COVID.  Negative for influenza A and B.  Due to the patient's weakness and dizziness today I advised her to present to the emergency department for further evaluation and treatment.  I explained she may have dehydration, pneumonia, etc.  The patient and her family agree to this plan.      Patient was given instructions and counseling regarding condition.     Follow Up  No follow-ups on file.

## 2023-03-08 NOTE — H&P
[facility]  Date: 3/[facility]/2023   Patient Name: Hilary Xie  : 1945  MRN: [id number]  Primary Care Physician:  Clarence Martínez MD  Date of admission: 3/8/2023    Subjective   Subjective     Chief Complaint: Shortness of breath    HPI:    Hilary Xie is a 77 y.o. female with a past medical history significant for A-fib, hyperlipidemia, hypertension who presents to the hospital with shortness of breath, cough, fatigue, patient was checked for COVID-19, this was positive, chest x-ray was significant for multifocal pneumonia.  Patient was given nebulized breathing treatments, steroids, given patient's hypoxemia to 86% on room air the hospital service was asked to admit for further work-up and management.    Personal History     Past Medical History:  Past Medical History:   Diagnosis Date   • Atrial fibrillation (HCC)    • Hyperlipidemia    • Hypertension        Past Surgical History:  History reviewed. No pertinent surgical history.    Family History:   family history includes Cancer in her father and mother; Diabetes in her mother; Heart failure in her brother.    Social History:    reports that she has never smoked. She has never used smokeless tobacco. She reports that she does not drink alcohol and does not use drugs.    Home Medications:  Memantine HCl-Donepezil HCl, OLANZapine, Vitamin D3, dilTIAZem CD, ferrous sulfate, loratadine, potassium chloride, risedronate, rivaroxaban, rosuvastatin, sertraline, and traZODone    Allergies:  Allergies   Allergen Reactions   • Donepezil Nausea Only   • Sulfa Antibiotics Hives       Review of Systems:  All systems reviewed and negative other than stated in HPI    Objective   Objective     Vitals:   Temp:  [97.1 °F (36.2 °C)-98.4 °F (36.9 °C)] 98.4 °F (36.9 °C)  Heart Rate:  [63-79] 63  Resp:  [16-18] 16  BP: (123-154)/(73-78) 154/78  Flow (L/min):  [2] 2    Physical Exam   GEN: Resting comfortably in bed, no acute distress  HEENT:  Moist mucous membranes  LUNGS: Equal breath rise bilaterally, no accessory muscle use, no audible wheezing    Result Review:  I have personally reviewed the results from the time of this admission to 3/8/2023 17:31 EST and agree with these findings:  [x]  Laboratory  CMP    CMP 11/8/22 11/8/22 2/13/23 3/8/23    1210 1210     Glucose 161 (A)  237 (A) 249 (A)   BUN 13  15 18   Creatinine 1.23 (A)  1.42 (A) 1.42 (A)   eGFR 45.1 (A)  37.9 (A) 37.9 (A)   Sodium 141  140 139   Potassium 4.2  4.1 3.5   Chloride 110 (A)  107 105   Calcium 9.3  9.2 9.2   Total Protein  6.6  6.6   Albumin  3.60  3.7   Globulin    2.9   Total Bilirubin  0.5  0.7   Alkaline Phosphatase  71  71   AST (SGOT)  15  14   ALT (SGPT)  5  11   Albumin/Globulin Ratio    1.3   BUN/Creatinine Ratio 10.6  10.6 12.7   Anion Gap 8.4  10.0 12.2   (A) Abnormal value       Comments are available for some flowsheets but are not being displayed.           CBC    CBC 8/18/22 3/8/23   WBC 8.87 3.52   RBC 4.67 4.68   Hemoglobin 13.5 14.0   Hematocrit 41.1 41.0   MCV 88.0 87.6   MCH 28.9 29.9   MCHC 32.8 34.1   RDW 13.6 13.4   Platelets 280 88 (A)   (A) Abnormal value            []  Microbiology  []  Radiology  []  EKG/Telemetry   []  Cardiology/Vascular   []  Pathology  []  Old records  []  Other:      Assessment & Plan   Assessment / Plan     Assessment:   COVID-19 pneumonia  Acute hypoxemic respiratory failure, increased work of breathing, satting 86% on room air  Atrial fibrillation on anticoagulation  Hyperglycemia  Diabetes  Hyperlipidemia  Hypertension    Plan:  • Patient admitted to the hospital for further work-up and management of above  • Patient started on supplemental oxygen, titrate to 90%  • Start sliding scale insulin  • Start Decadron 6 mg  • Start Brovana, Pulmicort, DuoNebs, as needed albuterol  • Resume home antihypertensives  • Resume home statin  • Continue Xarelto for stroke prevention  • Monitor on flex telemetry  • CBC, CMP reviewed  • Check  CBC, CMP, mag and Phos   • Clinical course will dictate further management    Reviewed patients labs and imaging, and discussed with patient and nurse at bedside, discussed with Dr. Jerez    CODE STATUS:         Admission Status:  I believe this patient meets inpatient status.      Electronically signed by Miguel Angel Gusman MD, 03/08/23, 5:31 PM EST.

## 2023-03-08 NOTE — ED PROVIDER NOTES
Time: 2:24 PM EST  Date of encounter:  3/8/2023  Independent Historian/Clinical History and Information was obtained by:   Patient  Chief Complaint   Patient presents with   • Shortness of Breath     Covid positive today   • Weakness - Generalized   • Nausea       History is limited by: N/A    History of Present Illness:  Patient is a 77 y.o. year old female who presents to the emergency department for evaluation of shortness of breath and cough.  Patient tested positive for COVID today at her PCP office and was recommended to come to the emergency department.  Patient denies any chest pain.  On Saturday, her only symptom was a cough. As time progressed then patient began having nausea and dizziness. The patient voices that she feels weak. She also has decreased appetite.  (Provider in triage, Jacob Hamm PA-C)    Patient Care Team  Primary Care Provider: Clarence Martínez MD    Past Medical History:     Allergies   Allergen Reactions   • Donepezil Nausea Only   • Sulfa Antibiotics Hives     Past Medical History:   Diagnosis Date   • Atrial fibrillation (HCC)    • Hyperlipidemia    • Hypertension      History reviewed. No pertinent surgical history.  Family History   Problem Relation Age of Onset   • Cancer Mother    • Diabetes Mother    • Cancer Father    • Heart failure Brother        Home Medications:  Prior to Admission medications    Medication Sig Start Date End Date Taking? Authorizing Provider   Cholecalciferol (Vitamin D3) 50 MCG (2000 UT) capsule TAKE 1 CAPSULE BY MOUTH DAILY 2/23/23   Clarence Martínez MD   dilTIAZem CD (CARDIZEM CD) 180 MG 24 hr capsule TAKE 1 CAPSULE BY MOUTH EVERY DAY 9/27/22   Clarence Martínez MD   ferrous sulfate (FeroSul) 325 (65 FE) MG tablet Take 1 tablet by mouth 2 (Two) Times a Day. 4/18/22   Clarence Martínez MD   loratadine (CLARITIN) 10 MG tablet TAKE 1 TABLET BY MOUTH EVERY DAY 9/27/22   Clarence Martínez MD   Namzaric 14-10 MG capsule sustained-release 24 hr TAKE 1 CAPSULE BY MOUTH  "EVERY EVENING 9/27/22   Clarence Martínez MD   OLANZapine (zyPREXA) 5 MG tablet Take 1 tablet by mouth Daily. 4/18/22   Clarence Martínez MD   potassium chloride (K-DUR,KLOR-CON) 10 MEQ CR tablet Take 2 tablets by mouth Daily. 2/15/23   Clarence Martínez MD   risedronate (ACTONEL) 150 MG tablet TAKE 1 TABLET BY MOUTH ONCE EVERY 30 DAYS 9/27/22   Clarence Martínez MD   rivaroxaban (XARELTO) 20 MG tablet Take 1 tablet by mouth Daily With Dinner. 5/31/22   Clarence Martínez MD   rosuvastatin (CRESTOR) 20 MG tablet Take 1 tablet by mouth Daily. 2/15/23   Clarence Martínez MD   sertraline (ZOLOFT) 100 MG tablet Take 1.5 tablets by mouth Daily. 2/15/23   Clarence Martínez MD   traZODone (DESYREL) 100 MG tablet Take 1 tablet by mouth Every Night. TAKE 1 & 1/2 TABLETS BY MOUTH EVERY EVENING, MAY INCREASE TO 2 TABLETS IF NEEDED 2/15/23   Clarence Martínez MD        Social History:   Social History     Tobacco Use   • Smoking status: Never   • Smokeless tobacco: Never   Vaping Use   • Vaping Use: Never used   Substance Use Topics   • Alcohol use: Never   • Drug use: Never         Review of Systems:  Review of Systems   Constitutional: Positive for appetite change. Negative for chills and fever.   HENT: Negative for congestion, ear pain and sore throat.    Eyes: Negative for pain.   Respiratory: Positive for cough and shortness of breath. Negative for chest tightness.    Cardiovascular: Negative for chest pain and leg swelling.   Gastrointestinal: Negative for abdominal pain, diarrhea, nausea and vomiting.   Genitourinary: Negative for flank pain and hematuria.   Musculoskeletal: Negative for joint swelling.   Skin: Negative for pallor.   Neurological: Negative for seizures and headaches.   All other systems reviewed and are negative.       Physical Exam:  /78   Pulse 63   Temp 98.4 °F (36.9 °C)   Resp 16   Ht 157.5 cm (62\")   Wt 74 kg (163 lb 2.3 oz)   SpO2 (!) 86%   BMI 29.84 kg/m²     Physical Exam  HENT:      Head: Normocephalic.     "  Mouth/Throat:      Mouth: Mucous membranes are moist.   Eyes:      Pupils: Pupils are equal, round, and reactive to light.   Cardiovascular:      Rate and Rhythm: Normal rate and regular rhythm.      Heart sounds: Normal heart sounds.   Pulmonary:      Effort: Pulmonary effort is normal.      Breath sounds: Normal breath sounds.   Abdominal:      General: There is no distension.   Musculoskeletal:      Cervical back: Neck supple.      Right lower leg: No edema.      Left lower leg: No edema.   Skin:     General: Skin is warm and dry.   Neurological:      General: No focal deficit present.      Mental Status: She is alert and oriented to person, place, and time.   Psychiatric:         Mood and Affect: Mood normal.         Behavior: Behavior normal.                  Procedures:  Procedures      Medical Decision Making:      Comorbidities that affect care:    hyperlipidemia, Atrial Fibrillation, Hypertension    External Notes reviewed:    Previous Clinic Note: Outpatient office visit today where she was referred to the emergency department after being diagnosed with positive COVID-19 test.      The following orders were placed and all results were independently analyzed by me:  Orders Placed This Encounter   Procedures   • XR Chest 1 View   • [place] Draw   • Comprehensive Metabolic Panel   • BNP   • Single High Sensitivity Troponin T   • CBC Auto Differential   • Scan Slide   • Cardiac Monitoring   • Continuous Pulse Oximetry   • Vital Signs   • Inpatient Hospitalist Consult   • Oxygen Therapy- Nasal Cannula; 2 LPM; Titrate for SPO2: equal to or greater than, 92%   • ECG 12 Lead Dyspnea   • Insert Peripheral IV   • Inpatient Admission   • CBC & Differential   • Green Top (Gel)   • Lavender Top   • Gold Top - SST   • Light Blue Top       Medications Given in the Emergency Department:  Medications   sodium chloride 0.9 % flush 10 mL (has no administration in time range)   sodium chloride 0.9 % bolus 500 mL (500 mL  Intravenous New Bag 3/8/23 1703)   dexamethasone sodium phosphate injection 6 mg (has no administration in time range)   ipratropium-albuterol (DUO-NEB) nebulizer solution 3 mL (3 mL Nebulization Given 3/8/23 1621)        ED Course:    The patient was initially evaluated in the triage area where orders were placed. The patient was later dispositioned by Brennan Jerez MD.      The patient was advised to stay for completion of workup which includes but is not limited to communication of labs and radiological results, reassessment and plan. The patient was advised that leaving prior to disposition by a provider could result in critical findings that are not communicated to the patient.     ED Course as of 03/08/23 1731   Wed Mar 08, 2023   1425 PROVIDER IN TRIAGE  Patient was evaluated by me in triage, Jacob Hamm PA-C.  Orders were placed and patient is currently awaiting final results and disposition.  [MD]      ED Course User Index  [MD] Jacob Hamm PA-C       Labs:    Lab Results (last 24 hours)     Procedure Component Value Units Date/Time    POCT SARS-CoV-2 Antigen FLORENCIA + Flu [[id number]]  (Abnormal) Collected: 03/08/23 1322    Specimen: Swab Updated: 03/08/23 1323     SARS Antigen Detected     Influenza A Antigen FLORENCIA Not Detected     Influenza B Antigen FLORENCIA Not Detected     Internal Control Passed     Lot Number [id number]     Expiration Date 01/10/2024    CBC & Differential [[id number]]  (Abnormal) Collected: 03/08/23 1429    Specimen: Blood Updated: 03/08/23 1522    Narrative:      The following orders were created for panel order CBC & Differential.  Procedure                               Abnormality         Status                     ---------                               -----------         ------                     CBC Auto Differential[[id number]]        Abnormal            Final result               Scan Slide[[id number]]                                       Final result                 Please  view results for these tests on the individual orders.    Comprehensive Metabolic Panel [[id number]]  (Abnormal) Collected: 03/08/23 1429    Specimen: Blood Updated: 03/08/23 1514     Glucose 249 mg/dL      BUN 18 mg/dL      Creatinine 1.42 mg/dL      Sodium 139 mmol/L      Potassium 3.5 mmol/L      Chloride 105 mmol/L      CO2 21.8 mmol/L      Calcium 9.2 mg/dL      Total Protein 6.6 g/dL      Albumin 3.7 g/dL      ALT (SGPT) 11 U/L      AST (SGOT) 14 U/L      Alkaline Phosphatase 71 U/L      Total Bilirubin 0.7 mg/dL      Globulin 2.9 gm/dL      A/G Ratio 1.3 g/dL      BUN/Creatinine Ratio 12.7     Anion Gap 12.2 mmol/L      eGFR 37.9 mL/min/1.73     Narrative:      GFR Normal >60  Chronic Kidney Disease <60  Kidney Failure <15    The GFR formula is only valid for adults with stable renal function between ages 18 and 70.    BNP [[id number]]  (Normal) Collected: 03/08/23 1429    Specimen: Blood Updated: 03/08/23 1509     proBNP 566.2 pg/mL     Narrative:      Among patients with dyspnea, NT-proBNP is highly sensitive for the detection of acute congestive heart failure. In addition NT-proBNP of <300 pg/ml effectively rules out acute congestive heart failure with 99% negative predictive value.      Single High Sensitivity Troponin T [[id number]]  (Abnormal) Collected: 03/08/23 1429    Specimen: Blood Updated: 03/08/23 1514     HS Troponin T 14 ng/L     Narrative:      High Sensitive Troponin T Reference Range:  <10.0 ng/L- Negative Female for AMI  <15.0 ng/L- Negative Male for AMI  >=10 - Abnormal Female indicating possible myocardial injury.  >=15 - Abnormal Male indicating possible myocardial injury.   Clinicians would have to utilize clinical acumen, EKG, Troponin, and serial changes to determine if it is an Acute Myocardial Infarction or myocardial injury due to an underlying chronic condition.         CBC Auto Differential [[id number]]  (Abnormal) Collected: 03/08/23 1429    Specimen: Blood Updated: 03/08/23 5708      WBC 3.52 10*3/mm3      RBC 4.68 10*6/mm3      Hemoglobin 14.0 g/dL      Hematocrit 41.0 %      MCV 87.6 fL      MCH 29.9 pg      MCHC 34.1 g/dL      RDW 13.4 %      RDW-SD 43.0 fl      MPV 12.3 fL      Platelets 88 10*3/mm3      Neutrophil % 65.0 %      Lymphocyte % 25.0 %      Monocyte % 8.2 %      Eosinophil % 0.9 %      Basophil % 0.3 %      Immature Grans % 0.6 %      Neutrophils, Absolute 2.29 10*3/mm3      Lymphocytes, Absolute 0.88 10*3/mm3      Monocytes, Absolute 0.29 10*3/mm3      Eosinophils, Absolute 0.03 10*3/mm3      Basophils, Absolute 0.01 10*3/mm3      Immature Grans, Absolute 0.02 10*3/mm3      nRBC 0.0 /100 WBC     Scan Slide [[id number]] Collected: 03/08/23 1429    Specimen: Blood Updated: 03/08/23 1522     RBC Morphology Normal     WBC Morphology Normal     Platelet Estimate Decreased     Giant Platelets Slight/1+           Imaging:    XR Chest 1 View    Result Date: 3/8/2023  PROCEDURE: XR CHEST 1 VW  COMPARISON: None  INDICATIONS: SOA, CHF/COPD  FINDINGS:  The heart is normal in size.  The lungs are well-expanded.  Patchy bilateral airspace disease is consistent with multifocal pneumonia.  Bony structures appear intact.  Metallic anchor in the right humeral head is consistent with rotator cuff surgery.        Patchy bilateral airspace disease consistent with multifocal pneumonia.       HALEIGH DALEY MD       Electronically Signed and Approved By: HALEIGH DALEY MD on 3/08/2023 at 15:18                 Differential Diagnosis and Discussion:      Dyspnea: Differential diagnosis includes but is not limited to metabolic acidosis, neurological disorders, psychogenic, asthma, pneumothorax, upper airway obstruction, COPD, pneumonia, noncardiogenic pulmonary edema, interstitial lung disease, anemia, congestive heart failure, and pulmonary embolism    All labs were reviewed and interpreted by me.  All X-rays were independently reviewed by me.    MDM  Number of Diagnoses or Management  Options  COVID-19  Hypoxia  Diagnosis management comments: In summary this is a 77-year-old female who presents to the emergency department for further evaluation after being diagnosed with COVID-19 today.  Her chest x-ray shows typical bilateral COVID-19 patchy multifocal appearance.  CBC independently reviewed by me and shows no critical abnormalities.  CMP independently reviewed by me and shows no critical abnormalities.  Patient does appear somewhat tired and weak and has dropped her oxygen saturations on room air to the upper 80s.  She is normally not oxygen requiring at home.  She been placed on nasal cannula oxygen given Decadron 6 mg IV and will be admitted to the hospital for further treatment.  Patient case has been discussed with the hospitalist team who will admit to the hospital for further evaluation and continuation of treatment.       Amount and/or Complexity of Data Reviewed  Clinical lab tests: reviewed  Tests in the radiology section of CPT®: reviewed  Tests in the medicine section of CPT®: reviewed             Patient Care Considerations:    CONSULT: I considered consulting Pulmonology, however Patient is stable at this time.      Consultants/Shared Management Plan:    Hospitalist: I have discussed the case with Dr. Sharp who agrees to accept the patient for admission.    Social Determinants of Health:    Patient is independent, reliable, and has access to care.       Disposition and Care Coordination:    Admit:   Through independent evaluation of the patient's history, physical, and imperical data, the patient meets criteria for observation/admission to the hospital.        Final diagnoses:   COVID-19   Hypoxia        ED Disposition     ED Disposition   Decision to Admit    Condition   --    Comment   Level of Care: Remote Telemetry [26]   Diagnosis: COVID-19 [[id number]]   Admitting Physician: BERONICA SHARP [[id number]]   Attending Physician: BERONICA SHARP [[id number]]   Certification: I  Certify That Inpatient Hospital Services Are Medically Necessary For Greater Than 2 Midnights               This medical record created using voice recognition software.           Clement Paulino  03/08/23 1504       Brennan Jerez MD  03/08/23 5827

## 2023-03-09 LAB
ALBUMIN SERPL-MCNC: 3.3 G/DL (ref 3.5–5.2)
ALBUMIN SERPL-MCNC: 3.3 G/DL (ref 3.5–5.2)
ALBUMIN/GLOB SERPL: 1.3 G/DL
ALP SERPL-CCNC: 62 U/L (ref 39–117)
ALP SERPL-CCNC: 62 U/L (ref 39–117)
ALT SERPL W P-5'-P-CCNC: 7 U/L (ref 1–33)
ALT SERPL W P-5'-P-CCNC: 7 U/L (ref 1–33)
ANION GAP SERPL CALCULATED.3IONS-SCNC: 11.8 MMOL/L (ref 5–15)
AST SERPL-CCNC: 13 U/L (ref 1–32)
AST SERPL-CCNC: 13 U/L (ref 1–32)
BASOPHILS # BLD AUTO: 0.01 10*3/MM3 (ref 0–0.2)
BASOPHILS NFR BLD AUTO: 0.8 % (ref 0–1.5)
BILIRUB CONJ SERPL-MCNC: <0.2 MG/DL (ref 0–0.3)
BILIRUB INDIRECT SERPL-MCNC: ABNORMAL MG/DL
BILIRUB SERPL-MCNC: 0.4 MG/DL (ref 0–1.2)
BILIRUB SERPL-MCNC: 0.4 MG/DL (ref 0–1.2)
BUN SERPL-MCNC: 16 MG/DL (ref 8–23)
BUN/CREAT SERPL: 16 (ref 7–25)
BURR CELLS BLD QL SMEAR: NORMAL
CALCIUM SPEC-SCNC: 8.7 MG/DL (ref 8.6–10.5)
CHLORIDE SERPL-SCNC: 108 MMOL/L (ref 98–107)
CO2 SERPL-SCNC: 18.2 MMOL/L (ref 22–29)
CREAT SERPL-MCNC: 1 MG/DL (ref 0.57–1)
CREAT SERPL-MCNC: 1 MG/DL (ref 0.57–1)
DEPRECATED RDW RBC AUTO: 43.5 FL (ref 37–54)
EGFRCR SERPLBLD CKD-EPI 2021: 57.8 ML/MIN/1.73
EGFRCR SERPLBLD CKD-EPI 2021: 57.8 ML/MIN/1.73
ELLIPTOCYTES BLD QL SMEAR: NORMAL
EOSINOPHIL # BLD AUTO: 0 10*3/MM3 (ref 0–0.4)
EOSINOPHIL NFR BLD AUTO: 0 % (ref 0.3–6.2)
ERYTHROCYTE [DISTWIDTH] IN BLOOD BY AUTOMATED COUNT: 13.1 % (ref 12.3–15.4)
GLOBULIN UR ELPH-MCNC: 2.6 GM/DL
GLUCOSE BLDC GLUCOMTR-MCNC: 219 MG/DL (ref 70–99)
GLUCOSE BLDC GLUCOMTR-MCNC: 243 MG/DL (ref 70–99)
GLUCOSE BLDC GLUCOMTR-MCNC: 262 MG/DL (ref 70–99)
GLUCOSE BLDC GLUCOMTR-MCNC: 301 MG/DL (ref 70–99)
GLUCOSE SERPL-MCNC: 317 MG/DL (ref 65–99)
HBA1C MFR BLD: 7.5 % (ref 4.8–5.6)
HCT VFR BLD AUTO: 38.7 % (ref 34–46.6)
HGB BLD-MCNC: 12.7 G/DL (ref 12–15.9)
IMM GRANULOCYTES # BLD AUTO: 0.01 10*3/MM3 (ref 0–0.05)
IMM GRANULOCYTES NFR BLD AUTO: 0.8 % (ref 0–0.5)
LARGE PLATELETS: NORMAL
LYMPHOCYTES # BLD AUTO: 0.26 10*3/MM3 (ref 0.7–3.1)
LYMPHOCYTES NFR BLD AUTO: 21 % (ref 19.6–45.3)
MAGNESIUM SERPL-MCNC: 2 MG/DL (ref 1.6–2.4)
MCH RBC QN AUTO: 29.5 PG (ref 26.6–33)
MCHC RBC AUTO-ENTMCNC: 32.8 G/DL (ref 31.5–35.7)
MCV RBC AUTO: 89.8 FL (ref 79–97)
MONOCYTES # BLD AUTO: 0.05 10*3/MM3 (ref 0.1–0.9)
MONOCYTES NFR BLD AUTO: 4 % (ref 5–12)
NEUTROPHILS NFR BLD AUTO: 0.91 10*3/MM3 (ref 1.7–7)
NEUTROPHILS NFR BLD AUTO: 73.4 % (ref 42.7–76)
NRBC BLD AUTO-RTO: 0 /100 WBC (ref 0–0.2)
PHOSPHATE SERPL-MCNC: 2.5 MG/DL (ref 2.5–4.5)
PLATELET # BLD AUTO: 62 10*3/MM3 (ref 140–450)
PMV BLD AUTO: 13.5 FL (ref 6–12)
POTASSIUM SERPL-SCNC: 3.8 MMOL/L (ref 3.5–5.2)
PROT SERPL-MCNC: 5.9 G/DL (ref 6–8.5)
PROT SERPL-MCNC: 5.9 G/DL (ref 6–8.5)
RBC # BLD AUTO: 4.31 10*6/MM3 (ref 3.77–5.28)
SMALL PLATELETS BLD QL SMEAR: NORMAL
SODIUM SERPL-SCNC: 138 MMOL/L (ref 136–145)
WBC MORPH BLD: NORMAL
WBC NRBC COR # BLD: 1.24 10*3/MM3 (ref 3.4–10.8)

## 2023-03-09 PROCEDURE — 83735 ASSAY OF MAGNESIUM: CPT | Performed by: INTERNAL MEDICINE

## 2023-03-09 PROCEDURE — 25010000002 REMDESIVIR 100 MG RECONSTITUTED SOLUTION: Performed by: INTERNAL MEDICINE

## 2023-03-09 PROCEDURE — 25010000002 DEXAMETHASONE SODIUM PHOSPHATE 10 MG/ML SOLUTION: Performed by: INTERNAL MEDICINE

## 2023-03-09 PROCEDURE — 85007 BL SMEAR W/DIFF WBC COUNT: CPT | Performed by: INTERNAL MEDICINE

## 2023-03-09 PROCEDURE — 82248 BILIRUBIN DIRECT: CPT | Performed by: INTERNAL MEDICINE

## 2023-03-09 PROCEDURE — 94760 N-INVAS EAR/PLS OXIMETRY 1: CPT

## 2023-03-09 PROCEDURE — 84100 ASSAY OF PHOSPHORUS: CPT | Performed by: INTERNAL MEDICINE

## 2023-03-09 PROCEDURE — 99233 SBSQ HOSP IP/OBS HIGH 50: CPT | Performed by: INTERNAL MEDICINE

## 2023-03-09 PROCEDURE — 94799 UNLISTED PULMONARY SVC/PX: CPT

## 2023-03-09 PROCEDURE — 80053 COMPREHEN METABOLIC PANEL: CPT | Performed by: INTERNAL MEDICINE

## 2023-03-09 PROCEDURE — 82962 GLUCOSE BLOOD TEST: CPT

## 2023-03-09 PROCEDURE — 94664 DEMO&/EVAL PT USE INHALER: CPT

## 2023-03-09 PROCEDURE — 63710000001 INSULIN DETEMIR PER 5 UNITS: Performed by: INTERNAL MEDICINE

## 2023-03-09 PROCEDURE — 82565 ASSAY OF CREATININE: CPT | Performed by: INTERNAL MEDICINE

## 2023-03-09 PROCEDURE — 63710000001 INSULIN LISPRO (HUMAN) PER 5 UNITS: Performed by: INTERNAL MEDICINE

## 2023-03-09 PROCEDURE — 85025 COMPLETE CBC W/AUTO DIFF WBC: CPT | Performed by: INTERNAL MEDICINE

## 2023-03-09 PROCEDURE — XW033E5 INTRODUCTION OF REMDESIVIR ANTI-INFECTIVE INTO PERIPHERAL VEIN, PERCUTANEOUS APPROACH, NEW TECHNOLOGY GROUP 5: ICD-10-PCS | Performed by: INTERNAL MEDICINE

## 2023-03-09 RX ORDER — DEXAMETHASONE SODIUM PHOSPHATE 10 MG/ML
6 INJECTION, SOLUTION INTRAMUSCULAR; INTRAVENOUS 2 TIMES DAILY
Status: DISCONTINUED | OUTPATIENT
Start: 2023-03-09 | End: 2023-03-10

## 2023-03-09 RX ORDER — DILTIAZEM HYDROCHLORIDE 180 MG/1
180 CAPSULE, COATED, EXTENDED RELEASE ORAL DAILY
Status: DISCONTINUED | OUTPATIENT
Start: 2023-03-09 | End: 2023-03-11 | Stop reason: HOSPADM

## 2023-03-09 RX ORDER — DEXTROSE MONOHYDRATE 25 G/50ML
25 INJECTION, SOLUTION INTRAVENOUS
Status: DISCONTINUED | OUTPATIENT
Start: 2023-03-09 | End: 2023-03-11 | Stop reason: HOSPADM

## 2023-03-09 RX ORDER — INSULIN LISPRO 100 [IU]/ML
4-24 INJECTION, SOLUTION INTRAVENOUS; SUBCUTANEOUS
Status: DISCONTINUED | OUTPATIENT
Start: 2023-03-09 | End: 2023-03-11 | Stop reason: HOSPADM

## 2023-03-09 RX ORDER — NICOTINE POLACRILEX 4 MG
15 LOZENGE BUCCAL
Status: DISCONTINUED | OUTPATIENT
Start: 2023-03-09 | End: 2023-03-11 | Stop reason: HOSPADM

## 2023-03-09 RX ADMIN — INSULIN LISPRO 10 UNITS: 100 INJECTION, SOLUTION INTRAVENOUS; SUBCUTANEOUS at 09:12

## 2023-03-09 RX ADMIN — SERTRALINE HYDROCHLORIDE 150 MG: 50 TABLET ORAL at 09:14

## 2023-03-09 RX ADMIN — OLANZAPINE 5 MG: 5 TABLET, FILM COATED ORAL at 09:14

## 2023-03-09 RX ADMIN — BUDESONIDE 0.5 MG: 0.5 INHALANT ORAL at 18:22

## 2023-03-09 RX ADMIN — Medication 10 ML: at 21:23

## 2023-03-09 RX ADMIN — BUDESONIDE 0.5 MG: 0.5 INHALANT ORAL at 06:36

## 2023-03-09 RX ADMIN — RIVAROXABAN 20 MG: 20 TABLET, FILM COATED ORAL at 17:54

## 2023-03-09 RX ADMIN — TRAZODONE HYDROCHLORIDE 100 MG: 100 TABLET ORAL at 21:23

## 2023-03-09 RX ADMIN — IPRATROPIUM BROMIDE AND ALBUTEROL SULFATE 3 ML: .5; 2.5 SOLUTION RESPIRATORY (INHALATION) at 06:36

## 2023-03-09 RX ADMIN — INSULIN LISPRO 12 UNITS: 100 INJECTION, SOLUTION INTRAVENOUS; SUBCUTANEOUS at 12:15

## 2023-03-09 RX ADMIN — DEXAMETHASONE SODIUM PHOSPHATE 6 MG: 10 INJECTION INTRAMUSCULAR; INTRAVENOUS at 21:23

## 2023-03-09 RX ADMIN — INSULIN DETEMIR 10 UNITS: 100 INJECTION, SOLUTION SUBCUTANEOUS at 12:54

## 2023-03-09 RX ADMIN — ARFORMOTEROL TARTRATE 15 MCG: 15 SOLUTION RESPIRATORY (INHALATION) at 06:36

## 2023-03-09 RX ADMIN — ROSUVASTATIN 20 MG: 20 TABLET, FILM COATED ORAL at 21:24

## 2023-03-09 RX ADMIN — DEXAMETHASONE SODIUM PHOSPHATE 6 MG: 10 INJECTION INTRAMUSCULAR; INTRAVENOUS at 09:12

## 2023-03-09 RX ADMIN — FAMOTIDINE 40 MG: 20 TABLET ORAL at 09:14

## 2023-03-09 RX ADMIN — IPRATROPIUM BROMIDE AND ALBUTEROL SULFATE 3 ML: .5; 2.5 SOLUTION RESPIRATORY (INHALATION) at 11:12

## 2023-03-09 RX ADMIN — IPRATROPIUM BROMIDE AND ALBUTEROL SULFATE 3 ML: .5; 2.5 SOLUTION RESPIRATORY (INHALATION) at 18:21

## 2023-03-09 RX ADMIN — Medication 10 ML: at 09:15

## 2023-03-09 RX ADMIN — REMDESIVIR 200 MG: 100 INJECTION, POWDER, LYOPHILIZED, FOR SOLUTION INTRAVENOUS at 10:42

## 2023-03-09 RX ADMIN — INSULIN LISPRO 8 UNITS: 100 INJECTION, SOLUTION INTRAVENOUS; SUBCUTANEOUS at 18:02

## 2023-03-09 RX ADMIN — DILTIAZEM HYDROCHLORIDE 180 MG: 180 CAPSULE, COATED, EXTENDED RELEASE ORAL at 09:14

## 2023-03-09 RX ADMIN — MULTIPLE VITAMINS W/ MINERALS TAB 1 TABLET: TAB at 09:14

## 2023-03-09 RX ADMIN — ARFORMOTEROL TARTRATE 15 MCG: 15 SOLUTION RESPIRATORY (INHALATION) at 18:21

## 2023-03-09 NOTE — PLAN OF CARE
Goal Outcome Evaluation: Pt blood glucose range 301-219 this shift. No verbalized complaints. No s/s of distress noted. Continue plan of care.

## 2023-03-09 NOTE — PROGRESS NOTES
[facility]   Hospitalist Progress Note  Date: 3/9/2023  Patient Name: Hilary Xie  : 1945  MRN: [id number]  Date of admission: 3/8/2023    Subjective   Subjective     Chief Complaint:   Shortness of breath, COVID-19 pneumonia    Summary:   Hilary Xie is a 77 y.o. female with a past medical history significant for A-fib, hyperlipidemia, hypertension who presents to the hospital with shortness of breath, cough, fatigue, patient was checked for COVID-19, this was positive, chest x-ray was significant for multifocal pneumonia.  Patient was given nebulized breathing treatments, steroids, given patient's hypoxemia to 86% on room air the hospital service was asked to admit for further work-up and management.    Interval Followup:   No acute events overnight, patient states she is feeling better, patient still requiring supplemental oxygen    Objective   Objective     Vitals:   Temp:  [94.5 °F (34.7 °C)-98.4 °F (36.9 °C)] 94.5 °F (34.7 °C)  Heart Rate:  [53-94] 70  Resp:  [16-20] 18  BP: (116-154)/(46-78) 135/55  Flow (L/min):  [1-2] 1    Physical Exam   GEN: Resting comfortably in bed, no acute distress  HEENT: Moist mucous membranes  LUNGS: Equal breath rise bilaterally, no accessory muscle use, no audible wheezing    Result Review    Result Review:  I have personally reviewed the results as below  [x]  Laboratory  CBC    CBC 8/18/22 3/8/23 3/9/23   WBC 8.87 3.52 1.24 (A)   RBC 4.67 4.68 4.31   Hemoglobin 13.5 14.0 12.7   Hematocrit 41.1 41.0 38.7   MCV 88.0 87.6 89.8   MCH 28.9 29.9 29.5   MCHC 32.8 34.1 32.8   RDW 13.6 13.4 13.1   Platelets 280 88 (A) 62 (A)   (A) Abnormal value            CMP    CMP 2/13/23 3/8/23 3/9/23 3/9/23 3/9/23      0523 0523 0523   Glucose 237 (A) 249 (A) 317 (A)     BUN 15 18 16     Creatinine 1.42 (A) 1.42 (A) 1.00 1.00    eGFR 37.9 (A) 37.9 (A) 57.8 (A) 57.8 (A)    Sodium 140 139 138     Potassium 4.1 3.5 3.8     Chloride 107 105 108 (A)     Calcium 9.2 9.2 8.7     Total  Protein  6.6 5.9 (A)  5.9 (A)   Albumin  3.7 3.3 (A)  3.3 (A)   Globulin  2.9 2.6     Total Bilirubin  0.7 0.4  0.4   Alkaline Phosphatase  71 62  62   AST (SGOT)  14 13  13   ALT (SGPT)  11 7  7   Albumin/Globulin Ratio  1.3 1.3     BUN/Creatinine Ratio 10.6 12.7 16.0     Anion Gap 10.0 12.2 11.8     (A) Abnormal value       Comments are available for some flowsheets but are not being displayed.           []  Microbiology  []  Radiology  []  EKG/Telemetry   []  Cardiology/Vascular   []  Pathology  []  Old records  []  Other:    Assessment & Plan   Assessment / Plan     Assessment:  COVID-19 pneumonia  Acute hypoxemic respiratory failure, increased work of breathing, satting 86% on room air  Atrial fibrillation on anticoagulation  Hyperglycemia  Diabetes  Hyperlipidemia  Hypertension     Plan:  • Patient admitted to the hospital for further work-up and management of above  • Patient started on supplemental oxygen, titrate to 90%  • Increase sliding scale insulin  • Continue Decadron 6 mg twice daily  • Continue Brovana, Pulmicort, DuoNebs, as needed albuterol  • Resume home antihypertensives  • Start remdesivir now that renal function is improved, therapeutic drug monitoring  • Resume home statin  • Continue Xarelto for stroke prevention  • Resume home Cardizem  • Monitor on flex telemetry  • CBC, CMP reviewed  • Check CBC, CMP, mag and Phos in a.m.   • Clinical course will dictate further management    Reviewed patients labs and imaging, and discussed with patient and nurse at bedside.    DVT prophylaxis:  Medical and mechanical DVT prophylaxis orders are present.    CODE STATUS:   Code Status (Patient has no pulse and is not breathing): CPR (Attempt to Resuscitate)  Medical Interventions (Patient has pulse or is breathing): Full Support  Release to patient: Routine Release        Electronically signed by Miguel Angel Gusman MD, 03/09/23, 10:28 AM EST.

## 2023-03-09 NOTE — PLAN OF CARE
Goal Outcome Evaluation:      VSS. New admit from ED this shift. Diabetes education consult placed for further information on how to manage diet. Rested comfortably most of night. Will continue to monitor.

## 2023-03-10 LAB
ALBUMIN SERPL-MCNC: 3.1 G/DL (ref 3.5–5.2)
ALBUMIN/GLOB SERPL: 1.1 G/DL
ALP SERPL-CCNC: 59 U/L (ref 39–117)
ALT SERPL W P-5'-P-CCNC: 7 U/L (ref 1–33)
ANION GAP SERPL CALCULATED.3IONS-SCNC: 13 MMOL/L (ref 5–15)
AST SERPL-CCNC: 13 U/L (ref 1–32)
BASOPHILS # BLD AUTO: 0 10*3/MM3 (ref 0–0.2)
BASOPHILS NFR BLD AUTO: 0 % (ref 0–1.5)
BILIRUB CONJ SERPL-MCNC: <0.2 MG/DL (ref 0–0.3)
BILIRUB SERPL-MCNC: 0.3 MG/DL (ref 0–1.2)
BUN SERPL-MCNC: 19 MG/DL (ref 8–23)
BUN/CREAT SERPL: 15.8 (ref 7–25)
CALCIUM SPEC-SCNC: 8.7 MG/DL (ref 8.6–10.5)
CHLORIDE SERPL-SCNC: 106 MMOL/L (ref 98–107)
CO2 SERPL-SCNC: 18 MMOL/L (ref 22–29)
CREAT SERPL-MCNC: 1.2 MG/DL (ref 0.57–1)
DEPRECATED RDW RBC AUTO: 43.2 FL (ref 37–54)
EGFRCR SERPLBLD CKD-EPI 2021: 46.4 ML/MIN/1.73
EOSINOPHIL # BLD AUTO: 0 10*3/MM3 (ref 0–0.4)
EOSINOPHIL NFR BLD AUTO: 0 % (ref 0.3–6.2)
ERYTHROCYTE [DISTWIDTH] IN BLOOD BY AUTOMATED COUNT: 13.2 % (ref 12.3–15.4)
GLOBULIN UR ELPH-MCNC: 2.7 GM/DL
GLUCOSE BLDC GLUCOMTR-MCNC: 169 MG/DL (ref 70–99)
GLUCOSE BLDC GLUCOMTR-MCNC: 252 MG/DL (ref 70–99)
GLUCOSE BLDC GLUCOMTR-MCNC: 280 MG/DL (ref 70–99)
GLUCOSE BLDC GLUCOMTR-MCNC: 339 MG/DL (ref 70–99)
GLUCOSE SERPL-MCNC: 364 MG/DL (ref 65–99)
HCT VFR BLD AUTO: 36.2 % (ref 34–46.6)
HGB BLD-MCNC: 12 G/DL (ref 12–15.9)
IMM GRANULOCYTES # BLD AUTO: 0.01 10*3/MM3 (ref 0–0.05)
IMM GRANULOCYTES NFR BLD AUTO: 0.4 % (ref 0–0.5)
LYMPHOCYTES # BLD AUTO: 0.25 10*3/MM3 (ref 0.7–3.1)
LYMPHOCYTES NFR BLD AUTO: 9.2 % (ref 19.6–45.3)
MAGNESIUM SERPL-MCNC: 1.9 MG/DL (ref 1.6–2.4)
MCH RBC QN AUTO: 29.4 PG (ref 26.6–33)
MCHC RBC AUTO-ENTMCNC: 33.1 G/DL (ref 31.5–35.7)
MCV RBC AUTO: 88.7 FL (ref 79–97)
MONOCYTES # BLD AUTO: 0.09 10*3/MM3 (ref 0.1–0.9)
MONOCYTES NFR BLD AUTO: 3.3 % (ref 5–12)
NEUTROPHILS NFR BLD AUTO: 2.37 10*3/MM3 (ref 1.7–7)
NEUTROPHILS NFR BLD AUTO: 87.1 % (ref 42.7–76)
NRBC BLD AUTO-RTO: 0 /100 WBC (ref 0–0.2)
PHOSPHATE SERPL-MCNC: 2.2 MG/DL (ref 2.5–4.5)
PLATELET # BLD AUTO: 70 10*3/MM3 (ref 140–450)
PMV BLD AUTO: 13.8 FL (ref 6–12)
POTASSIUM SERPL-SCNC: 3.3 MMOL/L (ref 3.5–5.2)
PROT SERPL-MCNC: 5.8 G/DL (ref 6–8.5)
RBC # BLD AUTO: 4.08 10*6/MM3 (ref 3.77–5.28)
SODIUM SERPL-SCNC: 137 MMOL/L (ref 136–145)
WBC NRBC COR # BLD: 2.72 10*3/MM3 (ref 3.4–10.8)

## 2023-03-10 PROCEDURE — 25010000002 REMDESIVIR 100 MG RECONSTITUTED SOLUTION: Performed by: INTERNAL MEDICINE

## 2023-03-10 PROCEDURE — 63710000001 INSULIN LISPRO (HUMAN) PER 5 UNITS: Performed by: INTERNAL MEDICINE

## 2023-03-10 PROCEDURE — 85025 COMPLETE CBC W/AUTO DIFF WBC: CPT | Performed by: INTERNAL MEDICINE

## 2023-03-10 PROCEDURE — 63710000001 INSULIN DETEMIR PER 5 UNITS: Performed by: INTERNAL MEDICINE

## 2023-03-10 PROCEDURE — 94760 N-INVAS EAR/PLS OXIMETRY 1: CPT

## 2023-03-10 PROCEDURE — 94799 UNLISTED PULMONARY SVC/PX: CPT

## 2023-03-10 PROCEDURE — 97165 OT EVAL LOW COMPLEX 30 MIN: CPT

## 2023-03-10 PROCEDURE — 83735 ASSAY OF MAGNESIUM: CPT | Performed by: INTERNAL MEDICINE

## 2023-03-10 PROCEDURE — 99233 SBSQ HOSP IP/OBS HIGH 50: CPT | Performed by: INTERNAL MEDICINE

## 2023-03-10 PROCEDURE — 84100 ASSAY OF PHOSPHORUS: CPT | Performed by: INTERNAL MEDICINE

## 2023-03-10 PROCEDURE — 25010000002 DEXAMETHASONE SODIUM PHOSPHATE 10 MG/ML SOLUTION: Performed by: INTERNAL MEDICINE

## 2023-03-10 PROCEDURE — 82248 BILIRUBIN DIRECT: CPT | Performed by: INTERNAL MEDICINE

## 2023-03-10 PROCEDURE — 80053 COMPREHEN METABOLIC PANEL: CPT | Performed by: INTERNAL MEDICINE

## 2023-03-10 PROCEDURE — 82962 GLUCOSE BLOOD TEST: CPT

## 2023-03-10 PROCEDURE — 97161 PT EVAL LOW COMPLEX 20 MIN: CPT

## 2023-03-10 RX ORDER — POTASSIUM CHLORIDE 750 MG/1
40 CAPSULE, EXTENDED RELEASE ORAL ONCE
Status: COMPLETED | OUTPATIENT
Start: 2023-03-10 | End: 2023-03-10

## 2023-03-10 RX ORDER — DEXAMETHASONE SODIUM PHOSPHATE 10 MG/ML
6 INJECTION, SOLUTION INTRAMUSCULAR; INTRAVENOUS DAILY
Status: DISCONTINUED | OUTPATIENT
Start: 2023-03-11 | End: 2023-03-11

## 2023-03-10 RX ADMIN — SERTRALINE HYDROCHLORIDE 150 MG: 50 TABLET ORAL at 08:41

## 2023-03-10 RX ADMIN — IPRATROPIUM BROMIDE AND ALBUTEROL SULFATE 3 ML: .5; 2.5 SOLUTION RESPIRATORY (INHALATION) at 00:07

## 2023-03-10 RX ADMIN — IPRATROPIUM BROMIDE AND ALBUTEROL SULFATE 3 ML: .5; 2.5 SOLUTION RESPIRATORY (INHALATION) at 07:20

## 2023-03-10 RX ADMIN — MULTIPLE VITAMINS W/ MINERALS TAB 1 TABLET: TAB at 08:41

## 2023-03-10 RX ADMIN — RIVAROXABAN 20 MG: 20 TABLET, FILM COATED ORAL at 17:33

## 2023-03-10 RX ADMIN — Medication 10 ML: at 08:41

## 2023-03-10 RX ADMIN — BUDESONIDE 0.5 MG: 0.5 INHALANT ORAL at 07:20

## 2023-03-10 RX ADMIN — INSULIN LISPRO 4 UNITS: 100 INJECTION, SOLUTION INTRAVENOUS; SUBCUTANEOUS at 17:33

## 2023-03-10 RX ADMIN — DEXAMETHASONE SODIUM PHOSPHATE 6 MG: 10 INJECTION INTRAMUSCULAR; INTRAVENOUS at 11:56

## 2023-03-10 RX ADMIN — ROSUVASTATIN 20 MG: 20 TABLET, FILM COATED ORAL at 21:20

## 2023-03-10 RX ADMIN — INSULIN DETEMIR 10 UNITS: 100 INJECTION, SOLUTION SUBCUTANEOUS at 08:42

## 2023-03-10 RX ADMIN — SENNOSIDES AND DOCUSATE SODIUM 2 TABLET: 8.6; 5 TABLET ORAL at 08:41

## 2023-03-10 RX ADMIN — ARFORMOTEROL TARTRATE 15 MCG: 15 SOLUTION RESPIRATORY (INHALATION) at 07:20

## 2023-03-10 RX ADMIN — DILTIAZEM HYDROCHLORIDE 180 MG: 180 CAPSULE, COATED, EXTENDED RELEASE ORAL at 08:41

## 2023-03-10 RX ADMIN — SENNOSIDES AND DOCUSATE SODIUM 2 TABLET: 8.6; 5 TABLET ORAL at 21:20

## 2023-03-10 RX ADMIN — IPRATROPIUM BROMIDE AND ALBUTEROL SULFATE 3 ML: .5; 2.5 SOLUTION RESPIRATORY (INHALATION) at 11:51

## 2023-03-10 RX ADMIN — REMDESIVIR 100 MG: 100 INJECTION, POWDER, LYOPHILIZED, FOR SOLUTION INTRAVENOUS at 13:36

## 2023-03-10 RX ADMIN — INSULIN LISPRO 16 UNITS: 100 INJECTION, SOLUTION INTRAVENOUS; SUBCUTANEOUS at 12:29

## 2023-03-10 RX ADMIN — FAMOTIDINE 40 MG: 20 TABLET ORAL at 08:41

## 2023-03-10 RX ADMIN — Medication 10 ML: at 21:20

## 2023-03-10 RX ADMIN — TRAZODONE HYDROCHLORIDE 100 MG: 100 TABLET ORAL at 21:20

## 2023-03-10 RX ADMIN — OLANZAPINE 5 MG: 5 TABLET, FILM COATED ORAL at 08:41

## 2023-03-10 RX ADMIN — Medication 5 MG: at 00:34

## 2023-03-10 RX ADMIN — POTASSIUM CHLORIDE 40 MEQ: 10 CAPSULE, COATED, EXTENDED RELEASE ORAL at 08:41

## 2023-03-10 RX ADMIN — INSULIN LISPRO 12 UNITS: 100 INJECTION, SOLUTION INTRAVENOUS; SUBCUTANEOUS at 08:42

## 2023-03-10 RX ADMIN — INSULIN DETEMIR 15 UNITS: 100 INJECTION, SOLUTION SUBCUTANEOUS at 21:19

## 2023-03-10 NOTE — PLAN OF CARE
Goal Outcome Evaluation:  Plan of Care Reviewed With: patient        Progress: no change  Outcome Evaluation: VSS. Patient rested off and on this shift, having difficulty sleeping with administration of steroids. No issues or concerns this shift. Will continue plan of care.

## 2023-03-10 NOTE — THERAPY EVALUATION
Patient Name: Hilary Xie  : 1945    MRN: [id number]                              Today's Date: 3/10/2023       Admit Date: 3/8/2023    Visit Dx:     ICD-10-CM ICD-9-CM   1. COVID-19  U07.1 079.89   2. Hypoxia  R09.02 799.02   3. Decreased activities of daily living (ADL)  Z78.9 V49.89     Patient Active Problem List   Diagnosis   • Longstanding persistent atrial fibrillation (HCC)   • Primary hypertension   • Recurrent major depression in partial remission (HCC)   • Type 2 diabetes mellitus without complication, without long-term current use of insulin (HCC)   • Mixed hyperlipidemia   • Iron deficiency anemia secondary to inadequate dietary iron intake   • Vitamin D deficiency   • Age-related osteoporosis without current pathological fracture   • Abnormal magnetic resonance imaging study   • Shuffling gait   • Primary osteoarthritis involving multiple joints   • Postmenopausal bone loss   • Major neurocognitive disorder (HCC)   • MARY on CPAP   • Stage 3b chronic kidney disease (HCC)   • Subclinical hypothyroidism   • COVID-19     Past Medical History:   Diagnosis Date   • Atrial fibrillation (HCC)    • Hyperlipidemia    • Hypertension      Past Surgical History:   Procedure Laterality Date   • SPINE SURGERY        General Information     Row Name 03/10/23 0959          OT Time and Intention    Document Type evaluation  -PG     Mode of Treatment individual therapy;occupational therapy  -PG     Row Name 03/10/23 0959          General Information    Patient Profile Reviewed yes  -PG     Prior Level of Function independent:;transfer;ADL's  -PG     Existing Precautions/Restrictions oxygen therapy device and L/min  -PG     Barriers to Rehab none identified  -PG     Row Name 03/10/23 0959          Occupational Profile    Reason for Services/Referral (Occupational Profile) Patient is a pleasant 77-year-old female admitted for hypoxia and generalized weakness secondary to COVID-pneumonia.  No previous OT services  identified.  Patient being evaluated by Occupational Therapy due to recent decline in ADL function  -PG     Row Name 03/10/23 0959          Cognition    Orientation Status (Cognition) oriented x 4  -PG           User Key  (r) = Recorded By, (t) = Taken By, (c) = Cosigned By    Initials Name Provider Type    PG Luis M Curtis OT Occupational Therapist                 Mobility/ADL's     Row Name 03/10/23 1000          Transfers    Transfers sit-stand transfer;stand-sit transfer  -PG     Row Name 03/10/23 1000          Sit-Stand Transfer    Sit-Stand [place] (Transfers) independent  -PG     Row Name 03/10/23 1000          Stand-Sit Transfer    Stand-Sit [place] (Transfers) independent  -PG     Row Name 03/10/23 1000          Activities of Daily Living    BADL Assessment/Intervention bathing;upper body dressing;lower body dressing;grooming;toileting  -PG     Row Name 03/10/23 1000          Bathing Assessment/Intervention    [place] Level (Bathing) bathing skills;independent  -PG     Row Name 03/10/23 1000          Upper Body Dressing Assessment/Training    [place] Level (Upper Body Dressing) upper body dressing skills;independent  -PG     Row Name 03/10/23 1000          Lower Body Dressing Assessment/Training    [place] Level (Lower Body Dressing) lower body dressing skills;independent  -PG     Row Name 03/10/23 1000          Grooming Assessment/Training    [place] Level (Grooming) grooming skills;independent  -PG     Row Name 03/10/23 1000          Toileting Assessment/Training    [place] Level (Toileting) toileting skills;independent  -PG           User Key  (r) = Recorded By, (t) = Taken By, (c) = Cosigned By    Initials Name Provider Type    Luis M Enriquez OT Occupational Therapist               Obj/Interventions     Row Name 03/10/23 1001          Sensory Assessment (Somatosensory)    Sensory Assessment (Somatosensory) sensation intact  -PG     Row Name 03/10/23 1001           Vision Assessment/Intervention    Visual Impairment/Limitations WFL  -PG     Row Name 03/10/23 1001          Range of Motion Comprehensive    General Range of Motion no range of motion deficits identified  -PG     Row Name 03/10/23 1001          Strength Comprehensive (MMT)    General Manual Muscle Testing (MMT) Assessment no strength deficits identified  -PG     Row Name 03/10/23 1001          Motor Skills    Motor Skills coordination;functional endurance  -PG     Coordination WFL  -PG     Functional Endurance Fair plus  -PG           User Key  (r) = Recorded By, (t) = Taken By, (c) = Cosigned By    Initials Name Provider Type    PG Luis M Curtis, OT Occupational Therapist               Goals/Plan    No documentation.                Clinical Impression     Row Name 03/10/23 1001          Pain Assessment    Pretreatment Pain Rating 0/10 - no pain  -PG     Posttreatment Pain Rating 0/10 - no pain  -PG     [facility] Name 03/10/23 1001          Plan of Care Review    Plan of Care Reviewed With patient  -PG     Progress improving  -PG     Outcome Evaluation No additional OT services necessary at this time as patient is independent with all self-care and transfers.  Patient walked to and from the bathroom and remained off oxygen for several minutes during evaluation.  O2 sats were 95%.  -PG     Row Name 03/10/23 1001          Therapy Assessment/Plan (OT)    Criteria for Skilled Therapeutic Interventions Met (OT) no;no problems identified which require skilled intervention  -PG     Therapy Frequency (OT) evaluation only  -PG     Row Name 03/10/23 1001          Therapy Plan Review/Discharge Plan (OT)    Anticipated Discharge Disposition (OT) home  -PG           User Key  (r) = Recorded By, (t) = Taken By, (c) = Cosigned By    Initials Name Provider Type    PG Luis M Curtis, OT Occupational Therapist               Outcome Measures     Row Name 03/10/23 1003          How much help from another is currently needed...    Putting on  and taking off regular lower body clothing? 4  -PG     Bathing (including washing, rinsing, and drying) 4  -PG     Toileting (which includes using toilet bed pan or urinal) 4  -PG     Putting on and taking off regular upper body clothing 4  -PG     Taking care of personal grooming (such as brushing teeth) 4  -PG     Eating meals 4  -PG     AM-PAC 6 Clicks Score (OT) 24  -PG     Row Name 03/10/23 1003          Functional Assessment    Outcome Measure Options AM-PAC 6 Clicks Daily Activity (OT);Optimal Instrument  -PG     Row Name 03/10/23 1003          Optimal Instrument    Optimal Instrument Optimal - 3  -PG     Bending/Stooping 1  -PG     Standing 1  -PG     Reaching 1  -PG     From the list, choose the 3 activities you would most like to be able to do without any difficulty Bending/stooping;Standing;Reaching  -PG     Total Score Optimal - 3 3  -PG           User Key  (r) = Recorded By, (t) = Taken By, (c) = Cosigned By    Initials Name Provider Type    PG Luis M Curtis OT Occupational Therapist                Occupational Therapy Education     Title: PT OT SLP Therapies (Not Started)     Topic: Occupational Therapy (Not Started)     Point: ADL training (Not Started)     Description:   Instruct learner(s) on proper safety adaptation and remediation techniques during self care or transfers.   Instruct in proper use of assistive devices.              Learner Progress:  Not documented in this visit.          Point: Home exercise program (Not Started)     Description:   Instruct learner(s) on appropriate technique for monitoring, assisting and/or progressing therapeutic exercises/activities.              Learner Progress:  Not documented in this visit.          Point: Precautions (Not Started)     Description:   Instruct learner(s) on prescribed precautions during self-care and functional transfers.              Learner Progress:  Not documented in this visit.          Point: Body mechanics (Not Started)      Description:   Instruct learner(s) on proper positioning and spine alignment during self-care, functional mobility activities and/or exercises.              Learner Progress:  Not documented in this visit.                          OT Recommendation and Plan  Therapy Frequency (OT): evaluation only  Plan of Care Review  Plan of Care Reviewed With: patient  Progress: improving  Outcome Evaluation: No additional OT services necessary at this time as patient is independent with all self-care and transfers.  Patient walked to and from the bathroom and remained off oxygen for several minutes during evaluation.  O2 sats were 95%.     Time Calculation:    Time Calculation- OT     Row Name 03/10/23 1005             Time Calculation- OT    OT Received On 03/10/23  -PG         Untimed Charges    OT Eval/Re-eval Minutes 35  -PG         Total Minutes    Untimed Charges Total Minutes 35  -PG       Total Minutes 35  -PG            User Key  (r) = Recorded By, (t) = Taken By, (c) = Cosigned By    Initials Name Provider Type    PG Luis M Curtis OT Occupational Therapist              Therapy Charges for Today     Code Description Service Date Service Provider Modifiers Qty    88328812612 HC OT EVAL LOW COMPLEXITY 3 3/10/2023 Luis M Curtis OT GO 1               Luis M Curtis OT  3/10/2023

## 2023-03-10 NOTE — THERAPY EVALUATION
Acute Care - Physical Therapy Initial Evaluation   [facility]     Patient Name: Hilary Xie  : 1945  MRN: [id number]  Today's Date: 3/10/2023      Visit Dx:     ICD-10-CM ICD-9-CM   1. COVID-19  U07.1 079.89   2. Hypoxia  R09.02 799.02   3. Decreased activities of daily living (ADL)  Z78.9 V49.89   4. Difficulty walking  R26.2 719.7     Patient Active Problem List   Diagnosis   • Longstanding persistent atrial fibrillation (HCC)   • Primary hypertension   • Recurrent major depression in partial remission (HCC)   • Type 2 diabetes mellitus without complication, without long-term current use of insulin (HCC)   • Mixed hyperlipidemia   • Iron deficiency anemia secondary to inadequate dietary iron intake   • Vitamin D deficiency   • Age-related osteoporosis without current pathological fracture   • Abnormal magnetic resonance imaging study   • Shuffling gait   • Primary osteoarthritis involving multiple joints   • Postmenopausal bone loss   • Major neurocognitive disorder (HCC)   • MARY on CPAP   • Stage 3b chronic kidney disease (HCC)   • Subclinical hypothyroidism   • COVID-19     Past Medical History:   Diagnosis Date   • Atrial fibrillation (HCC)    • Hyperlipidemia    • Hypertension      Past Surgical History:   Procedure Laterality Date   • SPINE SURGERY       PT Assessment (last 12 hours)     PT Evaluation and Treatment     Row Name 03/10/23 1300          Physical Therapy Time and Intention    Subjective Information no complaints  -AV     Document Type evaluation  -AV     Mode of Treatment individual therapy;physical therapy  -AV     Row Name 03/10/23 1300          General Information    Patient Profile Reviewed yes  -AV     Patient Observations alert;cooperative;agree to therapy  -AV     Prior Level of Function independent:;all household mobility;gait;transfer;ADL's  Ambulated without an assistive device. No home O2.  -AV     Equipment Currently Used at Home none  -AV     Existing Precautions/Restrictions  no known precautions/restrictions  -AV     Row Name 03/10/23 1300          Living Environment    Current Living Arrangements home  -AV     Home Accessibility stairs to enter home;stairs within home  -AV     People in Home child(johann), adult  Son and his family  -AV     Row Name 03/10/23 1300          Home Main Entrance    Number of Stairs, Main Entrance two  -AV     Row Name 03/10/23 1300          Stairs Within Home, Primary    Stairs, Within Home, Primary Flight to second level of home  -AV     Row Name 03/10/23 1300          Cognition    Orientation Status (Cognition) oriented x 3  -AV     Row Name 03/10/23 1300          Range of Motion (ROM)    Range of Motion bilateral lower extremities;ROM is WFL  -AV     Row Name 03/10/23 1300          Strength (Manual Muscle Testing)    Strength (Manual Muscle Testing) bilateral lower extremities;strength is WFL  -AV     Row Name 03/10/23 1300          Bed Mobility    Bed Mobility bed mobility (all) activities  -AV     All Activities, [place] (Bed Mobility) independent  -AV     Row Name 03/10/23 1300          Transfers    Transfers sit-stand transfer;stand-sit transfer  -AV     Row Name 03/10/23 1300          Sit-Stand Transfer    Sit-Stand [place] (Transfers) independent  -AV     Assistive Device (Sit-Stand Transfers) --  No AD  -AV     Row Name 03/10/23 1300          Stand-Sit Transfer    Stand-Sit [place] (Transfers) independent  -AV     Assistive Device (Stand-Sit Transfers) --  No AD  -AV     Row Name 03/10/23 1300          Gait/Stairs (Locomotion)    Gait/Stairs Locomotion gait/ambulation independence;gait/ambulation assistive device;distance ambulated  -AV     [place] Level (Gait) independent  -AV     Assistive Device (Gait) --  No AD  -AV     Distance in Feet (Gait) 150  Patient reports being up ad steve in room prior to therapist arrival  -AV     Pattern (Gait) step-through  -AV     Row Name 03/10/23 1300          Balance    Balance Assessment  standing dynamic balance  -AV     Dynamic Standing Balance independent  -AV     Position/Device Used, Standing Balance unsupported  -AV     Row Name 03/10/23 1300          Plan of Care Review    Plan of Care Reviewed With patient  -AV     Progress no change  -AV     Outcome Evaluation Patient not appropriate for skilled PT services as she has not had a signficant decline in functional mobility. Patient educated on continued ambulation in her room throughout the day until her discharge from the hospital. Patient safe to return home once medically able. D/C PT order at this time with patient in agreement.  -AV     Row Name 03/10/23 1300          Therapy Assessment/Plan (PT)    Criteria for Skilled Interventions Met (PT) no problems identified which require skilled intervention  -AV     Therapy Frequency (PT) evaluation only  -AV     Row Name 03/10/23 1300          PT Evaluation Complexity    History, PT Evaluation Complexity no personal factors and/or comorbidities  -AV     Examination of Body Systems (PT Eval Complexity) total of 4 or more elements  -AV     Clinical Presentation (PT Evaluation Complexity) stable  -AV     Clinical Decision Making (PT Evaluation Complexity) low complexity  -AV     Overall Complexity (PT Evaluation Complexity) low complexity  -AV     Row Name 03/10/23 1300          Therapy Plan Review/Discharge Plan (PT)    Therapy Plan Review (PT) evaluation/treatment results reviewed;participants voiced agreement with care plan;patient  -AV           User Key  (r) = Recorded By, (t) = Taken By, (c) = Cosigned By    Initials Name Provider Type    Wilver Mcpherson, PT Physical Therapist                Physical Therapy Education     Title: PT OT SLP Therapies (In Progress)     Topic: Physical Therapy (In Progress)     Point: Mobility training (Done)     Learning Progress Summary           Patient Acceptance, E,TB, VU by AV at 3/10/2023 1311                   Point: Home exercise program (Not Started)      Learner Progress:  Not documented in this visit.          Point: Body mechanics (Done)     Learning Progress Summary           Patient Acceptance, E,TB, VU by AV at 3/10/2023 1311                   Point: Precautions (Done)     Learning Progress Summary           Patient Acceptance, E,TB, VU by AV at 3/10/2023 1311                               User Key     Initials Effective Dates Name Provider Type Discipline    AV 06/11/21 -  Wilver Worrell, PT Physical Therapist PT              PT Recommendation and Plan  Anticipated Discharge Disposition (PT): home  Therapy Frequency (PT): evaluation only  Plan of Care Reviewed With: patient  Progress: no change  Outcome Evaluation: Patient not appropriate for skilled PT services as she has not had a signficant decline in functional mobility. Patient educated on continued ambulation in her room throughout the day until her discharge from the hospital. Patient safe to return home once medically able. D/C PT order at this time with patient in agreement.   Outcome Measures     Row Name 03/10/23 1300             How much help from another person do you currently need...    Turning from your back to your side while in flat bed without using bedrails? 4  -AV      Moving from lying on back to sitting on the side of a flat bed without bedrails? 4  -AV      Moving to and from a bed to a chair (including a wheelchair)? 4  -AV      Standing up from a chair using your arms (e.g., wheelchair, bedside chair)? 4  -AV      Climbing 3-5 steps with a railing? 4  -AV      To walk in hospital room? 4  -AV      AM-PAC 6 Clicks Score (PT) 24  -AV         Functional Assessment    Outcome Measure Options AM-PAC 6 Clicks Basic Mobility (PT)  -AV            User Key  (r) = Recorded By, (t) = Taken By, (c) = Cosigned By    Initials Name Provider Type    AV Wilver Worrell, PT Physical Therapist                 Time Calculation:    PT Charges     Row Name 03/10/23 1311             Time  Calculation    PT Received On 03/10/23  -AV         Untimed Charges    PT Eval/Re-eval Minutes 32  -AV         Total Minutes    Untimed Charges Total Minutes 32  -AV       Total Minutes 32  -AV            User Key  (r) = Recorded By, (t) = Taken By, (c) = Cosigned By    Initials Name Provider Type    AV Wilver Worrell, PT Physical Therapist              Therapy Charges for Today     Code Description Service Date Service Provider Modifiers Qty    20119627541 HC PT EVAL LOW COMPLEXITY 3 3/10/2023 Wilver Worrell, PT GP 1          PT G-Codes  Outcome Measure Options: AM-PAC 6 Clicks Basic Mobility (PT)  AM-PAC 6 Clicks Score (PT): 24  AM-PAC 6 Clicks Score (OT): 24    Wilver Worrell PT  3/10/2023

## 2023-03-10 NOTE — PLAN OF CARE
Goal Outcome Evaluation:         Pt alert and orient x4.  VS WNL for pt.  Pt pain managed with prn medication.  Blood sugar range 169-339.  Insulin adm as ordered.

## 2023-03-10 NOTE — PLAN OF CARE
Goal Outcome Evaluation:  Plan of Care Reviewed With: patient        Progress: improving  Outcome Evaluation: No additional OT services necessary at this time as patient is independent with all self-care and transfers.  Patient walked to and from the bathroom and remained off oxygen for several minutes during evaluation.  O2 sats were 95%.

## 2023-03-10 NOTE — PLAN OF CARE
Goal Outcome Evaluation:  Plan of Care Reviewed With: patient        Progress: no change  Outcome Evaluation: Patient not appropriate for skilled PT services as she has not had a signficant decline in functional mobility. Patient educated on continued ambulation in her room throughout the day until her discharge from the hospital. Patient safe to return home once medically able. D/C PT order at this time with patient in agreement.

## 2023-03-11 ENCOUNTER — READMISSION MANAGEMENT (OUTPATIENT)
Dept: CALL CENTER | Facility: HOSPITAL | Age: 78
End: 2023-03-11
Payer: MEDICARE

## 2023-03-11 VITALS
DIASTOLIC BLOOD PRESSURE: 46 MMHG | HEART RATE: 75 BPM | HEIGHT: 62 IN | SYSTOLIC BLOOD PRESSURE: 137 MMHG | TEMPERATURE: 98.2 F | RESPIRATION RATE: 18 BRPM | BODY MASS INDEX: 30.51 KG/M2 | WEIGHT: 165.79 LBS | OXYGEN SATURATION: 93 %

## 2023-03-11 PROBLEM — D89.832 CYTOKINE RELEASE SYNDROME, GRADE 2: Status: ACTIVE | Noted: 2023-03-11

## 2023-03-11 LAB
ALBUMIN SERPL-MCNC: 3.1 G/DL (ref 3.5–5.2)
ALBUMIN/GLOB SERPL: 1.3 G/DL
ALP SERPL-CCNC: 57 U/L (ref 39–117)
ALT SERPL W P-5'-P-CCNC: 11 U/L (ref 1–33)
ANION GAP SERPL CALCULATED.3IONS-SCNC: 8.3 MMOL/L (ref 5–15)
AST SERPL-CCNC: 14 U/L (ref 1–32)
BASOPHILS # BLD AUTO: 0 10*3/MM3 (ref 0–0.2)
BASOPHILS NFR BLD AUTO: 0 % (ref 0–1.5)
BILIRUB CONJ SERPL-MCNC: <0.2 MG/DL (ref 0–0.3)
BILIRUB SERPL-MCNC: 0.3 MG/DL (ref 0–1.2)
BUN SERPL-MCNC: 24 MG/DL (ref 8–23)
BUN/CREAT SERPL: 24.2 (ref 7–25)
BURR CELLS BLD QL SMEAR: NORMAL
CALCIUM SPEC-SCNC: 8.5 MG/DL (ref 8.6–10.5)
CHLORIDE SERPL-SCNC: 110 MMOL/L (ref 98–107)
CO2 SERPL-SCNC: 21.7 MMOL/L (ref 22–29)
CREAT SERPL-MCNC: 0.99 MG/DL (ref 0.57–1)
DEPRECATED RDW RBC AUTO: 42.9 FL (ref 37–54)
EGFRCR SERPLBLD CKD-EPI 2021: 58.5 ML/MIN/1.73
ELLIPTOCYTES BLD QL SMEAR: NORMAL
EOSINOPHIL # BLD AUTO: 0 10*3/MM3 (ref 0–0.4)
EOSINOPHIL NFR BLD AUTO: 0 % (ref 0.3–6.2)
ERYTHROCYTE [DISTWIDTH] IN BLOOD BY AUTOMATED COUNT: 13.5 % (ref 12.3–15.4)
GLOBULIN UR ELPH-MCNC: 2.4 GM/DL
GLUCOSE BLDC GLUCOMTR-MCNC: 199 MG/DL (ref 70–99)
GLUCOSE SERPL-MCNC: 291 MG/DL (ref 65–99)
HCT VFR BLD AUTO: 33.5 % (ref 34–46.6)
HGB BLD-MCNC: 11.5 G/DL (ref 12–15.9)
IMM GRANULOCYTES # BLD AUTO: 0.02 10*3/MM3 (ref 0–0.05)
IMM GRANULOCYTES NFR BLD AUTO: 0.5 % (ref 0–0.5)
LARGE PLATELETS: NORMAL
LYMPHOCYTES # BLD AUTO: 0.32 10*3/MM3 (ref 0.7–3.1)
LYMPHOCYTES NFR BLD AUTO: 8.3 % (ref 19.6–45.3)
MAGNESIUM SERPL-MCNC: 2 MG/DL (ref 1.6–2.4)
MCH RBC QN AUTO: 29.6 PG (ref 26.6–33)
MCHC RBC AUTO-ENTMCNC: 34.3 G/DL (ref 31.5–35.7)
MCV RBC AUTO: 86.3 FL (ref 79–97)
MICROCYTES BLD QL: NORMAL
MONOCYTES # BLD AUTO: 0.25 10*3/MM3 (ref 0.1–0.9)
MONOCYTES NFR BLD AUTO: 6.5 % (ref 5–12)
NEUTROPHILS NFR BLD AUTO: 3.26 10*3/MM3 (ref 1.7–7)
NEUTROPHILS NFR BLD AUTO: 84.7 % (ref 42.7–76)
NRBC BLD AUTO-RTO: 0 /100 WBC (ref 0–0.2)
PHOSPHATE SERPL-MCNC: 1.9 MG/DL (ref 2.5–4.5)
PLATELET # BLD AUTO: 81 10*3/MM3 (ref 140–450)
PMV BLD AUTO: 13.8 FL (ref 6–12)
POIKILOCYTOSIS BLD QL SMEAR: NORMAL
POTASSIUM SERPL-SCNC: 3.7 MMOL/L (ref 3.5–5.2)
PROT SERPL-MCNC: 5.5 G/DL (ref 6–8.5)
RBC # BLD AUTO: 3.88 10*6/MM3 (ref 3.77–5.28)
SMALL PLATELETS BLD QL SMEAR: NORMAL
SODIUM SERPL-SCNC: 140 MMOL/L (ref 136–145)
WBC MORPH BLD: NORMAL
WBC NRBC COR # BLD: 3.85 10*3/MM3 (ref 3.4–10.8)

## 2023-03-11 PROCEDURE — 94799 UNLISTED PULMONARY SVC/PX: CPT

## 2023-03-11 PROCEDURE — 85007 BL SMEAR W/DIFF WBC COUNT: CPT | Performed by: INTERNAL MEDICINE

## 2023-03-11 PROCEDURE — 94664 DEMO&/EVAL PT USE INHALER: CPT

## 2023-03-11 PROCEDURE — 84100 ASSAY OF PHOSPHORUS: CPT | Performed by: INTERNAL MEDICINE

## 2023-03-11 PROCEDURE — 99239 HOSP IP/OBS DSCHRG MGMT >30: CPT | Performed by: INTERNAL MEDICINE

## 2023-03-11 PROCEDURE — 82962 GLUCOSE BLOOD TEST: CPT

## 2023-03-11 PROCEDURE — 80053 COMPREHEN METABOLIC PANEL: CPT | Performed by: INTERNAL MEDICINE

## 2023-03-11 PROCEDURE — 63710000001 INSULIN LISPRO (HUMAN) PER 5 UNITS: Performed by: INTERNAL MEDICINE

## 2023-03-11 PROCEDURE — 83735 ASSAY OF MAGNESIUM: CPT | Performed by: INTERNAL MEDICINE

## 2023-03-11 PROCEDURE — 85025 COMPLETE CBC W/AUTO DIFF WBC: CPT | Performed by: INTERNAL MEDICINE

## 2023-03-11 PROCEDURE — 82248 BILIRUBIN DIRECT: CPT | Performed by: INTERNAL MEDICINE

## 2023-03-11 RX ORDER — ALBUTEROL SULFATE 90 UG/1
2 AEROSOL, METERED RESPIRATORY (INHALATION) EVERY 4 HOURS PRN
Qty: 8 G | Refills: 0 | Status: SHIPPED | OUTPATIENT
Start: 2023-03-11

## 2023-03-11 RX ORDER — GLIPIZIDE 5 MG/1
5 TABLET ORAL
Qty: 14 TABLET | Refills: 0 | Status: SHIPPED | OUTPATIENT
Start: 2023-03-11 | End: 2023-03-18

## 2023-03-11 RX ADMIN — OLANZAPINE 5 MG: 5 TABLET, FILM COATED ORAL at 08:59

## 2023-03-11 RX ADMIN — MULTIPLE VITAMINS W/ MINERALS TAB 1 TABLET: TAB at 08:59

## 2023-03-11 RX ADMIN — IPRATROPIUM BROMIDE AND ALBUTEROL SULFATE 3 ML: .5; 2.5 SOLUTION RESPIRATORY (INHALATION) at 07:00

## 2023-03-11 RX ADMIN — DILTIAZEM HYDROCHLORIDE 180 MG: 180 CAPSULE, COATED, EXTENDED RELEASE ORAL at 08:59

## 2023-03-11 RX ADMIN — IPRATROPIUM BROMIDE AND ALBUTEROL SULFATE 3 ML: .5; 2.5 SOLUTION RESPIRATORY (INHALATION) at 00:09

## 2023-03-11 RX ADMIN — INSULIN LISPRO 4 UNITS: 100 INJECTION, SOLUTION INTRAVENOUS; SUBCUTANEOUS at 08:59

## 2023-03-11 RX ADMIN — BUDESONIDE 0.5 MG: 0.5 INHALANT ORAL at 07:00

## 2023-03-11 RX ADMIN — ARFORMOTEROL TARTRATE 15 MCG: 15 SOLUTION RESPIRATORY (INHALATION) at 07:00

## 2023-03-11 RX ADMIN — SERTRALINE HYDROCHLORIDE 150 MG: 50 TABLET ORAL at 08:59

## 2023-03-11 NOTE — NURSING NOTE
Exercise Oximetry    Patient Name:Hilary Xie   MRN: [id number]   Date: 03/11/23             ROOM AIR BASELINE   SpO2% 96   Heart Rate    Blood Pressure      EXERCISE ON ROOM AIR SpO2% EXERCISE ON O2 @  LPM SpO2%   1 MINUTE 96 1 MINUTE     2 MINUTES 97 2 MINUTES    3 MINUTES 97 3 MINUTES    4 MINUTES 97 4 MINUTES    5 MINUTES 96 5 MINUTES    6 MINUTES 97 6 MINUTES               Distance Walked  300 ft Distance Walked   Dyspnea (Rao Scale)  none Dyspnea (Rao Scale)   Fatigue (Rao Scale)  none Fatigue (Rao Scale)   SpO2% Post Exercise 97 SpO2% Post Exercise   HR Post Exercise   HR Post Exercise   Time to Recovery  Time to Recovery     Comments:

## 2023-03-11 NOTE — DISCHARGE SUMMARY
[facility]         HOSPITALIST  DISCHARGE SUMMARY    Patient Name: Hilary Xie  : 1945  MRN: [id number]    Date of Admission: 3/8/2023  Date of Discharge:  3/11/2023  Primary Care Physician: Clarence Martínez MD    Consults     Date and Time Order Name Status Description    3/8/2023  4:57 PM Inpatient Hospitalist Consult            Active and Resolved Hospital Problems:  COVID-19 pneumonia  Acute hypoxemic respiratory failure, increased work of breathing, satting 86% on room air  Atrial fibrillation on anticoagulation  Hyperglycemia  Diabetes  Hyperlipidemia  Hypertension    Hospital Course     Hospital Course:  Hilary Xie is a 77 y.o. female with a past medical history significant for A-fib, hyperlipidemia, hypertension who presents to the hospital with shortness of breath, cough, fatigue, patient was checked for COVID-19, this was positive, chest x-ray was significant for multifocal pneumonia.  Patient was given nebulized breathing treatments, steroids, given patient's hypoxemia to 86% on room air the hospital service was asked to admit for further work-up and management.  Patient tolerated treatment with steroids, remdesivir well, patient was weaned to room air.  Patient is a borderline diabetic, has been trying to treat her sugars with diet at home, she did not tolerate metformin in the past.  Given the patient was on steroids her sugars were noted to be elevated, this is also likely secondary to stress from her infection.  I discussed with the patient that I did not want to send her home with insulin so we would trial glipizide 5 mg twice daily, patient states that she has a glucometer and will check her blood sugar twice daily.  I instructed her to write these down, checking a morning fasting blood sugar so that she could present this to her PCP, from COVID-19 standpoint patient is doing very well, she should continue to monitor sugars and follow-up with her PCP in 3 to 7 days of  discharge for continued treatment and monitoring.    Day of Discharge     Vital Signs:  Temp:  [97.4 °F (36.3 °C)-98.2 °F (36.8 °C)] 98.2 °F (36.8 °C)  Heart Rate:  [55-75] 75  Resp:  [18] 18  BP: (107-137)/(46-53) 137/46  Flow (L/min):  [1.5] 1.5    Physical Exam:   GEN: Resting comfortably in bed, no acute distress  HEENT: Moist mucous membranes  LUNGS: Equal breath rise bilaterally, no accessory muscle use, no audible wheezing    Discharge Details        Discharge Medications      New Medications      Instructions Start Date   albuterol sulfate  (90 Base) MCG/ACT inhaler  Commonly known as: PROVENTIL HFA;VENTOLIN HFA;PROAIR HFA   2 puffs, Inhalation, Every 4 Hours PRN      glipizide 5 MG tablet  Commonly known as: Glucotrol   5 mg, Oral, 2 Times Daily Before Meals         Continue These Medications      Instructions Start Date   dilTIAZem  MG 24 hr capsule  Commonly known as: CARDIZEM CD   TAKE 1 CAPSULE BY MOUTH EVERY DAY      ferrous sulfate 325 (65 FE) MG tablet  Commonly known as: FeroSul   325 mg, Oral, 2 Times Daily      loratadine 10 MG tablet  Commonly known as: CLARITIN   TAKE 1 TABLET BY MOUTH EVERY DAY      Namzaric 14-10 MG capsule sustained-release 24 hr  Generic drug: Memantine HCl-Donepezil HCl   TAKE 1 CAPSULE BY MOUTH EVERY EVENING      OLANZapine 5 MG tablet  Commonly known as: zyPREXA   5 mg, Oral, Daily      potassium chloride 10 MEQ CR tablet  Commonly known as: K-DUR,KLOR-CON   20 mEq, Oral, Daily      risedronate 150 MG tablet  Commonly known as: ACTONEL   TAKE 1 TABLET BY MOUTH ONCE EVERY 30 DAYS      rivaroxaban 20 MG tablet  Commonly known as: XARELTO   20 mg, Oral, Daily With Dinner      rosuvastatin 20 MG tablet  Commonly known as: CRESTOR   20 mg, Oral, Daily      sertraline 100 MG tablet  Commonly known as: ZOLOFT   150 mg, Oral, Daily      traZODone 100 MG tablet  Commonly known as: DESYREL   100 mg, Oral, Nightly, TAKE 1 & 1/2 TABLETS BY MOUTH EVERY EVENING, MAY  INCREASE TO 2 TABLETS IF NEEDED      Vitamin D3 50 MCG (2000 UT) capsule   2,000 Units, Oral, Daily             Allergies   Allergen Reactions   • Donepezil Nausea Only   • Sulfa Antibiotics Hives       Discharge Disposition:  Home or Self Care    Diet:  Hospital:  Diet Order   Procedures   • Diet: Regular/House Diet; Texture: Regular Texture (IDDSI 7); Fluid Consistency: Thin (IDDSI 0)       Discharge Activity:       CODE STATUS:  Code Status and Medical Interventions:   Ordered at: 03/09/23 1028     Code Status (Patient has no pulse and is not breathing):    CPR (Attempt to Resuscitate)     Medical Interventions (Patient has pulse or is breathing):    Full Support     Release to patient:    Routine Release       Future Appointments   Date Time Provider Department Center   5/18/2023  4:15 PM Clarence Martínez MD [facility]   9/1/2023  2:00 PM Elias Dougherty MD [facility] CD [facility]           Pertinent  and/or Most Recent Results     IMAGING:  XR Chest 1 View    Result Date: 3/8/2023  PROCEDURE: XR CHEST 1 VW  COMPARISON: None  INDICATIONS: SOA, CHF/COPD  FINDINGS:  The heart is normal in size.  The lungs are well-expanded.  Patchy bilateral airspace disease is consistent with multifocal pneumonia.  Bony structures appear intact.  Metallic anchor in the right humeral head is consistent with rotator cuff surgery.        Patchy bilateral airspace disease consistent with multifocal pneumonia.       HALEIGH DALEY MD       Electronically Signed and Approved By: HALEIGH DALEY MD on 3/08/2023 at 15:18               LAB RESULTS:      Lab 03/11/23  0428 03/10/23  0513 03/09/23  0522 03/08/23  1429   WBC 3.85 2.72* 1.24* 3.52   HEMOGLOBIN 11.5* 12.0 12.7 14.0   HEMATOCRIT 33.5* 36.2 38.7 41.0   PLATELETS 81* 70* 62* 88*   NEUTROS ABS 3.26 2.37 0.91* 2.29   IMMATURE GRANS (ABS) 0.02 0.01 0.01 0.02   LYMPHS ABS 0.32* 0.25* 0.26* 0.88   MONOS ABS 0.25 0.09* 0.05* 0.29   EOS ABS 0.00 0.00 0.00 0.03   MCV 86.3 88.7 89.8 87.6    PROTIME  --   --   --  14.5         Lab 03/11/23  0428 03/10/23  0513 03/09/23  0523 03/08/23  1429   SODIUM 140 137 138 139   POTASSIUM 3.7 3.3* 3.8 3.5   CHLORIDE 110* 106 108* 105   CO2 21.7* 18.0* 18.2* 21.8*   ANION GAP 8.3 13.0 11.8 12.2   BUN 24* 19 16 18   CREATININE 0.99 1.20* 1.00  1.00 1.42*   EGFR 58.5* 46.4* 57.8*  57.8* 37.9*   GLUCOSE 291* 364* 317* 249*   CALCIUM 8.5* 8.7 8.7 9.2   MAGNESIUM 2.0 1.9 2.0  --    PHOSPHORUS 1.9* 2.2* 2.5  --    HEMOGLOBIN A1C  --   --   --  7.50*         Lab 03/11/23  0428 03/10/23  0513 03/09/23  0523 03/08/23  1429   TOTAL PROTEIN 5.5* 5.8* 5.9*  5.9* 6.6   ALBUMIN 3.1* 3.1* 3.3*  3.3* 3.7   GLOBULIN 2.4 2.7 2.6 2.9   ALT (SGPT) 11 7 7  7 11   AST (SGOT) 14 13 13  13 14   BILIRUBIN 0.3 0.3 0.4  0.4 0.7   BILIRUBIN DIRECT <0.2 <0.2 <0.2  --    ALK PHOS 57 59 62  62 71         Lab 03/08/23  1429   PROBNP 566.2   HSTROP T 14*   PROTIME 14.5   INR 1.12         Lab 03/08/23  1429   CHOLESTEROL 129   LDL CHOL 61   HDL CHOL 44   TRIGLYCERIDES 136             Brief Urine Lab Results     None        Microbiology Results (last 10 days)     ** No results found for the last 240 hours. **                Time spent on Discharge including face to face service: Greater than 30 minutes      Electronically signed by Miguel Angel Gusman MD, 03/11/23, 11:30 AM EST.

## 2023-03-11 NOTE — PLAN OF CARE
Goal Outcome Evaluation:              Outcome Evaluation: Pt rested well during shift. Pt has had no complaints or concerns voiced during shift. Pt weaned off of oxygen and has maintained sats of 90 or above.

## 2023-03-11 NOTE — PLAN OF CARE
Problem: Adult Inpatient Plan of Care  Goal: Plan of Care Review  Outcome: Met  Flowsheets (Taken 3/11/2023 1033)  Progress: no change  Plan of Care Reviewed With: patient  Outcome Evaluation: Patient discharging home.  Goal: Patient-Specific Goal (Individualized)  Outcome: Met  Goal: Absence of Hospital-Acquired Illness or Injury  Outcome: Met  Intervention: Identify and Manage Fall Risk  Recent Flowsheet Documentation  Taken 3/11/2023 0859 by Mariela Marrero RN  Safety Promotion/Fall Prevention: safety round/check completed  Taken 3/11/2023 0746 by Mariela Marrero RN  Safety Promotion/Fall Prevention: safety round/check completed  Taken 3/11/2023 0715 by Mariela Marrero RN  Safety Promotion/Fall Prevention:   assistive device/personal items within reach   clutter free environment maintained   lighting adjusted   nonskid shoes/slippers when out of bed   room organization consistent   safety round/check completed  Intervention: Prevent Infection  Recent Flowsheet Documentation  Taken 3/11/2023 0715 by Mariela Marrero RN  Infection Prevention:   cohorting utilized   environmental surveillance performed   hand hygiene promoted   rest/sleep promoted   single patient room provided  Goal: Optimal Comfort and Wellbeing  Outcome: Met  Intervention: Provide Person-Centered Care  Recent Flowsheet Documentation  Taken 3/11/2023 0746 by Mariela Marrero RN  Trust Relationship/Rapport:   care explained   choices provided   emotional support provided   empathic listening provided   questions answered   questions encouraged   reassurance provided   thoughts/feelings acknowledged  Goal: Readiness for Transition of Care  Outcome: Met     Problem: Fall Injury Risk  Goal: Absence of Fall and Fall-Related Injury  Outcome: Met  Intervention: Identify and Manage Contributors  Recent Flowsheet Documentation  Taken 3/11/2023 0715 by Mariela Marrero RN  Medication Review/Management:   medications reviewed   high-risk medications  identified  Intervention: Promote Injury-Free Environment  Recent Flowsheet Documentation  Taken 3/11/2023 0859 by Mariela Marrero, RN  Safety Promotion/Fall Prevention: safety round/check completed  Taken 3/11/2023 0746 by Mariela Marrero, RN  Safety Promotion/Fall Prevention: safety round/check completed  Taken 3/11/2023 0715 by Mariela Marrero, RN  Safety Promotion/Fall Prevention:   assistive device/personal items within reach   clutter free environment maintained   lighting adjusted   nonskid shoes/slippers when out of bed   room organization consistent   safety round/check completed     Problem: Diabetes Comorbidity  Goal: Blood Glucose Level Within Targeted Range  Outcome: Met     Problem: Hypertension Comorbidity  Goal: Blood Pressure in Desired Range  Outcome: Met  Intervention: Maintain Blood Pressure Management  Recent Flowsheet Documentation  Taken 3/11/2023 0715 by Mariela Marrero RN  Medication Review/Management:   medications reviewed   high-risk medications identified     Problem: Skin Injury Risk Increased  Goal: Skin Health and Integrity  Outcome: Met   Goal Outcome Evaluation:  Plan of Care Reviewed With: patient        Progress: no change  Outcome Evaluation: Patient discharging home.

## 2023-03-11 NOTE — OUTREACH NOTE
Prep Survey    Flowsheet Row Responses   [facility] patient discharged from? [facility]   Is LACE score < 7 ? No   Eligibility [facility]   Date of Admission 03/08/23   Date of Discharge 03/11/23   Discharge Disposition Home or Self Care   Discharge diagnosis COVID-19   Does the patient have one of the following disease processes/diagnoses(primary or secondary)? Other   Does the patient have Home health ordered? No   Is there a DME ordered? No   Prep survey completed? Yes          Shu YEUNG - Registered Nurse

## 2023-03-11 NOTE — CONSULTS
"Nutrition Services    Patient Name: Hilary Xie  YOB: 1945  MRN: [id number]  Admission date: 3/8/2023      CLINICAL NUTRITION ASSESSMENT      Reason for Assessment  MST score 2+     H&P:    Past Medical History:   Diagnosis Date   • Atrial fibrillation (HCC)    • Hyperlipidemia    • Hypertension         Current Problems:   Active Hospital Problems    Diagnosis    • **COVID-19         Nutrition/Diet History         Narrative     Nutrition assessment for MST score 2+.  Patient reported recent weight loss and decreased appetite on admission.  Chart review reveals stable body weight, trending up.  Patient is consuming about 75% of most meals.    Pt is at low risk per nutrition risk screening. No acute nutrition concerns or interventions at this time. RD will continue to follow and monitor per protocol.     Anthropometrics        Current Height, Weight Height: 157.5 cm (62\")  Weight: 75.2 kg (165 lb 12.6 oz)   Current BMI Body mass index is 30.32 kg/m².       Weight Hx  Wt Readings from Last 30 Encounters:   03/11/23 0500 75.2 kg (165 lb 12.6 oz)   03/08/23 1421 74 kg (163 lb 2.3 oz)   03/08/23 1314 73.8 kg (162 lb 12.8 oz)   02/15/23 1549 76.6 kg (168 lb 12.8 oz)   11/23/22 1634 75.8 kg (167 lb 3.2 oz)   08/30/22 1530 73.5 kg (162 lb)   08/18/22 1627 73.7 kg (162 lb 6.4 oz)   04/18/22 1654 72 kg (158 lb 12.8 oz)   03/07/22 1703 67.9 kg (149 lb 12.8 oz)   02/14/22 1442 68.1 kg (150 lb 3.2 oz)   01/17/22 1425 68 kg (150 lb)   03/22/21 1032 73.9 kg (163 lb)            Wt Change Observation  +10.4% x 2 months  Stable x2 years     Estimated/Assessed Needs       Energy Requirements  25 kcals/KG   EST Needs (kcal/day)  1880       Protein Requirements  0.8 g/KG   EST Daily Needs (g/day)  60       Fluid Requirements  1 mL/kcal    Estimated Needs (mL/day)  1880     Labs/Medications         Pertinent Labs Reviewed.   Results from last 7 days   Lab Units 03/11/23  0428 03/10/23  0513 03/09/23  0523   SODIUM mmol/L " 140 137 138   POTASSIUM mmol/L 3.7 3.3* 3.8   CHLORIDE mmol/L 110* 106 108*   CO2 mmol/L 21.7* 18.0* 18.2*   BUN mg/dL 24* 19 16   CREATININE mg/dL 0.99 1.20* 1.00  1.00   CALCIUM mg/dL 8.5* 8.7 8.7   BILIRUBIN mg/dL 0.3 0.3 0.4  0.4   ALK PHOS U/L 57 59 62  62   ALT (SGPT) U/L 11 7 7  7   AST (SGOT) U/L 14 13 13  13   GLUCOSE mg/dL 291* 364* 317*     Results from last 7 days   Lab Units 03/11/23  0428 03/10/23  0513 03/09/23  0523 03/09/23  0522 03/08/23  1429   MAGNESIUM mg/dL 2.0 1.9 2.0  --   --    PHOSPHORUS mg/dL 1.9* 2.2* 2.5   < >  --    HEMOGLOBIN g/dL 11.5* 12.0  --    < > 14.0   HEMATOCRIT % 33.5* 36.2  --    < > 41.0   TRIGLYCERIDES mg/dL  --   --   --   --  136    < > = values in this interval not displayed.     No results found for: COVID19  Lab Results   Component Value Date    HGBA1C 7.50 (H) 03/08/2023         Pertinent Medications Reviewed.     Current Nutrition Orders & Evaluation of Intake       Oral Nutrition     Current PO Diet Diet: Regular/House Diet; Texture: Regular Texture (IDDSI 7); Fluid Consistency: Thin (IDDSI 0)   Supplement No active supplement orders       Malnutrition Severity Assessment                Nutrition Diagnosis         Nutrition Dx Problem 1 No nutrition diagnosis at this time.       Nutrition Intervention         No intervention indicated.      Medical Nutrition Therapy/Nutrition Education          Learner     Readiness N/A  N/A     Method     Response N/A  N/A     Monitor/Evaluation        Monitor Per protocol.       Nutrition Discharge Plan         No nutrition needs identified at this time.       Electronically signed by:  Elana Sorto RD  03/11/23 08:42 EST

## 2023-03-13 ENCOUNTER — TRANSITIONAL CARE MANAGEMENT TELEPHONE ENCOUNTER (OUTPATIENT)
Dept: CALL CENTER | Facility: HOSPITAL | Age: 78
End: 2023-03-13
Payer: MEDICARE

## 2023-03-13 NOTE — OUTREACH NOTE
Call Center TCM Note    Flowsheet Row Responses   [facility] patient discharged from? [facility]   Does the patient have one of the following disease processes/diagnoses(primary or secondary)? Other   TCM attempt successful? Yes  [VR for Contacts]   Call start time 0903   Call end time 0907   Discharge diagnosis COVID-19   Person spoke with today (if not patient) and relationship Julita Mcdonald reviewed with patient/caregiver? Yes   Is the patient having any side effects they believe may be caused by any medication additions or changes? No   Does the patient have all medications ordered at discharge? Yes   Is the patient taking all medications as directed (includes completed medication regime)? Yes   Comments HOSP DC FU appt 3/15/23 @ 12pm   Does the patient have an appointment with their PCP within 7 days of discharge? Yes   Has home health visited the patient within 72 hours of discharge? N/A   Psychosocial issues? No   Did the patient receive a copy of their discharge instructions? Yes   Nursing interventions Reviewed instructions with patient   What is the patient's perception of their health status since discharge? Improving   Is the patient/caregiver able to teach back signs and symptoms related to disease process for when to call PCP? Yes   Is the patient/caregiver able to teach back signs and symptoms related to disease process for when to call 911? Yes   Is the patient/caregiver able to teach back the hierarchy of who to call/visit for symptoms/problems? PCP, Specialist, Home health nurse, Urgent Care, ED, 911 Yes   Additional teach back comments O2 stats 94% after walking.    TCM call completed? Yes   Wrap up additional comments SPoke with Julita ANDERSON,who is also a nurse, and she reports Pt has improved some however still very weak.    Call end time 0907          Kimberly Marrufo RN    3/13/2023, 09:08 EDT

## 2023-03-14 PROBLEM — Z09 HOSPITAL DISCHARGE FOLLOW-UP: Status: ACTIVE | Noted: 2023-03-14

## 2023-03-15 ENCOUNTER — OFFICE VISIT (OUTPATIENT)
Dept: INTERNAL MEDICINE | Facility: CLINIC | Age: 78
End: 2023-03-15
Payer: MEDICARE

## 2023-03-15 VITALS
HEIGHT: 62 IN | TEMPERATURE: 97.5 F | SYSTOLIC BLOOD PRESSURE: 126 MMHG | HEART RATE: 92 BPM | BODY MASS INDEX: 30.03 KG/M2 | WEIGHT: 163.2 LBS | OXYGEN SATURATION: 98 % | DIASTOLIC BLOOD PRESSURE: 80 MMHG

## 2023-03-15 DIAGNOSIS — J12.82 PNEUMONIA DUE TO COVID-19 VIRUS: ICD-10-CM

## 2023-03-15 DIAGNOSIS — I10 PRIMARY HYPERTENSION: ICD-10-CM

## 2023-03-15 DIAGNOSIS — F33.41 RECURRENT MAJOR DEPRESSION IN PARTIAL REMISSION: ICD-10-CM

## 2023-03-15 DIAGNOSIS — U07.1 PNEUMONIA DUE TO COVID-19 VIRUS: ICD-10-CM

## 2023-03-15 DIAGNOSIS — N18.32 STAGE 3B CHRONIC KIDNEY DISEASE: ICD-10-CM

## 2023-03-15 DIAGNOSIS — E11.9 TYPE 2 DIABETES MELLITUS WITHOUT COMPLICATION, WITHOUT LONG-TERM CURRENT USE OF INSULIN: ICD-10-CM

## 2023-03-15 DIAGNOSIS — Z09 HOSPITAL DISCHARGE FOLLOW-UP: Primary | ICD-10-CM

## 2023-03-15 LAB — QT INTERVAL: 414 MS

## 2023-03-15 PROCEDURE — 99496 TRANSJ CARE MGMT HIGH F2F 7D: CPT | Performed by: INTERNAL MEDICINE

## 2023-03-15 PROCEDURE — 1111F DSCHRG MED/CURRENT MED MERGE: CPT | Performed by: INTERNAL MEDICINE

## 2023-03-15 PROCEDURE — 1159F MED LIST DOCD IN RCRD: CPT | Performed by: INTERNAL MEDICINE

## 2023-03-15 PROCEDURE — 3074F SYST BP LT 130 MM HG: CPT | Performed by: INTERNAL MEDICINE

## 2023-03-15 PROCEDURE — 3079F DIAST BP 80-89 MM HG: CPT | Performed by: INTERNAL MEDICINE

## 2023-03-15 NOTE — ASSESSMENT & PLAN NOTE
Blood pressure is stable as of her 3/23 hospital follow-up.  She remains on just low to moderate dose Cardizem for underlying A-fib.

## 2023-03-15 NOTE — ASSESSMENT & PLAN NOTE
Patient had hyperglycemia while hospitalized due to the infection and the corticosteroids.  She was on sliding scale insulin there, she was sent home on twice daily low-dose glipizide.  She has had morning sugars in the 7080 ballpark already, so organ to lower it to once daily and actually leave it there until she completes the short course.  They will call if her morning sugars shoot up into the 180+ ballpark.

## 2023-03-15 NOTE — PROGRESS NOTES
Chief Complaint  Hospital f/u (Pt states that she was in hospital for COVID, she saw Trisha last Wednesday and sent her to the hospital, she states that she is feeling better. )    Subjective      Hilary Xie presents to [facility]    Hyperlipidemia  Pertinent negatives include no chest pain or shortness of breath.   Insomnia  Pertinent negatives include no abdominal pain, arthralgias, chest pain, congestion, coughing, fatigue or fever.     History of present illness:  Patient pleasant 77-year-old female with underlying PAF, DM, osteoporosis on treatment, among others, who was seen 2/22 as a New Pt and who is coming in 2/23 for routine 3-month follow-up.  We will review all her routine medications, go over her labs in detail, and address any new concerns as time permits.    ---> pt here 3/23 for TCM:  Date of Admission: 3/8/2023  Date of Discharge:  3/11/2023  COVID-19 pneumonia/multifocal pneumonia  Acute hypoxemic respiratory failure, increased work of breathing, satting 86% on room air  Atrial fibrillation on anticoagulation  Hyperglycemia =   I discussed with the patient that I did not want to send her home with insulin so we would trial glipizide 5 mg twice daily    Review of Systems   Constitutional: Negative for appetite change, fatigue and fever.   HENT: Negative for congestion and ear pain.    Eyes: Negative for blurred vision.   Respiratory: Negative for cough, chest tightness, shortness of breath and wheezing.    Cardiovascular: Negative for chest pain, palpitations and leg swelling.   Gastrointestinal: Negative for abdominal pain.   Genitourinary: Negative for difficulty urinating, dysuria and hematuria.   Musculoskeletal: Negative for arthralgias and gait problem.   Skin: Negative for skin lesions.   Neurological: Negative for syncope, memory problem and confusion.   Psychiatric/Behavioral: Negative for self-injury and depressed mood. The patient has insomnia.   "      Objective   Vital Signs:   /80   Pulse 92   Temp 97.5 °F (36.4 °C) (Skin)   Ht 157.5 cm (62.01\")   Wt 74 kg (163 lb 3.2 oz)   SpO2 98%   BMI 29.84 kg/m²           Physical Exam  Vitals and nursing note reviewed.   Constitutional:       General: She is not in acute distress.     Appearance: Normal appearance. She is not toxic-appearing.   HENT:      Head: Atraumatic.      Right Ear: External ear normal.      Left Ear: External ear normal.      Nose: Nose normal.      Mouth/Throat:      Mouth: Mucous membranes are moist.   Eyes:      General:         Right eye: No discharge.         Left eye: No discharge.      Extraocular Movements: Extraocular movements intact.      Pupils: Pupils are equal, round, and reactive to light.   Cardiovascular:      Rate and Rhythm: Normal rate. Rhythm irregular.      Pulses: Normal pulses.      Heart sounds: Normal heart sounds. No murmur heard.    No gallop.      Comments: Heart tones irregularly irregular, no S3.  Pulmonary:      Effort: Pulmonary effort is normal. No respiratory distress.      Breath sounds: No wheezing, rhonchi or rales.      Comments: Lung fields clear bilaterally.  Abdominal:      General: There is no distension.      Palpations: Abdomen is soft. There is no mass.      Tenderness: There is no abdominal tenderness. There is no guarding.      Comments: No epigastric tenderness.   Musculoskeletal:         General: No swelling or tenderness.      Cervical back: No tenderness.      Right lower leg: No edema.      Left lower leg: No edema.   Skin:     General: Skin is warm and dry.      Findings: No rash.   Neurological:      General: No focal deficit present.      Mental Status: She is alert and oriented to person, place, and time. Mental status is at baseline.      Motor: No weakness.      Gait: Gait normal.   Psychiatric:         Mood and Affect: Mood normal.         Thought Content: Thought content normal.          Result Review   The following data " was reviewed by: Clarence Martínez MD on 02/14/2022:  [x] Laboratory  [] Microbiology  [] Radiology  [] EKG/telemetry  [] Cardiology/Vascular  [] Pathology  [x] Old records             Assessment and Plan   Diagnoses and all orders for this visit:    1. Hospital discharge follow-up (Primary)  Assessment & Plan:  Patient being seen 3/23 for a TCM.  She was hospitalized for 3 to 4 days with COVID-pneumonia.  She did initially require some oxygen for sats of 86%.  She received short course of remdesivir and steroids.  She weaned off the oxygen prior to discharge, passed her walk test, they are monitoring O2 at home as well.  Presently she is 98%.  She did have multifocal pneumonia on the chest x-ray, we will repeat this just before return to office in the spring.      2. Type 2 diabetes mellitus without complication, without long-term current use of insulin ([facility])  Assessment & Plan:  Patient had hyperglycemia while hospitalized due to the infection and the corticosteroids.  She was on sliding scale insulin there, she was sent home on twice daily low-dose glipizide.  She has had morning sugars in the 7080 ballpark already, so organ to lower it to once daily and actually leave it there until she completes the short course.  They will call if her morning sugars shoot up into the 180+ ballpark.      3. Primary hypertension  Assessment & Plan:  Blood pressure is stable as of her 3/23 hospital follow-up.  She remains on just low to moderate dose Cardizem for underlying A-fib.      4. Stage 3b chronic kidney disease (HCC)  Assessment & Plan:  GFR was actually improved in the 50+ ballpark during her hospitalization due to some IV fluids etc.  She did have issues with her phosphorus, so we will follow that up on return to office.  Patient still requiring low-dose potassium supplementation.      5. Pneumonia due to COVID-19 virus  -     XR Chest PA & Lateral; Future    6. Recurrent major depression in partial remission  (HCC)  Assessment & Plan:  Patient still with sleep issues as of her 3/23 hospital follow-up.  They will titrate her to 150 of trazodone and let me know if beneficial.             Follow Up   Return for Next scheduled follow up.  Patient was given instructions and counseling regarding her condition or for health maintenance advice. Please see specific information pulled into the AVS if appropriate.

## 2023-03-15 NOTE — ASSESSMENT & PLAN NOTE
GFR was actually improved in the 50+ ballpark during her hospitalization due to some IV fluids etc.  She did have issues with her phosphorus, so we will follow that up on return to office.  Patient still requiring low-dose potassium supplementation.

## 2023-03-15 NOTE — ASSESSMENT & PLAN NOTE
Patient still with sleep issues as of her 3/23 hospital follow-up.  They will titrate her to 150 of trazodone and let me know if beneficial.

## 2023-03-15 NOTE — ASSESSMENT & PLAN NOTE
Patient being seen 3/23 for a TCM.  She was hospitalized for 3 to 4 days with COVID-pneumonia.  She did initially require some oxygen for sats of 86%.  She received short course of remdesivir and steroids.  She weaned off the oxygen prior to discharge, passed her walk test, they are monitoring O2 at home as well.  Presently she is 98%.  She did have multifocal pneumonia on the chest x-ray, we will repeat this just before return to office in the spring.

## 2023-04-17 RX ORDER — DILTIAZEM HYDROCHLORIDE 180 MG/1
180 CAPSULE, COATED, EXTENDED RELEASE ORAL DAILY
Qty: 90 CAPSULE | Refills: 1 | Status: CANCELLED | OUTPATIENT
Start: 2023-04-17

## 2023-04-17 RX ORDER — FERROUS SULFATE 325(65) MG
1 TABLET ORAL 2 TIMES DAILY
Qty: 180 TABLET | Refills: 1 | Status: SHIPPED | OUTPATIENT
Start: 2023-04-17

## 2023-04-17 RX ORDER — OLANZAPINE 5 MG/1
5 TABLET ORAL DAILY
Qty: 90 TABLET | Refills: 1 | Status: CANCELLED | OUTPATIENT
Start: 2023-04-17

## 2023-04-17 RX ORDER — MEMANTINE HYDROCHLORIDE AND DONEPEZIL HYDROCHLORIDE 14; 10 MG/1; MG/1
1 CAPSULE ORAL EVERY EVENING
Qty: 30 CAPSULE | Refills: 5 | Status: SHIPPED | OUTPATIENT
Start: 2023-04-17

## 2023-04-17 RX ORDER — DILTIAZEM HYDROCHLORIDE 180 MG/1
CAPSULE, COATED, EXTENDED RELEASE ORAL
Qty: 90 CAPSULE | Refills: 1 | Status: SHIPPED | OUTPATIENT
Start: 2023-04-17

## 2023-04-17 RX ORDER — OLANZAPINE 5 MG/1
5 TABLET ORAL DAILY
Qty: 90 TABLET | Refills: 1 | Status: SHIPPED | OUTPATIENT
Start: 2023-04-17

## 2023-04-27 RX ORDER — RISEDRONATE SODIUM 150 MG/1
TABLET, FILM COATED ORAL
Qty: 3 TABLET | Refills: 1 | Status: SHIPPED | OUTPATIENT
Start: 2023-04-27

## 2023-05-12 ENCOUNTER — LAB (OUTPATIENT)
Dept: LAB | Facility: HOSPITAL | Age: 78
End: 2023-05-12
Payer: MEDICARE

## 2023-05-12 ENCOUNTER — HOSPITAL ENCOUNTER (OUTPATIENT)
Dept: GENERAL RADIOLOGY | Facility: HOSPITAL | Age: 78
Discharge: HOME | End: 2023-05-12
Payer: MEDICARE

## 2023-05-12 DIAGNOSIS — D50.8 IRON DEFICIENCY ANEMIA SECONDARY TO INADEQUATE DIETARY IRON INTAKE: ICD-10-CM

## 2023-05-12 DIAGNOSIS — U07.1 PNEUMONIA DUE TO COVID-19 VIRUS: ICD-10-CM

## 2023-05-12 DIAGNOSIS — I10 PRIMARY HYPERTENSION: ICD-10-CM

## 2023-05-12 DIAGNOSIS — E11.9 TYPE 2 DIABETES MELLITUS WITHOUT COMPLICATION, WITHOUT LONG-TERM CURRENT USE OF INSULIN: ICD-10-CM

## 2023-05-12 DIAGNOSIS — E78.2 MIXED HYPERLIPIDEMIA: ICD-10-CM

## 2023-05-12 DIAGNOSIS — N18.32 STAGE 3B CHRONIC KIDNEY DISEASE: ICD-10-CM

## 2023-05-12 DIAGNOSIS — J12.82 PNEUMONIA DUE TO COVID-19 VIRUS: ICD-10-CM

## 2023-05-12 LAB
ALBUMIN SERPL-MCNC: 3.8 G/DL (ref 3.5–5.2)
ALBUMIN/GLOB SERPL: 1.5 G/DL
ALP SERPL-CCNC: 68 U/L (ref 39–117)
ALT SERPL W P-5'-P-CCNC: 13 U/L (ref 1–33)
ANION GAP SERPL CALCULATED.3IONS-SCNC: 11.9 MMOL/L (ref 5–15)
AST SERPL-CCNC: 14 U/L (ref 1–32)
BASOPHILS # BLD AUTO: 0.07 10*3/MM3 (ref 0–0.2)
BASOPHILS NFR BLD AUTO: 1.1 % (ref 0–1.5)
BILIRUB SERPL-MCNC: 0.6 MG/DL (ref 0–1.2)
BUN SERPL-MCNC: 19 MG/DL (ref 8–23)
BUN/CREAT SERPL: 16.4 (ref 7–25)
CALCIUM SPEC-SCNC: 10 MG/DL (ref 8.6–10.5)
CHLORIDE SERPL-SCNC: 107 MMOL/L (ref 98–107)
CHOLEST SERPL-MCNC: 172 MG/DL (ref 0–200)
CO2 SERPL-SCNC: 23.1 MMOL/L (ref 22–29)
CREAT SERPL-MCNC: 1.16 MG/DL (ref 0.57–1)
DEPRECATED RDW RBC AUTO: 40.6 FL (ref 37–54)
EGFRCR SERPLBLD CKD-EPI 2021: 48.4 ML/MIN/1.73
EOSINOPHIL # BLD AUTO: 0.3 10*3/MM3 (ref 0–0.4)
EOSINOPHIL NFR BLD AUTO: 4.6 % (ref 0.3–6.2)
ERYTHROCYTE [DISTWIDTH] IN BLOOD BY AUTOMATED COUNT: 12.7 % (ref 12.3–15.4)
FERRITIN SERPL-MCNC: 101 NG/ML (ref 13–150)
GLOBULIN UR ELPH-MCNC: 2.6 GM/DL
GLUCOSE SERPL-MCNC: 195 MG/DL (ref 65–99)
HBA1C MFR BLD: 7.1 % (ref 4.8–5.6)
HCT VFR BLD AUTO: 41.8 % (ref 34–46.6)
HDLC SERPL-MCNC: 59 MG/DL (ref 40–60)
HGB BLD-MCNC: 13.6 G/DL (ref 12–15.9)
IMM GRANULOCYTES # BLD AUTO: 0.02 10*3/MM3 (ref 0–0.05)
IMM GRANULOCYTES NFR BLD AUTO: 0.3 % (ref 0–0.5)
IRON 24H UR-MRATE: 55 MCG/DL (ref 37–145)
IRON SATN MFR SERPL: 17 % (ref 20–50)
LDLC SERPL CALC-MCNC: 95 MG/DL (ref 0–100)
LDLC/HDLC SERPL: 1.58 {RATIO}
LYMPHOCYTES # BLD AUTO: 1.98 10*3/MM3 (ref 0.7–3.1)
LYMPHOCYTES NFR BLD AUTO: 30.4 % (ref 19.6–45.3)
MCH RBC QN AUTO: 28.8 PG (ref 26.6–33)
MCHC RBC AUTO-ENTMCNC: 32.5 G/DL (ref 31.5–35.7)
MCV RBC AUTO: 88.6 FL (ref 79–97)
MONOCYTES # BLD AUTO: 0.4 10*3/MM3 (ref 0.1–0.9)
MONOCYTES NFR BLD AUTO: 6.1 % (ref 5–12)
NEUTROPHILS NFR BLD AUTO: 3.74 10*3/MM3 (ref 1.7–7)
NEUTROPHILS NFR BLD AUTO: 57.5 % (ref 42.7–76)
NRBC BLD AUTO-RTO: 0 /100 WBC (ref 0–0.2)
PLATELET # BLD AUTO: 202 10*3/MM3 (ref 140–450)
PMV BLD AUTO: 12.4 FL (ref 6–12)
POTASSIUM SERPL-SCNC: 4.8 MMOL/L (ref 3.5–5.2)
PROT SERPL-MCNC: 6.4 G/DL (ref 6–8.5)
RBC # BLD AUTO: 4.72 10*6/MM3 (ref 3.77–5.28)
SODIUM SERPL-SCNC: 142 MMOL/L (ref 136–145)
TIBC SERPL-MCNC: 322 MCG/DL (ref 298–536)
TRANSFERRIN SERPL-MCNC: 216 MG/DL (ref 200–360)
TRIGL SERPL-MCNC: 100 MG/DL (ref 0–150)
VLDLC SERPL-MCNC: 18 MG/DL (ref 5–40)
WBC NRBC COR # BLD: 6.51 10*3/MM3 (ref 3.4–10.8)

## 2023-05-12 PROCEDURE — 71046 X-RAY EXAM CHEST 2 VIEWS: CPT

## 2023-05-12 PROCEDURE — 83540 ASSAY OF IRON: CPT

## 2023-05-12 PROCEDURE — 85025 COMPLETE CBC W/AUTO DIFF WBC: CPT

## 2023-05-12 PROCEDURE — 82728 ASSAY OF FERRITIN: CPT

## 2023-05-12 PROCEDURE — 83036 HEMOGLOBIN GLYCOSYLATED A1C: CPT

## 2023-05-12 PROCEDURE — 80053 COMPREHEN METABOLIC PANEL: CPT

## 2023-05-12 PROCEDURE — 36415 COLL VENOUS BLD VENIPUNCTURE: CPT

## 2023-05-12 PROCEDURE — 80061 LIPID PANEL: CPT

## 2023-05-12 PROCEDURE — 84466 ASSAY OF TRANSFERRIN: CPT

## 2023-05-24 ENCOUNTER — OFFICE VISIT (OUTPATIENT)
Dept: INTERNAL MEDICINE | Facility: CLINIC | Age: 78
End: 2023-05-24
Payer: MEDICARE

## 2023-05-24 VITALS
BODY MASS INDEX: 30.33 KG/M2 | TEMPERATURE: 96.2 F | SYSTOLIC BLOOD PRESSURE: 110 MMHG | HEART RATE: 116 BPM | WEIGHT: 164.8 LBS | OXYGEN SATURATION: 97 % | DIASTOLIC BLOOD PRESSURE: 68 MMHG | HEIGHT: 62 IN

## 2023-05-24 DIAGNOSIS — Z00.00 MEDICARE ANNUAL WELLNESS VISIT, SUBSEQUENT: Primary | ICD-10-CM

## 2023-05-24 DIAGNOSIS — D50.8 IRON DEFICIENCY ANEMIA SECONDARY TO INADEQUATE DIETARY IRON INTAKE: ICD-10-CM

## 2023-05-24 DIAGNOSIS — N18.32 STAGE 3B CHRONIC KIDNEY DISEASE: ICD-10-CM

## 2023-05-24 DIAGNOSIS — I10 PRIMARY HYPERTENSION: ICD-10-CM

## 2023-05-24 DIAGNOSIS — E11.9 TYPE 2 DIABETES MELLITUS WITHOUT COMPLICATION, WITHOUT LONG-TERM CURRENT USE OF INSULIN: ICD-10-CM

## 2023-05-24 DIAGNOSIS — M81.0 AGE-RELATED OSTEOPOROSIS WITHOUT CURRENT PATHOLOGICAL FRACTURE: ICD-10-CM

## 2023-05-24 DIAGNOSIS — F03.90 MAJOR NEUROCOGNITIVE DISORDER: ICD-10-CM

## 2023-05-24 PROBLEM — R26.89 SHUFFLING GAIT: Status: RESOLVED | Noted: 2021-05-03 | Resolved: 2023-05-24

## 2023-05-24 PROBLEM — R93.89 ABNORMAL MAGNETIC RESONANCE IMAGING STUDY: Status: RESOLVED | Noted: 2021-03-24 | Resolved: 2023-05-24

## 2023-05-24 PROBLEM — D89.832 CYTOKINE RELEASE SYNDROME, GRADE 2: Status: RESOLVED | Noted: 2023-03-11 | Resolved: 2023-05-24

## 2023-05-24 PROBLEM — U07.1 COVID-19: Status: RESOLVED | Noted: 2023-03-08 | Resolved: 2023-05-24

## 2023-05-24 RX ORDER — MEMANTINE HYDROCHLORIDE AND DONEPEZIL HYDROCHLORIDE 14; 10 MG/1; MG/1
1 CAPSULE ORAL EVERY EVENING
Qty: 90 CAPSULE | Refills: 1 | Status: SHIPPED | OUTPATIENT
Start: 2023-05-24

## 2023-05-24 RX ORDER — FERROUS SULFATE 325(65) MG
1 TABLET ORAL DAILY
Qty: 90 TABLET | Refills: 1 | Status: SHIPPED | OUTPATIENT
Start: 2023-05-24

## 2023-05-24 NOTE — ASSESSMENT & PLAN NOTE
Blood pressure well controlled as of her 5/23 office visit.  She will continue with moderate dose Cardizem only which is on board for her A-fib.

## 2023-05-24 NOTE — PROGRESS NOTES
The ABCs of the Annual Wellness Visit  Subsequent Medicare Wellness Visit    Subjective      Hilary Xie is a 78 y.o. female who presents for a Subsequent Medicare Wellness Visit.    The following portions of the patient's history were reviewed and   updated as appropriate: allergies, current medications, past family history, past medical history, past social history, past surgical history and problem list.    Compared to one year ago, the patient feels her physical   health is the same.    Compared to one year ago, the patient feels her mental   health is the same.    Recent Hospitalizations:  This patient has had a [facility] admission record on file within the last 365 days.    Current Medical Providers:  Patient Care Team:  Clarence Martínez MD as PCP - General (Internal Medicine)    Outpatient Medications Prior to Visit   Medication Sig Dispense Refill   • albuterol sulfate  (90 Base) MCG/ACT inhaler Inhale 2 puffs Every 4 (Four) Hours As Needed for Wheezing or Shortness of Air. 8 g 0   • Cholecalciferol (Vitamin D3) 50 MCG (2000 UT) capsule TAKE 1 CAPSULE BY MOUTH DAILY 90 capsule 1   • dilTIAZem CD (CARDIZEM CD) 180 MG 24 hr capsule TAKE 1 CAPSULE BY MOUTH EVERY DAY 90 capsule 1   • loratadine (CLARITIN) 10 MG tablet TAKE 1 TABLET BY MOUTH EVERY DAY (Patient taking differently: Take 1 tablet by mouth Daily.) 30 tablet 5   • Memantine HCl-Donepezil HCl (Namzaric) 14-10 MG capsule sustained-release 24 hr Take 1 capsule by mouth Every Evening. 30 capsule 5   • OLANZapine (zyPREXA) 5 MG tablet TAKE 1 TABLET BY MOUTH DAILY. 90 tablet 1   • risedronate (ACTONEL) 150 MG tablet TAKE 1 TABLET BY MOUTH ONCE EVERY 30 DAYS 3 tablet 1   • rivaroxaban (XARELTO) 20 MG tablet Take 1 tablet by mouth Daily With Dinner. 90 tablet 3   • rosuvastatin (CRESTOR) 20 MG tablet Take 1 tablet by mouth Daily. 90 tablet 1   • sertraline (ZOLOFT) 100 MG tablet Take 1.5 tablets by mouth Daily. 135 tablet 1   • traZODone (DESYREL)  100 MG tablet Take 1 tablet by mouth Every Night. TAKE 1 & 1/2 TABLETS BY MOUTH EVERY EVENING, MAY INCREASE TO 2 TABLETS IF NEEDED 90 tablet 1   • ferrous sulfate (FeroSul) 325 (65 FE) MG tablet Take 1 tablet by mouth 2 (Two) Times a Day. 180 tablet 1   • glipizide (Glucotrol) 5 MG tablet Take 1 tablet by mouth 2 (Two) Times a Day Before Meals for 7 days. 14 tablet 0   • potassium chloride (K-DUR,KLOR-CON) 10 MEQ CR tablet Take 2 tablets by mouth Daily. 90 tablet 1     No facility-administered medications prior to visit.       No opioid medication identified on active medication list. I have reviewed chart for other potential  high risk medication/s and harmful drug interactions in the elderly.          Aspirin is not on active medication list.  Aspirin use is not indicated based on review of current medical condition/s. Risk of harm outweighs potential benefits.  .    Patient Active Problem List   Diagnosis   • Longstanding persistent atrial fibrillation   • Primary hypertension   • Recurrent major depression in partial remission   • Type 2 diabetes mellitus without complication, without long-term current use of insulin   • Mixed hyperlipidemia   • Iron deficiency anemia secondary to inadequate dietary iron intake   • Vitamin D deficiency   • Age-related osteoporosis without current pathological fracture   • Primary osteoarthritis involving multiple joints   • Major neurocognitive disorder   • MARY on CPAP   • Stage 3b chronic kidney disease   • Subclinical hypothyroidism   • Hospital discharge follow-up   • Medicare annual wellness visit, subsequent     Advance Care Planning   Advance Care Planning     Advance Directive is not on file.  ACP discussion was held with the patient during this visit. Patient does not have an advance directive, information provided.     Objective    Vitals:    05/24/23 1643   BP: 110/68   Pulse: 116   Temp: 96.2 °F (35.7 °C)   TempSrc: Skin   SpO2: 97%   Weight: 74.8 kg (164 lb 12.8 oz)  "  Height: 157.5 cm (62.01\")     Estimated body mass index is 30.13 kg/m² as calculated from the following:    Height as of this encounter: 157.5 cm (62.01\").    Weight as of this encounter: 74.8 kg (164 lb 12.8 oz).    BMI is >= 30 and <35. (Class 1 Obesity). The following options were offered after discussion;: nutrition counseling/recommendations      Does the patient have evidence of cognitive impairment?---> yes, and we will continue current meds as written.    Lab Results   Component Value Date    TRIG 100 2023    HDL 59 2023    LDL 95 2023    VLDL 18 2023    HGBA1C 7.10 (H) 2023          HEALTH RISK ASSESSMENT    Smoking Status:  Social History     Tobacco Use   Smoking Status Never   Smokeless Tobacco Never     Alcohol Consumption:  Social History     Substance and Sexual Activity   Alcohol Use Never     Fall Risk Screen:    JONAHADI Fall Risk Assessment was completed, and patient is at LOW risk for falls.Assessment completed on:2023    Depression Screenin/24/2023     4:45 PM   PHQ-2/PHQ-9 Depression Screening   Little Interest or Pleasure in Doing Things 0-->not at all   Feeling Down, Depressed or Hopeless 0-->not at all   PHQ-9: Brief Depression Severity Measure Score 0       Health Habits and Functional and Cognitive Screenin/24/2023     4:00 PM   Functional & Cognitive Status   Do you have difficulty preparing food and eating? No   Do you have difficulty bathing yourself, getting dressed or grooming yourself? No   Do you have difficulty using the toilet? No   Do you have difficulty moving around from place to place? No   Do you have trouble with steps or getting out of a bed or a chair? No   Current Diet Well Balanced Diet   Do you need help using the phone?  No   Are you deaf or do you have serious difficulty hearing?  No   Do you need help with transportation? Yes   Do you need help shopping? No   Do you need help preparing meals?  No   Do you need help " with housework?  No   Do you need help with laundry? No   Do you need help taking your medications? No   Do you need help managing money? No   Do you ever drive or ride in a car without wearing a seat belt? No   Do you have difficulty concentrating, remembering or making decisions? No       Age-appropriate Screening Schedule:  Refer to the list below for future screening recommendations based on patient's age, sex and/or medical conditions. Orders for these recommended tests are listed in the plan section. The patient has been provided with a written plan.    Health Maintenance   Topic Date Due   • DXA SCAN  Never done   • Pneumococcal Vaccine 65+ (1 - PCV) Never done   • TDAP/TD VACCINES (1 - Tdap) Never done   • ZOSTER VACCINE (1 of 2) Never done   • ANNUAL WELLNESS VISIT  Never done   • DIABETIC EYE EXAM  Never done   • COVID-19 Vaccine (3 - Booster for Pfizer series) 06/05/2021   • URINE MICROALBUMIN  02/17/2023   • INFLUENZA VACCINE  08/01/2023   • HEMOGLOBIN A1C  11/12/2023   • LIPID PANEL  05/12/2024   • HEPATITIS C SCREENING  Completed                  CMS Preventative Services Quick Reference  Risk Factors Identified During Encounter:    None Identified    The above risks/problems have been discussed with the patient.  Pertinent information has been shared with the patient in the After Visit Summary.    Diagnoses and all orders for this visit:    1. Medicare annual wellness visit, subsequent (Primary)  Assessment & Plan:  AWV completed 5/23.  Patient remains active and fairly independent, although she no longer drives.  Patient no falls and no hospitalizations the past year.  Advanced directive care packet given.  Lives with son, has 5 kids total.        2. Type 2 diabetes mellitus without complication, without long-term current use of insulin  Assessment & Plan:  A1c is down from 7.5 to 7.1 as of her 5/23 office visit.  She had trended up some after we discontinued metformin, and then it was higher after  her recent hospitalization.  She was on low-dose glipizide 5 mg just 1 daily for a few weeks after that hospitalization, which has been off it now for at least a month and obviously that A1c looks great.  Could entertain utilizing this in the near future, and/or Januvia, since she is able to get Xarelto covered.    Orders:  -     Microalbumin / Creatinine Urine Ratio - Urine, Clean Catch; Future    3. Stage 3b chronic kidney disease  Assessment & Plan:  GFR is stable at 48 as of 5/23.  No concerning electrolyte abnormalities identified.  Potassium is 4.8 now, so we need to discontinue the oral dosing.      4. Primary hypertension  Assessment & Plan:  Blood pressure well controlled as of her 5/23 office visit.  She will continue with moderate dose Cardizem only which is on board for her A-fib.      5. Iron deficiency anemia secondary to inadequate dietary iron intake  Assessment & Plan:  Hemoglobin is up from 11.6 to 13.6 as of her 5/23 office visit.  She is on twice daily oral iron, hemoglobin obviously well normal, but her iron is at the low end of normal and she has required IV iron replacement in the past.  I think we will drop her to just once daily until return to office.      6. Major neurocognitive disorder  Assessment & Plan:  As noted, stable on current meds as written.      7. Age-related osteoporosis without current pathological fracture  Assessment & Plan:  Patient stable on monthly Actonel, will continue same and put order in for BMD again.    Orders:  -     DEXA Bone Density Axial; Future    Other orders  -     ferrous sulfate (FeroSul) 325 (65 FE) MG tablet; Take 1 tablet by mouth Daily.  Dispense: 90 tablet; Refill: 1      Follow Up:   Next Medicare Wellness visit to be scheduled in 1 year.      An After Visit Summary and PPPS were made available to the patient.

## 2023-05-24 NOTE — ASSESSMENT & PLAN NOTE
GFR is stable at 48 as of 5/23.  No concerning electrolyte abnormalities identified.  Potassium is 4.8 now, so we need to discontinue the oral dosing.

## 2023-05-24 NOTE — ASSESSMENT & PLAN NOTE
Hemoglobin is up from 11.6 to 13.6 as of her 5/23 office visit.  She is on twice daily oral iron, hemoglobin obviously well normal, but her iron is at the low end of normal and she has required IV iron replacement in the past.  I think we will drop her to just once daily until return to office.

## 2023-05-24 NOTE — ASSESSMENT & PLAN NOTE
AWV completed 5/23.  Patient remains active and fairly independent, although she no longer drives.  Patient no falls and no hospitalizations the past year.  Advanced directive care packet given.  Lives with son, has 5 kids total.

## 2023-05-24 NOTE — PATIENT INSTRUCTIONS
1.  Please put the potassium pills to the side, your level is well normal presently.    2.  Lower the iron pill and vitamin C to once daily.    3.  Get the urinalysis before too long please.    4.  And as discussed, you will hear from the radiology department in regards to rescheduling the bone density.

## 2023-05-24 NOTE — ASSESSMENT & PLAN NOTE
LDL 95 remains at goal for primary prevention as of 5/23.  She stable to continue with moderate dose Crestor.

## 2023-05-24 NOTE — ASSESSMENT & PLAN NOTE
A1c is down from 7.5 to 7.1 as of her 5/23 office visit.  She had trended up some after we discontinued metformin, and then it was higher after her recent hospitalization.  She was on low-dose glipizide 5 mg just 1 daily for a few weeks after that hospitalization, which has been off it now for at least a month and obviously that A1c looks great.  Could entertain utilizing this in the near future, and/or Januvia, since she is able to get Xarelto covered.

## 2023-05-24 NOTE — PROGRESS NOTES
"Chief Complaint  Diabetes and Follow-up (Pt states that this is routine, she had labs and CXR. She doesn't have any new issues. )    Subjective      Hilary Xie presents to [facility]    Hyperlipidemia  Pertinent negatives include no chest pain or shortness of breath.   Insomnia  Pertinent negatives include no abdominal pain, arthralgias, chest pain, congestion, coughing, fatigue or fever.     History of present illness:  Patient pleasant 78-year-old female with underlying PAF, DM, osteoporosis on treatment, among others, seen 2/22 as a New Pt, and who is coming in 5/23 for routine 3-4-month follow-up.  We will review all her routine medications, go over her labs in detail, and address any new concerns as time permits.    ---> seen 3/23 for TCM:  COVID-19 pneumonia/multifocal pneumonia  Acute hypoxemic respiratory failure, increased work of breathing, satting 86% on room air  Atrial fibrillation on anticoagulation  Hyperglycemia =   I discussed with the patient that I did not want to send her home with insulin so we would trial glipizide 5 mg twice daily    Review of Systems   Constitutional: Negative for appetite change, fatigue and fever.   HENT: Negative for congestion and ear pain.    Eyes: Negative for blurred vision.   Respiratory: Negative for cough, chest tightness, shortness of breath and wheezing.    Cardiovascular: Negative for chest pain, palpitations and leg swelling.   Gastrointestinal: Negative for abdominal pain.   Genitourinary: Negative for difficulty urinating, dysuria and hematuria.   Musculoskeletal: Negative for arthralgias and gait problem.   Skin: Negative for skin lesions.   Neurological: Negative for syncope, memory problem and confusion.   Psychiatric/Behavioral: Negative for self-injury and depressed mood. The patient has insomnia.        Objective   Vital Signs:   /68   Pulse 116   Temp 96.2 °F (35.7 °C) (Skin)   Ht 157.5 cm (62.01\")   Wt " 74.8 kg (164 lb 12.8 oz)   SpO2 97%   BMI 30.13 kg/m²           Physical Exam  Vitals and nursing note reviewed.   Constitutional:       General: She is not in acute distress.     Appearance: Normal appearance. She is not toxic-appearing.   HENT:      Head: Atraumatic.      Right Ear: External ear normal.      Left Ear: External ear normal.      Nose: Nose normal.      Mouth/Throat:      Mouth: Mucous membranes are moist.   Eyes:      General:         Right eye: No discharge.         Left eye: No discharge.      Extraocular Movements: Extraocular movements intact.      Pupils: Pupils are equal, round, and reactive to light.   Cardiovascular:      Rate and Rhythm: Normal rate. Rhythm irregular.      Pulses: Normal pulses.      Heart sounds: Normal heart sounds. No murmur heard.    No gallop.      Comments: Heart tones irregularly irregular, no S3.  Pulmonary:      Effort: Pulmonary effort is normal. No respiratory distress.      Breath sounds: No wheezing, rhonchi or rales.      Comments: Lung fields clear bilaterally.  Abdominal:      General: There is no distension.      Palpations: Abdomen is soft. There is no mass.      Tenderness: There is no abdominal tenderness. There is no guarding.      Comments: No epigastric tenderness.   Musculoskeletal:         General: No swelling or tenderness.      Cervical back: No tenderness.      Right lower leg: No edema.      Left lower leg: No edema.   Skin:     General: Skin is warm and dry.      Findings: No rash.   Neurological:      General: No focal deficit present.      Mental Status: She is alert and oriented to person, place, and time. Mental status is at baseline.      Motor: No weakness.      Gait: Gait normal.   Psychiatric:         Mood and Affect: Mood normal.         Thought Content: Thought content normal.          Result Review   The following data was reviewed by: Clarence Martínez MD on 02/14/2022:  [x] Laboratory  [] Microbiology  [] Radiology  []  EKG/telemetry  [] Cardiology/Vascular  [] Pathology  [x] Old records             Assessment and Plan   Diagnoses and all orders for this visit:    1. Medicare annual wellness visit, subsequent (Primary)  Assessment & Plan:  AWV completed 5/23.  Patient remains active and fairly independent, although she no longer drives.  Patient no falls and no hospitalizations the past year.  Advanced directive care packet given.  Lives with son, has 5 kids total.        2. Type 2 diabetes mellitus without complication, without long-term current use of insulin  Assessment & Plan:  A1c is down from 7.5 to 7.1 as of her 5/23 office visit.  She had trended up some after we discontinued metformin, and then it was higher after her recent hospitalization.  She was on low-dose glipizide 5 mg just 1 daily for a few weeks after that hospitalization, which has been off it now for at least a month and obviously that A1c looks great.  Could entertain utilizing this in the near future, and/or Januvia, since she is able to get Xarelto covered.    Orders:  -     Microalbumin / Creatinine Urine Ratio - Urine, Clean Catch; Future  -     Hemoglobin A1c; Future    3. Stage 3b chronic kidney disease  Assessment & Plan:  GFR is stable at 48 as of 5/23.  No concerning electrolyte abnormalities identified.  Potassium is 4.8 now, so we need to discontinue the oral dosing.    Orders:  -     Basic Metabolic Panel; Future    4. Primary hypertension  Assessment & Plan:  Blood pressure well controlled as of her 5/23 office visit.  She will continue with moderate dose Cardizem only which is on board for her A-fib.      5. Iron deficiency anemia secondary to inadequate dietary iron intake  Overview:  EGD 2021 with no significant findings reported.  COLON 2019 was negative = no more.    Assessment & Plan:  Hemoglobin is up from 11.6 to 13.6 as of her 5/23 office visit.  She is on twice daily oral iron, hemoglobin obviously well normal, but her iron is at the low  end of normal and she has required IV iron replacement in the past.  I think we will drop her to just once daily until return to office.    Orders:  -     CBC & Differential; Future  -     Iron Profile; Future  -     Ferritin; Future    6. Major neurocognitive disorder  Overview:  Dewayne Kong MD  = note from 10/21 was reviewed.    Assessment & Plan:  As noted, stable on current meds as written.      7. Age-related osteoporosis without current pathological fracture  Assessment & Plan:  Patient stable on monthly Actonel, will continue same and put order in for BMD again.    Orders:  -     DEXA Bone Density Axial; Future    Other orders  -     ferrous sulfate (FeroSul) 325 (65 FE) MG tablet; Take 1 tablet by mouth Daily.  Dispense: 90 tablet; Refill: 1  -     Memantine HCl-Donepezil HCl (Namzaric) 14-10 MG capsule sustained-release 24 hr; Take 1 capsule by mouth Every Evening.  Dispense: 90 capsule; Refill: 1           Follow Up   Return in about 3 months (around 8/24/2023).  Patient was given instructions and counseling regarding her condition or for health maintenance advice. Please see specific information pulled into the AVS if appropriate.

## 2023-07-10 LAB
ALBUMIN UR-MCNC: 1.6 MG/DL
CREAT UR-MCNC: 103 MG/DL
MICROALBUMIN/CREAT UR: 15.5 MG/G

## 2023-08-08 ENCOUNTER — HOSPITAL ENCOUNTER (OUTPATIENT)
Dept: BONE DENSITY | Facility: HOSPITAL | Age: 78
Discharge: HOME | End: 2023-08-08
Admitting: INTERNAL MEDICINE
Payer: MEDICARE

## 2023-08-08 DIAGNOSIS — M81.0 AGE-RELATED OSTEOPOROSIS WITHOUT CURRENT PATHOLOGICAL FRACTURE: ICD-10-CM

## 2023-08-08 PROCEDURE — 77080 DXA BONE DENSITY AXIAL: CPT

## 2023-08-15 ENCOUNTER — APPOINTMENT (OUTPATIENT)
Dept: CT IMAGING | Facility: HOSPITAL | Age: 78
End: 2023-08-15
Payer: MEDICARE

## 2023-08-15 ENCOUNTER — HOSPITAL ENCOUNTER (EMERGENCY)
Facility: HOSPITAL | Age: 78
Discharge: HOME | End: 2023-08-15
Attending: EMERGENCY MEDICINE | Admitting: EMERGENCY MEDICINE
Payer: MEDICARE

## 2023-08-15 ENCOUNTER — APPOINTMENT (OUTPATIENT)
Dept: GENERAL RADIOLOGY | Facility: HOSPITAL | Age: 78
End: 2023-08-15
Payer: MEDICARE

## 2023-08-15 VITALS
RESPIRATION RATE: 18 BRPM | DIASTOLIC BLOOD PRESSURE: 82 MMHG | SYSTOLIC BLOOD PRESSURE: 108 MMHG | WEIGHT: 176.15 LBS | TEMPERATURE: 98.2 F | BODY MASS INDEX: 32.42 KG/M2 | HEART RATE: 105 BPM | HEIGHT: 62 IN | OXYGEN SATURATION: 95 %
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
## 2023-08-15 DIAGNOSIS — S00.83XA CONTUSION OF FOREHEAD, INITIAL ENCOUNTER: ICD-10-CM

## 2023-08-15 PROCEDURE — 72125 CT NECK SPINE W/O DYE: CPT

## 2023-08-15 PROCEDURE — 73110 X-RAY EXAM OF WRIST: CPT

## 2023-08-15 PROCEDURE — 70450 CT HEAD/BRAIN W/O DYE: CPT

## 2023-08-15 PROCEDURE — 99284 EMERGENCY DEPT VISIT MOD MDM: CPT

## 2023-08-15 NOTE — ED PROVIDER NOTES
Time: 5:55 PM EDT  Date of encounter:  8/15/2023  Independent Historian/Clinical History and Information was obtained by:   Patient    History is limited by: N/A    Chief Complaint   Patient presents with    Fall     Pt fell at home. Pt was bent over getting a tote bag. Pt leg gave out when she bent over. Pt head hit concrete floor. Pt denies LOC or N/V. Denies SOA or chest pain.         History of Present Illness:  Patient is a 78 y.o. year old female who presents to the emergency department for evaluation of fall that occurred prior to ED arrival.  Patient states that she was bending over to  something whenever she lost balance and fell forward.  Patient states she try to catch herself with her arms however ended up hitting her head on concrete floor.  Patient admits to left forehead swelling and abrasion.  Patient denies laceration.  Patient denies loss of consciousness, nausea, vomiting.  Patient denies altered mental status or confusion.  Patient denies chest pain or shortness of breath.  Patient states that she was able to stand up after the incident occurred but states that she did not trial ambulation.  Patient admits to left wrist pain but denies any other upper or lower extremity pain.  Patient denies decreased range of motion of neck or neck pain.  Patient denies headache, numbness or tingling.  Patient has been on blood thinners.    HPI    Patient Care Team  Primary Care Provider: Clarence Martínez MD    Past Medical History:     Allergies   Allergen Reactions    Donepezil Nausea Only    Metformin Nausea And Vomiting    Sulfa Antibiotics Hives     Past Medical History:   Diagnosis Date    Atrial fibrillation     Hyperlipidemia     Hypertension      Past Surgical History:   Procedure Laterality Date    SPINE SURGERY       Family History   Problem Relation Age of Onset    Cancer Mother     Diabetes Mother     Cancer Father     Heart failure Brother        Home Medications:  Prior to Admission  medications    Medication Sig Start Date End Date Taking? Authorizing Provider   albuterol sulfate  (90 Base) MCG/ACT inhaler Inhale 2 puffs Every 4 (Four) Hours As Needed for Wheezing or Shortness of Air. 3/11/23   Miguel Angel Gusman MD   Cholecalciferol (Vitamin D3) 50 MCG (2000 UT) capsule TAKE 1 CAPSULE BY MOUTH DAILY 2/23/23   Clarence Martínez MD   dilTIAZem CD (CARDIZEM CD) 180 MG 24 hr capsule TAKE 1 CAPSULE BY MOUTH EVERY DAY 4/17/23   Clarence Martínez MD   ferrous sulfate (FeroSul) 325 (65 FE) MG tablet Take 1 tablet by mouth Daily. 5/24/23   Clarence Martínez MD   loratadine (CLARITIN) 10 MG tablet TAKE 1 TABLET BY MOUTH EVERY DAY  Patient taking differently: Take 1 tablet by mouth Daily. 9/27/22   Clarence Martínez MD   Memantine HCl-Donepezil HCl (Namzaric) 14-10 MG capsule sustained-release 24 hr Take 1 capsule by mouth Every Evening. 5/24/23   Clarence Martínez MD   OLANZapine (zyPREXA) 5 MG tablet TAKE 1 TABLET BY MOUTH DAILY. 4/17/23   Clarence Martínez MD   risedronate (ACTONEL) 150 MG tablet TAKE 1 TABLET BY MOUTH ONCE EVERY 30 DAYS 4/27/23   Clarence Martínez MD   rivaroxaban (XARELTO) 20 MG tablet Take 1 tablet by mouth Daily With Dinner. 4/17/23   Clarence Martínez MD   rosuvastatin (CRESTOR) 20 MG tablet Take 1 tablet by mouth Daily. 2/15/23   Clarence Martínez MD   sertraline (ZOLOFT) 100 MG tablet Take 1.5 tablets by mouth Daily. 2/15/23   Clarence Martínez MD   traZODone (DESYREL) 100 MG tablet Take 1 tablet by mouth Every Night. TAKE 1 & 1/2 TABLETS BY MOUTH EVERY EVENING, MAY INCREASE TO 2 TABLETS IF NEEDED 2/15/23   Clarence Martínez MD        Social History:   Social History     Tobacco Use    Smoking status: Never    Smokeless tobacco: Never   Vaping Use    Vaping Use: Never used   Substance Use Topics    Alcohol use: Never    Drug use: Never         Review of Systems:  Review of Systems   Respiratory:  Negative for shortness of breath.    Cardiovascular:  Negative for chest pain.   Gastrointestinal:   "Negative for abdominal pain, nausea and vomiting.   Musculoskeletal:  Positive for arthralgias. Negative for neck pain and neck stiffness.   Skin:  Positive for wound.   Neurological:  Negative for syncope, numbness and headaches.      Physical Exam:  /82   Pulse 105   Temp 98.2 øF (36.8 øC)   Resp 18   Ht 157.5 cm (62\")   Wt 79.9 kg (176 lb 2.4 oz)   SpO2 95%   BMI 32.22 kg/mý         Physical Exam  Vitals and nursing note reviewed.   Constitutional:       General: She is not in acute distress.     Appearance: Normal appearance. She is not ill-appearing, toxic-appearing or diaphoretic.   HENT:      Head: Normocephalic and atraumatic. Abrasion and contusion present. No raccoon eyes, Lucas's sign, right periorbital erythema, left periorbital erythema or laceration.      Jaw: No tenderness, swelling or pain on movement.        Nose: Nose normal.      Mouth/Throat:      Mouth: Mucous membranes are moist.   Eyes:      Extraocular Movements: Extraocular movements intact.      Pupils: Pupils are equal, round, and reactive to light.   Cardiovascular:      Rate and Rhythm: Normal rate and regular rhythm.      Heart sounds: Normal heart sounds.   Pulmonary:      Effort: Pulmonary effort is normal. No respiratory distress.      Breath sounds: Normal breath sounds. No wheezing.   Chest:      Chest wall: No tenderness.   Abdominal:      General: Abdomen is flat. Bowel sounds are normal. There is no distension.      Palpations: Abdomen is soft.      Tenderness: There is no abdominal tenderness. There is no right CVA tenderness, left CVA tenderness or guarding.   Musculoskeletal:         General: Normal range of motion.      Cervical back: Normal range of motion and neck supple.      Right lower leg: No edema.      Left lower leg: No edema.   Skin:     General: Skin is warm and dry.      Coloration: Skin is not jaundiced or pale.   Neurological:      General: No focal deficit present.      Mental Status: She is " alert and oriented to person, place, and time.      GCS: GCS eye subscore is 4. GCS verbal subscore is 5. GCS motor subscore is 6.      Cranial Nerves: Cranial nerves 2-12 are intact. No cranial nerve deficit, dysarthria or facial asymmetry.      Motor: No weakness or seizure activity.      Coordination: Finger-Nose-Finger Test normal.   Psychiatric:         Mood and Affect: Mood normal.         Behavior: Behavior normal.      .              Procedures:  Procedures      Medical Decision Making:      Comorbidities that affect care:    A-fib, hypertension, hyperlipidemia    External Notes reviewed:    Previous Clinic Note: Patient seen at office on 5/24/2023 for annual      The following orders were placed and all results were independently analyzed by me:  Orders Placed This Encounter   Procedures    CT Head Without Contrast    CT Cervical Spine Without Contrast    XR Wrist 3+ View Left       Medications Given in the Emergency Department:  Medications - No data to display     ED Course:    The patient was initially evaluated in the triage area where orders were placed. The patient was later dispositioned by Yusra Shabbir, PA.      The patient was advised to stay for completion of workup which includes but is not limited to communication of labs and radiological results, reassessment and plan. The patient was advised that leaving prior to disposition by a provider could result in critical findings that are not communicated to the patient.          Labs:    Lab Results (last 24 hours)       ** No results found for the last 24 hours. **             Imaging:    XR Wrist 3+ View Left    Result Date: 8/15/2023  PROCEDURE: XR WRIST 3+ VW LEFT  COMPARISON: None  INDICATIONS: Fall, left wrist pain  FINDINGS:  No acute fracture or dislocation is identified.  Chondrocalcinosis is noted in the wrist.  There is severe arthritic change at the thumb carpometacarpal joint.  There is diffuse osteopenia.        1. Diffuse osteopenia.   No acute osseous abnormality is identified of the left wrist. 2. Severe arthritic change of the thumb carpometacarpal joint.      ALEXYS LAUREANO MD       Electronically Signed and Approved By: ALEXYS LAUREANO MD on 8/15/2023 at 18:15             CT Head Without Contrast    Result Date: 8/15/2023  PROCEDURE: CT HEAD WO CONTRAST  COMPARISON:  None  INDICATIONS: Head injury, Fall, blood thinners  PROTOCOL:   Standard imaging protocol performed    RADIATION:   DLP: 889.4 mGy*cm   MA and/or KV was adjusted to minimize radiation dose.     TECHNIQUE: After obtaining the patient's consent, CT images were obtained without non-ionic intravenous contrast material.  FINDINGS:  There is left frontal scalp soft tissue swelling without underlying fracture.  No acute intracranial hemorrhage, mass, or mass effect is seen.  There is mild global volume loss.  Bilateral basal ganglia calcifications are nonspecific.  Hypodensity in the right side of the cerebellum may be due to chronic infarct.  There are atherosclerotic vascular calcifications.  Right mastoid air cells are partially opacified.  Included paranasal sinuses are clear.        1. No acute intracranial abnormality is identified. 2. Left frontal scalp soft tissue swelling without underlying fracture.     ALEXYS LAUREANO MD       Electronically Signed and Approved By: ALEXYS LAUREANO MD on 8/15/2023 at 17:42             CT Cervical Spine Without Contrast    Result Date: 8/15/2023  PROCEDURE: CT CERVICAL SPINE WO CONTRAST  COMPARISON: None  INDICATIONS: neck injury, Fall  PROTOCOL:   Standard imaging protocol performed    RADIATION:   DLP: 378.9 mGy*cm   MA and/or KV was adjusted to minimize radiation dose.     TECHNIQUE: After obtaining the patient's consent, multi-planar CT images were created without contrast material.   FINDINGS:  No acute fracture is seen.  Minimal grade 1 anterolisthesis of C2 on C3 and C3 on C4 measures 1-2 mm.  C5 and C6 vertebral bodies are fused.   There is multilevel disc space narrowing and endplate osteophyte formation.  Facet arthropathy is most severe on the left at the C3-C4 level.  No abnormal prevertebral soft tissue swelling is seen.  Included lung apices are clear.         1. No acute fracture is identified in the cervical spine. 2. Minimal grade 1 anterolisthesis of C2 on C3 and C3 on C4. 3. Multilevel mostly mild-to-moderate degenerative changes.     ALEXYS LAUREANO MD       Electronically Signed and Approved By: ALEXYS LAUREANO MD on 8/15/2023 at 17:45                Differential Diagnosis and Discussion:      Trauma:  Differential diagnosis considered but not limited to were subarachnoid hemorrhage, intracranial bleeding, pneumothorax, cardiac contusion, lung contusion, intra-abdominal bleeding, and compartment syndrome of any extremity or other significant traumatic pathology    All X-rays impressions were independently interpreted by me.  CT scan radiology impression was interpreted by me.    MDM  Number of Diagnoses or Management Options  Contusion of forehead, initial encounter  Fall, initial encounter  Diagnosis management comments: Patient reports emergency department complaining of fall prior to ED arrival.  Patient states that she was bending over to grab something and lost her balance and try to catch herself with her hands but still ended up hitting her head on the concrete.  Patient is on blood thinners for A-fib.  On physical exam patient does not have any neurodeficits.  There is contusion of left forehead with mild abrasion.  Patient CT of head negative for any acute intracranial hemorrhage or skull fractures.  Patient's CT of cervical spine negative for any acute fractures but does show degenerative changes.  Patient's left wrist x-ray negative for acute fractures dislocations.  Patient was discharged home with very strict return precautions.  Patient states understanding agreeable treatment plan.             Patient Care  Considerations:    NARCOTICS: I considered prescribing opiate pain medication as an outpatient, however pain managed without medical mention      Consultants/Shared Management Plan:    None    Social Determinants of Health:    Patient has presented with family members who are responsible, reliable and will ensure follow up care.      Disposition and Care Coordination:    Discharged: I considered escalation of care by admitting this patient for observation, however the patient has improved and is suitable and  stable for discharge.    I have explained the patient's condition, diagnoses and treatment plan based on the information available to me at this time. I have answered questions and addressed any concerns. The patient has a good  understanding of the patient's diagnosis, condition, and treatment plan as can be expected at this point. The vital signs have been stable. The patient's condition is stable and appropriate for discharge from the emergency department.      The patient will pursue further outpatient evaluation with the primary care physician or other designated or consulting physician as outlined in the discharge instructions. They are agreeable to this plan of care and follow-up instructions have been explained in detail. The patient has received these instructions in written format and have expressed an understanding of the discharge instructions. The patient is aware that any significant change in condition or worsening of symptoms should prompt an immediate return to this or the closest emergency department or call to 911.  I have explained discharge medications and the need for follow up with the patient/caretakers. This was also printed in the discharge instructions. Patient was discharged with the following medications and follow up:      Medication List      No changes were made to your prescriptions during this visit.      Clarenec Martínez MD  [address]  [address]  [place]  [address]  [phone]    In 3 days         Final diagnoses:   Fall, initial encounter   Contusion of forehead, initial encounter        ED Disposition       ED Disposition   Discharge    Condition   Stable    Comment   --               This medical record created using voice recognition software.             Shabbir, Yusra, PA  08/15/23 1942

## 2023-08-15 NOTE — DISCHARGE INSTRUCTIONS
Follow-up with primary care provider in 3 to 5 days.  Your CT scan of head, cervical spine were all within normal limits.  X-ray of  left wrist negative for any acute fracture dislocations.  There is degenerative and arthritic changes noted on cervical spine CT and x-ray of left wrist.  You may take Tylenol as needed for pain.  Return to emergency department for new or worsening symptoms such as loss of consciousness, confusion, altered mental status, profuse vomiting, chest pain, shortness of breath.

## 2023-08-18 ENCOUNTER — LAB (OUTPATIENT)
Dept: LAB | Facility: HOSPITAL | Age: 78
End: 2023-08-18
Payer: MEDICARE

## 2023-08-18 DIAGNOSIS — E11.9 TYPE 2 DIABETES MELLITUS WITHOUT COMPLICATION, WITHOUT LONG-TERM CURRENT USE OF INSULIN: ICD-10-CM

## 2023-08-18 DIAGNOSIS — D50.8 IRON DEFICIENCY ANEMIA SECONDARY TO INADEQUATE DIETARY IRON INTAKE: ICD-10-CM

## 2023-08-18 DIAGNOSIS — N18.32 STAGE 3B CHRONIC KIDNEY DISEASE: ICD-10-CM

## 2023-08-18 LAB
ANION GAP SERPL CALCULATED.3IONS-SCNC: 11 MMOL/L (ref 5–15)
BASOPHILS # BLD AUTO: 0.06 10*3/MM3 (ref 0–0.2)
BASOPHILS NFR BLD AUTO: 0.8 % (ref 0–1.5)
BUN SERPL-MCNC: 13 MG/DL (ref 8–23)
BUN/CREAT SERPL: 10.2 (ref 7–25)
CALCIUM SPEC-SCNC: 9 MG/DL (ref 8.6–10.5)
CHLORIDE SERPL-SCNC: 108 MMOL/L (ref 98–107)
CO2 SERPL-SCNC: 23 MMOL/L (ref 22–29)
CREAT SERPL-MCNC: 1.28 MG/DL (ref 0.57–1)
DEPRECATED RDW RBC AUTO: 41.7 FL (ref 37–54)
EGFRCR SERPLBLD CKD-EPI 2021: 42.7 ML/MIN/1.73
EOSINOPHIL # BLD AUTO: 0.29 10*3/MM3 (ref 0–0.4)
EOSINOPHIL NFR BLD AUTO: 3.7 % (ref 0.3–6.2)
ERYTHROCYTE [DISTWIDTH] IN BLOOD BY AUTOMATED COUNT: 13.2 % (ref 12.3–15.4)
FERRITIN SERPL-MCNC: 129 NG/ML (ref 13–150)
GLUCOSE SERPL-MCNC: 172 MG/DL (ref 65–99)
HBA1C MFR BLD: 9 % (ref 4.8–5.6)
HCT VFR BLD AUTO: 40.8 % (ref 34–46.6)
HGB BLD-MCNC: 13.8 G/DL (ref 12–15.9)
IMM GRANULOCYTES # BLD AUTO: 0.02 10*3/MM3 (ref 0–0.05)
IMM GRANULOCYTES NFR BLD AUTO: 0.3 % (ref 0–0.5)
IRON 24H UR-MRATE: 74 MCG/DL (ref 37–145)
IRON SATN MFR SERPL: 25 % (ref 20–50)
LYMPHOCYTES # BLD AUTO: 1.88 10*3/MM3 (ref 0.7–3.1)
LYMPHOCYTES NFR BLD AUTO: 23.8 % (ref 19.6–45.3)
MCH RBC QN AUTO: 29.6 PG (ref 26.6–33)
MCHC RBC AUTO-ENTMCNC: 33.8 G/DL (ref 31.5–35.7)
MCV RBC AUTO: 87.4 FL (ref 79–97)
MONOCYTES # BLD AUTO: 0.51 10*3/MM3 (ref 0.1–0.9)
MONOCYTES NFR BLD AUTO: 6.5 % (ref 5–12)
NEUTROPHILS NFR BLD AUTO: 5.13 10*3/MM3 (ref 1.7–7)
NEUTROPHILS NFR BLD AUTO: 64.9 % (ref 42.7–76)
NRBC BLD AUTO-RTO: 0 /100 WBC (ref 0–0.2)
PLATELET # BLD AUTO: 189 10*3/MM3 (ref 140–450)
PMV BLD AUTO: 12.1 FL (ref 6–12)
POTASSIUM SERPL-SCNC: 3.5 MMOL/L (ref 3.5–5.2)
RBC # BLD AUTO: 4.67 10*6/MM3 (ref 3.77–5.28)
SODIUM SERPL-SCNC: 142 MMOL/L (ref 136–145)
TIBC SERPL-MCNC: 295 MCG/DL (ref 298–536)
TRANSFERRIN SERPL-MCNC: 198 MG/DL (ref 200–360)
WBC NRBC COR # BLD: 7.89 10*3/MM3 (ref 3.4–10.8)

## 2023-08-18 PROCEDURE — 83036 HEMOGLOBIN GLYCOSYLATED A1C: CPT

## 2023-08-18 PROCEDURE — 84466 ASSAY OF TRANSFERRIN: CPT

## 2023-08-18 PROCEDURE — 83540 ASSAY OF IRON: CPT

## 2023-08-18 PROCEDURE — 82728 ASSAY OF FERRITIN: CPT

## 2023-08-18 PROCEDURE — 80048 BASIC METABOLIC PNL TOTAL CA: CPT

## 2023-08-18 PROCEDURE — 36415 COLL VENOUS BLD VENIPUNCTURE: CPT

## 2023-08-18 PROCEDURE — 85025 COMPLETE CBC W/AUTO DIFF WBC: CPT

## 2023-08-22 PROBLEM — E11.65 TYPE 2 DIABETES MELLITUS WITH HYPERGLYCEMIA, WITHOUT LONG-TERM CURRENT USE OF INSULIN: Status: ACTIVE | Noted: 2022-02-03

## 2023-08-23 ENCOUNTER — OFFICE VISIT (OUTPATIENT)
Dept: INTERNAL MEDICINE | Facility: CLINIC | Age: 78
End: 2023-08-23
Payer: MEDICARE

## 2023-08-23 VITALS
TEMPERATURE: 97.1 F | WEIGHT: 165.2 LBS | BODY MASS INDEX: 30.4 KG/M2 | DIASTOLIC BLOOD PRESSURE: 80 MMHG | OXYGEN SATURATION: 97 % | HEART RATE: 146 BPM | HEIGHT: 62 IN | SYSTOLIC BLOOD PRESSURE: 116 MMHG

## 2023-08-23 DIAGNOSIS — I48.11 LONGSTANDING PERSISTENT ATRIAL FIBRILLATION: ICD-10-CM

## 2023-08-23 DIAGNOSIS — E11.65 TYPE 2 DIABETES MELLITUS WITH HYPERGLYCEMIA, WITHOUT LONG-TERM CURRENT USE OF INSULIN: Primary | ICD-10-CM

## 2023-08-23 DIAGNOSIS — N18.32 STAGE 3B CHRONIC KIDNEY DISEASE: ICD-10-CM

## 2023-08-23 DIAGNOSIS — M25.551 PAIN OF RIGHT HIP: ICD-10-CM

## 2023-08-23 DIAGNOSIS — D50.8 IRON DEFICIENCY ANEMIA SECONDARY TO INADEQUATE DIETARY IRON INTAKE: ICD-10-CM

## 2023-08-23 DIAGNOSIS — Z00.00 WELL ADULT EXAM: ICD-10-CM

## 2023-08-23 DIAGNOSIS — E78.2 MIXED HYPERLIPIDEMIA: ICD-10-CM

## 2023-08-23 DIAGNOSIS — Z09 HOSPITAL DISCHARGE FOLLOW-UP: ICD-10-CM

## 2023-08-23 DIAGNOSIS — I10 PRIMARY HYPERTENSION: ICD-10-CM

## 2023-08-23 PROCEDURE — 1160F RVW MEDS BY RX/DR IN RCRD: CPT | Performed by: INTERNAL MEDICINE

## 2023-08-23 PROCEDURE — 3079F DIAST BP 80-89 MM HG: CPT | Performed by: INTERNAL MEDICINE

## 2023-08-23 PROCEDURE — 1159F MED LIST DOCD IN RCRD: CPT | Performed by: INTERNAL MEDICINE

## 2023-08-23 PROCEDURE — 3074F SYST BP LT 130 MM HG: CPT | Performed by: INTERNAL MEDICINE

## 2023-08-23 PROCEDURE — 99214 OFFICE O/P EST MOD 30 MIN: CPT | Performed by: INTERNAL MEDICINE

## 2023-08-23 RX ORDER — GLIMEPIRIDE 2 MG/1
2 TABLET ORAL
Qty: 90 TABLET | Refills: 1 | Status: SHIPPED | OUTPATIENT
Start: 2023-08-23

## 2023-08-23 NOTE — ASSESSMENT & PLAN NOTE
Blood pressure stable as of 8/23 office visit.  She can continue with moderate dose Cardizem which she is basically taking for her A-fib.

## 2023-08-23 NOTE — PROGRESS NOTES
"Chief Complaint  ER follow up (Pt fell last Tuesday, she was checked out in ER. /She is going through A Fib right now her HR is elevated. )    Subjective      Hilary Xie presents to [facility]    History of Present Illness:  Patient pleasant 78-year-old female with underlying PAF, DM, osteoporosis on treatment, among others, seen 2/22 as a New Pt, and who is coming in 8/23 for routine 3-month follow-up.  We will review all her routine medications, go over her labs in detail, and address any new concerns as time permits.---> here 8/23 for ER f/u as well.    ---> seen 3/23 for TCM:  COVID-19 pneumonia/multifocal pneumonia  Acute hypoxemic respiratory failure, increased work of breathing, satting 86% on room air  Atrial fibrillation on anticoagulation  Hyperglycemia =   I discussed with the patient that I did not want to send her home with insulin so we would trial glipizide 5 mg twice daily    Review of Systems   Constitutional:  Negative for appetite change, fatigue and fever.   HENT:  Negative for congestion and ear pain.    Eyes:  Negative for blurred vision.   Respiratory:  Negative for cough, chest tightness, shortness of breath and wheezing.    Cardiovascular:  Negative for chest pain, palpitations and leg swelling.   Gastrointestinal:  Negative for abdominal pain.   Genitourinary:  Negative for difficulty urinating, dysuria and hematuria.   Musculoskeletal:  Negative for arthralgias and gait problem.   Skin:  Negative for skin lesions.   Neurological:  Negative for syncope, memory problem and confusion.   Psychiatric/Behavioral:  Negative for self-injury and depressed mood.      Objective   Vital Signs:   /80   Pulse (!) 146 Comment: she states that she is going to A.Fib right now  Temp 97.1 øF (36.2 øC) (Skin)   Ht 157.5 cm (62.01\")   Wt 74.9 kg (165 lb 3.2 oz)   SpO2 97%   BMI 30.21 kg/mý           Physical Exam  Vitals and nursing note reviewed. "   Constitutional:       General: She is not in acute distress.     Appearance: Normal appearance. She is not toxic-appearing.   HENT:      Head: Atraumatic.      Right Ear: External ear normal.      Left Ear: External ear normal.      Nose: Nose normal.      Mouth/Throat:      Mouth: Mucous membranes are moist.   Eyes:      General:         Right eye: No discharge.         Left eye: No discharge.      Extraocular Movements: Extraocular movements intact.      Pupils: Pupils are equal, round, and reactive to light.   Cardiovascular:      Rate and Rhythm: Normal rate. Rhythm irregular.      Pulses: Normal pulses.      Heart sounds: Normal heart sounds. No murmur heard.    No gallop.      Comments: Heart tones irregularly irregular, no S3.  Pulmonary:      Effort: Pulmonary effort is normal. No respiratory distress.      Breath sounds: No wheezing, rhonchi or rales.      Comments: Lung fields clear bilaterally.  Abdominal:      General: There is no distension.      Palpations: Abdomen is soft. There is no mass.      Tenderness: There is no abdominal tenderness. There is no guarding.      Comments: No epigastric tenderness.   Musculoskeletal:         General: No swelling or tenderness.      Cervical back: No tenderness.      Right lower leg: No edema.      Left lower leg: No edema.   Skin:     General: Skin is warm and dry.      Findings: No rash.   Neurological:      General: No focal deficit present.      Mental Status: She is alert and oriented to person, place, and time. Mental status is at baseline.      Motor: No weakness.      Gait: Gait normal.   Psychiatric:         Mood and Affect: Mood normal.         Thought Content: Thought content normal.        Result Review   The following data was reviewed by: Clarence Martínez MD on 02/14/2022:  [x] Laboratory  [] Microbiology  [] Radiology  [] EKG/telemetry  [] Cardiology/Vascular  [] Pathology  [x] Old records             Assessment and Plan   Diagnoses and all orders for  this visit:    1. Type 2 diabetes mellitus with hyperglycemia, without long-term current use of insulin (Primary)  Assessment & Plan:  A1c is up significantly from 7.1 to 9.0 as of her 8/23 office visit.  Her weight is stable to down over this timeframe.  I think we will get her back on low-dose sulfonylurea.    Orders:  -     Hemoglobin A1c; Future    2. Hospital discharge follow-up  Assessment & Plan:  Patient had a accidental fall as of her 8/23 office visit.  Was seen in the ER, had some trauma to the left upper face, bruising periorbitally.  No laceration per se.  She had CT of head and neck without any fractures, certainly no bleeding identified.      3. Stage 3b chronic kidney disease  Assessment & Plan:  GFR down slightly to 43 as of 8/23.  No acidosis, electrolytes are fine.    Orders:  -     Comprehensive Metabolic Panel; Future    4. Primary hypertension  Assessment & Plan:  Blood pressure stable as of 8/23 office visit.  She can continue with moderate dose Cardizem which she is basically taking for her A-fib.      5. Longstanding persistent atrial fibrillation  Overview:  Since 1980's.  Cardioverted x1.  Followed annually by Dr. Chamberlain.        6. Iron deficiency anemia secondary to inadequate dietary iron intake  Overview:  EGD 2021 with no significant findings reported.  COLON 2019 was negative = no more.    Assessment & Plan:  Hemoglobin is stable at 13.8 as of her 8/23 office visit.  She stable to continue just once daily ferrous sulfate since ferritin is not trending up.      7. Mixed hyperlipidemia  -     Lipid Panel; Future    8. Well adult exam  -     TSH; Future    9. Pain of right hip  -     XR Hip With or Without Pelvis 2 - 3 View Right; Future    Other orders  -     glimepiride (AMARYL) 2 MG tablet; Take 1 tablet by mouth Every Morning Before Breakfast.  Dispense: 90 tablet; Refill: 1  -     metoprolol tartrate (LOPRESSOR) 25 MG tablet; 1 to 2 tablets twice daily as needed for rapid heart rate   Dispense: 60 tablet; Refill: 1             Follow Up   Return in about 2 months (around 10/23/2023).  Patient was given instructions and counseling regarding her condition or for health maintenance advice. Please see specific information pulled into the AVS if appropriate.

## 2023-08-23 NOTE — ASSESSMENT & PLAN NOTE
Hemoglobin is stable at 13.8 as of her 8/23 office visit.  She stable to continue just once daily ferrous sulfate since ferritin is not trending up.

## 2023-08-23 NOTE — ASSESSMENT & PLAN NOTE
A1c is up significantly from 7.1 to 9.0 as of her 8/23 office visit.  Her weight is stable to down over this timeframe.  I think we will get her back on low-dose sulfonylurea.

## 2023-09-01 ENCOUNTER — OFFICE VISIT (OUTPATIENT)
Dept: CARDIOLOGY | Facility: CLINIC | Age: 78
End: 2023-09-01
Payer: MEDICARE

## 2023-09-01 VITALS
HEART RATE: 101 BPM | DIASTOLIC BLOOD PRESSURE: 68 MMHG | SYSTOLIC BLOOD PRESSURE: 112 MMHG | HEIGHT: 62 IN | WEIGHT: 166 LBS | BODY MASS INDEX: 30.55 KG/M2

## 2023-09-01 DIAGNOSIS — R06.09 DYSPNEA ON EXERTION: ICD-10-CM

## 2023-09-01 DIAGNOSIS — E78.2 MIXED HYPERLIPIDEMIA: ICD-10-CM

## 2023-09-01 DIAGNOSIS — I10 PRIMARY HYPERTENSION: ICD-10-CM

## 2023-09-01 DIAGNOSIS — R42 POSTURAL DIZZINESS: ICD-10-CM

## 2023-09-01 DIAGNOSIS — I48.0 PAROXYSMAL ATRIAL FIBRILLATION: Primary | ICD-10-CM

## 2023-09-01 PROCEDURE — 99214 OFFICE O/P EST MOD 30 MIN: CPT | Performed by: INTERNAL MEDICINE

## 2023-09-01 PROCEDURE — 3078F DIAST BP <80 MM HG: CPT | Performed by: INTERNAL MEDICINE

## 2023-09-01 PROCEDURE — 3074F SYST BP LT 130 MM HG: CPT | Performed by: INTERNAL MEDICINE

## 2023-09-01 RX ORDER — AMIODARONE HYDROCHLORIDE 200 MG/1
200 TABLET ORAL DAILY
Qty: 30 TABLET | Refills: 5 | Status: SHIPPED | OUTPATIENT
Start: 2023-09-01

## 2023-09-04 NOTE — PROGRESS NOTES
Chief Complaint  Atrial Fibrillation, Hypertension, and Hyperlipidemia    Subjective        Hilary Xie presents to [facility]  History of present illness:    Patient denies any palpitations or bleeding problems.  She notes no chest pain.  She does note that she has been having very low blood pressures.  She is on the diltiazem 180 daily.  She takes the metoprolol only if her heart rate is up.  She does drink about 4 Cokes a day and not much water.      Past Medical History:   Diagnosis Date    Atrial fibrillation     Hyperlipidemia     Hypertension          Past Surgical History:   Procedure Laterality Date    SPINE SURGERY            Social History     Socioeconomic History    Marital status: Single   Tobacco Use    Smoking status: Never    Smokeless tobacco: Never   Vaping Use    Vaping Use: Never used   Substance and Sexual Activity    Alcohol use: Never    Drug use: Never    Sexual activity: Defer         Family History   Problem Relation Age of Onset    Cancer Mother     Diabetes Mother     Cancer Father     Heart failure Brother           Allergies   Allergen Reactions    Donepezil Nausea Only    Metformin Nausea And Vomiting    Sulfa Antibiotics Hives            Current Outpatient Medications:     Cholecalciferol (Vitamin D3) 50 MCG (2000 UT) capsule, TAKE 1 CAPSULE BY MOUTH DAILY, Disp: 90 capsule, Rfl: 1    dilTIAZem CD (CARDIZEM CD) 180 MG 24 hr capsule, TAKE 1 CAPSULE BY MOUTH EVERY DAY, Disp: 90 capsule, Rfl: 1    ferrous sulfate (FeroSul) 325 (65 FE) MG tablet, Take 1 tablet by mouth Daily., Disp: 90 tablet, Rfl: 1    glimepiride (AMARYL) 2 MG tablet, Take 1 tablet by mouth Every Morning Before Breakfast., Disp: 90 tablet, Rfl: 1    loratadine (CLARITIN) 10 MG tablet, TAKE 1 TABLET BY MOUTH EVERY DAY (Patient taking differently: Take 1 tablet by mouth Daily.), Disp: 30 tablet, Rfl: 5    Memantine HCl-Donepezil HCl (Namzaric) 14-10 MG capsule sustained-release 24 hr, Take 1  "capsule by mouth Every Evening., Disp: 90 capsule, Rfl: 1    metoprolol tartrate (LOPRESSOR) 25 MG tablet, 1 to 2 tablets twice daily as needed for rapid heart rate (Patient taking differently: Take 1 tablet by mouth Daily. 1 to 2 tablets twice daily as needed for rapid heart rate . PT STATES TO ONLY BE TAKING 1/2 TABLET AT NIGHT .), Disp: 60 tablet, Rfl: 1    OLANZapine (zyPREXA) 5 MG tablet, TAKE 1 TABLET BY MOUTH DAILY., Disp: 90 tablet, Rfl: 1    risedronate (ACTONEL) 150 MG tablet, TAKE 1 TABLET BY MOUTH ONCE EVERY 30 DAYS, Disp: 3 tablet, Rfl: 1    rivaroxaban (XARELTO) 20 MG tablet, Take 1 tablet by mouth Daily With Dinner., Disp: 90 tablet, Rfl: 3    rosuvastatin (CRESTOR) 20 MG tablet, Take 1 tablet by mouth Daily., Disp: 90 tablet, Rfl: 1    sertraline (ZOLOFT) 100 MG tablet, Take 1.5 tablets by mouth Daily., Disp: 135 tablet, Rfl: 1    traZODone (DESYREL) 100 MG tablet, Take 1 tablet by mouth Every Night. TAKE 1 & 1/2 TABLETS BY MOUTH EVERY EVENING, MAY INCREASE TO 2 TABLETS IF NEEDED, Disp: 90 tablet, Rfl: 1    amiodarone (PACERONE) 200 MG tablet, Take 1 tablet by mouth Daily., Disp: 30 tablet, Rfl: 5      ROS:  Cardiac review of systems is positive for lightheadedness.    Objective     /68   Pulse 101   Ht 157.5 cm (62.01\")   Wt 75.3 kg (166 lb)   BMI 30.35 kg/m²       General Appearance:   well developed  well nourished  HENT:   oropharynx moist  lips not cyanotic  Respiratory:  no respiratory distress  normal breath sounds  no rales  Cardiovascular:  no jugular venous distention  regular rhythm  S1 normal, S2 normal  no S3, no S4   no murmur  no rub, no thrill  No carotid bruit  pedal pulses normal  lower extremity edema: none    Musculoskeletal:  no clubbing of fingers.   normocephalic, head atraumatic  Skin:   warm, dry  Psychiatric:  judgement and insight appropriate  normal mood and affect    ECHO:    STRESS:    CATH:  No results found for this or any previous visit.    BMP:     Glucose "   Date Value Ref Range Status   08/18/2023 172 (H) 65 - 99 mg/dL Final     BUN   Date Value Ref Range Status   08/18/2023 13 8 - 23 mg/dL Final     Creatinine   Date Value Ref Range Status   08/18/2023 1.28 (H) 0.57 - 1.00 mg/dL Final     Sodium   Date Value Ref Range Status   08/18/2023 142 136 - 145 mmol/L Final     Potassium   Date Value Ref Range Status   08/18/2023 3.5 3.5 - 5.2 mmol/L Final     Chloride   Date Value Ref Range Status   08/18/2023 108 (H) 98 - 107 mmol/L Final     CO2   Date Value Ref Range Status   08/18/2023 23.0 22.0 - 29.0 mmol/L Final     Calcium   Date Value Ref Range Status   08/18/2023 9.0 8.6 - 10.5 mg/dL Final     BUN/Creatinine Ratio   Date Value Ref Range Status   08/18/2023 10.2 7.0 - 25.0 Final     Anion Gap   Date Value Ref Range Status   08/18/2023 11.0 5.0 - 15.0 mmol/L Final     eGFR   Date Value Ref Range Status   08/18/2023 42.7 (L) >60.0 mL/min/1.73 Final     LIPIDS:  Total Cholesterol   Date Value Ref Range Status   05/12/2023 172 0 - 200 mg/dL Final     Triglycerides   Date Value Ref Range Status   05/12/2023 100 0 - 150 mg/dL Final     HDL Cholesterol   Date Value Ref Range Status   05/12/2023 59 40 - 60 mg/dL Final     LDL Cholesterol    Date Value Ref Range Status   05/12/2023 95 0 - 100 mg/dL Final     VLDL Cholesterol   Date Value Ref Range Status   05/12/2023 18 5 - 40 mg/dL Final     LDL/HDL Ratio   Date Value Ref Range Status   05/12/2023 1.58  Final         Procedures             ASSESSMENT:  Diagnoses and all orders for this visit:    1. Paroxysmal atrial fibrillation (Primary)    2. Primary hypertension    3. Mixed hyperlipidemia    4. Postural dizziness    5. Dyspnea on exertion    Other orders  -     amiodarone (PACERONE) 200 MG tablet; Take 1 tablet by mouth Daily.  Dispense: 30 tablet; Refill: 5         PLAN:    1.  I think it is going to be very difficult to control patient's rate when she is in A-fib due to her low blood pressures.  2.  I have asked the  patient to start amiodarone.  We will have her return in 2 weeks for an EKG.  If she remains in atrial fibrillation at that time we will give her the option of cardioversion.  If we are able to get her back in normal sinus rhythm at that time I would stop the diltiazem and maybe add low-dose metoprolol.  3.  We will hold the metoprolol for now.  If were not able to maintain normal sinus rhythm at that time we would have to titrate down the diltiazem and increase the metoprolol.  4.  Patient has continued to be on Xarelto long-term.  5.  Encourage cutting back on caffeine and increasing her water intake.  6.  Patient has noted dyspnea on exertion.  We will see if getting her back in a normal sinus rhythm resolves this.    Return in about 1 month (around 10/1/2023) for shabana martin.     Patient was given instructions and counseling regarding her condition or for health maintenance advice. Please see specific information pulled into the AVS if appropriate.         Elias Dougherty MD   9/4/2023  19:14 EDT

## 2023-09-14 RX ORDER — TRAZODONE HYDROCHLORIDE 100 MG/1
TABLET ORAL
Qty: 90 TABLET | Refills: 1 | Status: SHIPPED | OUTPATIENT
Start: 2023-09-14

## 2023-09-15 ENCOUNTER — OFFICE VISIT (OUTPATIENT)
Dept: CARDIOLOGY | Facility: CLINIC | Age: 78
End: 2023-09-15
Payer: MEDICARE

## 2023-09-15 DIAGNOSIS — I48.91 ATRIAL FIBRILLATION, UNSPECIFIED TYPE: Primary | ICD-10-CM

## 2023-09-15 RX ORDER — ROSUVASTATIN CALCIUM 20 MG/1
20 TABLET, COATED ORAL DAILY
Qty: 90 TABLET | Refills: 1 | Status: SHIPPED | OUTPATIENT
Start: 2023-09-15

## 2023-09-15 NOTE — TELEPHONE ENCOUNTER
Rx Refill Note  Requested Prescriptions      No prescriptions requested or ordered in this encounter      Last office visit with prescribing clinician: 8/23/2023   Last telemedicine visit with prescribing clinician: Visit date not found   Next office visit with prescribing clinician: 10/25/2023                         Would you like a call back once the refill request has been completed: [] Yes [] No    If the office needs to give you a call back, can they leave a voicemail: [] Yes [] No    Cheyenne Jim MA  09/15/23, 11:27 EDT

## 2023-09-15 NOTE — PROGRESS NOTES
ECG 12 Lead    Date/Time: 9/15/2023 12:55 PM  Performed by: Liyah Sanderson APRN  Authorized by: Liyah Sanderson APRN   Rhythm: atrial fibrillation  Rate: normal  BPM: 86  Conduction: non-specific intraventricular conduction delay  QRS axis: normal  Other findings: T wave abnormality    Clinical impression: normal ECG

## 2023-09-18 ENCOUNTER — TELEPHONE (OUTPATIENT)
Dept: CARDIOLOGY | Facility: CLINIC | Age: 78
End: 2023-09-18

## 2023-09-18 NOTE — TELEPHONE ENCOUNTER
Caller: Hilary Xie    Relationship: Self    Best call back number: [phone]     What is the best time to reach you: ANYTIME    Who are you requesting to speak with (clinical staff, provider,  specific staff member): ANY    Do you know the name of the person who called: HILARY    What was the call regarding: PATIENT STATES THAT SHE RECEIVED A CALL BUT NO RECORDS OF A CALL ARE FOUND ON CHART. IF ANYONE CALLED PATIENT, PLEASE CALL HER BACK    Is it okay if the provider responds through MyChart: NO

## 2023-10-02 ENCOUNTER — PREP FOR SURGERY (OUTPATIENT)
Dept: OTHER | Facility: HOSPITAL | Age: 78
End: 2023-10-02
Payer: MEDICARE

## 2023-10-02 ENCOUNTER — OFFICE VISIT (OUTPATIENT)
Dept: CARDIOLOGY | Facility: CLINIC | Age: 78
End: 2023-10-02
Payer: MEDICARE

## 2023-10-02 VITALS
SYSTOLIC BLOOD PRESSURE: 100 MMHG | HEIGHT: 62 IN | WEIGHT: 170.6 LBS | BODY MASS INDEX: 31.39 KG/M2 | HEART RATE: 77 BPM | DIASTOLIC BLOOD PRESSURE: 69 MMHG

## 2023-10-02 DIAGNOSIS — R06.09 DYSPNEA ON EXERTION: ICD-10-CM

## 2023-10-02 DIAGNOSIS — E78.2 MIXED HYPERLIPIDEMIA: ICD-10-CM

## 2023-10-02 DIAGNOSIS — I48.11 LONGSTANDING PERSISTENT ATRIAL FIBRILLATION: Primary | ICD-10-CM

## 2023-10-02 DIAGNOSIS — I10 PRIMARY HYPERTENSION: ICD-10-CM

## 2023-10-02 PROCEDURE — 3074F SYST BP LT 130 MM HG: CPT

## 2023-10-02 PROCEDURE — 93000 ELECTROCARDIOGRAM COMPLETE: CPT

## 2023-10-02 PROCEDURE — 1159F MED LIST DOCD IN RCRD: CPT

## 2023-10-02 PROCEDURE — 1160F RVW MEDS BY RX/DR IN RCRD: CPT

## 2023-10-02 PROCEDURE — 3078F DIAST BP <80 MM HG: CPT

## 2023-10-02 PROCEDURE — 99214 OFFICE O/P EST MOD 30 MIN: CPT

## 2023-10-02 RX ORDER — MULTIVIT WITH MINERALS/LUTEIN
500 TABLET ORAL DAILY
COMMUNITY

## 2023-10-02 NOTE — PROGRESS NOTES
Chief Complaint  Atrial Fibrillation, Hypertension, Hyperlipidemia, and Follow-up (1 mo f/u)    Subjective        History of Present Illness  Hilary Xie presents to [facility] for follow up.  Ms. Xie is a 78-year-old female with past medical history outlined below, significant for atrial fibrillation, hypertension, hyperlipidemia who presents for follow-up.  She continues to have shortness of breath with mild exertion.  She notes no chest pain or palpitations.  She has no dizziness or lightheadedness.  She remains in atrial flutter on EKG today.      Past Medical History:   Diagnosis Date    Atrial fibrillation     Hyperlipidemia     Hypertension        ALLERGY  Allergies   Allergen Reactions    Donepezil Nausea Only    Metformin Nausea And Vomiting    Sulfa Antibiotics Hives        Past Surgical History:   Procedure Laterality Date    SPINE SURGERY          Social History     Socioeconomic History    Marital status: Single   Tobacco Use    Smoking status: Never    Smokeless tobacco: Never   Vaping Use    Vaping Use: Never used   Substance and Sexual Activity    Alcohol use: Never    Drug use: Never    Sexual activity: Defer       Family History   Problem Relation Age of Onset    Cancer Mother     Diabetes Mother     Cancer Father     Heart failure Brother         Current Outpatient Medications on File Prior to Visit   Medication Sig    amiodarone (PACERONE) 200 MG tablet Take 1 tablet by mouth Daily.    Cholecalciferol (Vitamin D3) 50 MCG (2000 UT) capsule TAKE 1 CAPSULE BY MOUTH DAILY    dilTIAZem CD (CARDIZEM CD) 180 MG 24 hr capsule TAKE 1 CAPSULE BY MOUTH EVERY DAY    ferrous sulfate (FeroSul) 325 (65 FE) MG tablet Take 1 tablet by mouth Daily.    glimepiride (AMARYL) 2 MG tablet Take 1 tablet by mouth Every Morning Before Breakfast.    loratadine (CLARITIN) 10 MG tablet TAKE 1 TABLET BY MOUTH EVERY DAY (Patient taking differently: Take 1 tablet by mouth Daily As Needed.)     "OLANZapine (zyPREXA) 5 MG tablet TAKE 1 TABLET BY MOUTH DAILY.    risedronate (ACTONEL) 150 MG tablet TAKE 1 TABLET BY MOUTH ONCE EVERY 30 DAYS    rivaroxaban (XARELTO) 20 MG tablet Take 1 tablet by mouth Daily With Dinner.    rosuvastatin (CRESTOR) 20 MG tablet Take 1 tablet by mouth Daily.    sertraline (ZOLOFT) 100 MG tablet Take 1.5 tablets by mouth Daily.    traZODone (DESYREL) 100 MG tablet TAKE 1 TABLET BY MOUTH EVERY NIGHT AT BEDTIME. MAY INCREASE TO 1.5-2 TABLETS IF NEEDED    vitamin C (ASCORBIC ACID) 250 MG tablet Take 2 tablets by mouth Daily.    metoprolol tartrate (LOPRESSOR) 25 MG tablet 1 to 2 tablets twice daily as needed for rapid heart rate (Patient not taking: Reported on 10/2/2023)    [DISCONTINUED] Memantine HCl-Donepezil HCl (Namzaric) 14-10 MG capsule sustained-release 24 hr Take 1 capsule by mouth Every Evening. (Patient not taking: Reported on 10/2/2023)     No current facility-administered medications on file prior to visit.       Objective   Vitals:    10/02/23 1147   BP: 100/69   Pulse: 77   Weight: 77.4 kg (170 lb 9.6 oz)   Height: 157.5 cm (62.01\")       Physical Exam  Constitutional:       General: She is awake. She is not in acute distress.     Appearance: Normal appearance.   HENT:      Head: Normocephalic.      Nose: Nose normal. No congestion.   Eyes:      Extraocular Movements: Extraocular movements intact.      Conjunctiva/sclera: Conjunctivae normal.      Pupils: Pupils are equal, round, and reactive to light.   Neck:      Thyroid: No thyromegaly.      Vascular: No JVD.   Cardiovascular:      Rate and Rhythm: Normal rate. Rhythm irregular.      Chest Wall: PMI is not displaced.      Pulses: Normal pulses.      Heart sounds: Normal heart sounds, S1 normal and S2 normal. No murmur heard.    No friction rub. No gallop. No S3 or S4 sounds.   Pulmonary:      Effort: Pulmonary effort is normal.      Breath sounds: Normal breath sounds. No wheezing, rhonchi or rales.   Abdominal:      " General: Bowel sounds are normal.      Palpations: Abdomen is soft.      Tenderness: There is no abdominal tenderness.   Musculoskeletal:      Cervical back: No tenderness.      Right lower leg: No edema.      Left lower leg: No edema.   Lymphadenopathy:      Cervical: No cervical adenopathy.   Skin:     General: Skin is warm and dry.      Capillary Refill: Capillary refill takes less than 2 seconds.      Coloration: Skin is not cyanotic.      Findings: No petechiae or rash.      Nails: There is no clubbing.   Neurological:      Mental Status: She is alert.   Psychiatric:         Mood and Affect: Mood normal.         Behavior: Behavior is cooperative.         Result Review     The following data was reviewed by ALESSIA Trevizo on 10/02/23.    proBNP   Date Value Ref Range Status   03/08/2023 566.2 0.0 - 1,800.0 pg/mL Final     CMP          3/11/2023    04:28 5/12/2023    08:40 8/18/2023    13:06   CMP   Glucose 291  195  172    BUN 24  19  13    Creatinine 0.99  1.16  1.28    EGFR 58.5  48.4  42.7    Sodium 140  142  142    Potassium 3.7  4.8  3.5    Chloride 110  107  108    Calcium 8.5  10.0  9.0    Total Protein 5.5  6.4     Albumin 3.1  3.8     Globulin 2.4  2.6     Total Bilirubin 0.3  0.6     Alkaline Phosphatase 57  68     AST (SGOT) 14  14     ALT (SGPT) 11  13     Albumin/Globulin Ratio 1.3  1.5     BUN/Creatinine Ratio 24.2  16.4  10.2    Anion Gap 8.3  11.9  11.0      CBC w/diff          3/11/2023    04:28 5/12/2023    08:40 8/18/2023    13:06   CBC w/Diff   WBC 3.85  6.51  7.89    RBC 3.88  4.72  4.67    Hemoglobin 11.5  13.6  13.8    Hematocrit 33.5  41.8  40.8    MCV 86.3  88.6  87.4    MCH 29.6  28.8  29.6    MCHC 34.3  32.5  33.8    RDW 13.5  12.7  13.2    Platelets 81  202  189    Neutrophil Rel % 84.7  57.5  64.9    Immature Granulocyte Rel % 0.5  0.3  0.3    Lymphocyte Rel % 8.3  30.4  23.8    Monocyte Rel % 6.5  6.1  6.5    Eosinophil Rel % 0.0  4.6  3.7    Basophil Rel % 0.0  1.1  0.8        Lipid Panel          11/8/2022    12:10 3/8/2023    14:29 5/12/2023    08:40   Lipid Panel   Total Cholesterol 173  129  172    Triglycerides 92  136  100    HDL Cholesterol 67  44  59    VLDL Cholesterol 17  24  18    LDL Cholesterol  89  61  95    LDL/HDL Ratio 1.31  1.31  1.58                   ECG 12 Lead    Date/Time: 10/2/2023 12:38 PM  Performed by: Liyah Sanderson APRN  Authorized by: Liyah Sanderson APRN   Comparison: compared with previous ECG   Comparison to previous ECG: Previously atrial fibrillation  Rhythm: atrial flutter  Rate: normal  BPM: 105  QRS axis: normal    Clinical impression: abnormal EKG  Comments: PVC, left axis deviation, abnormal R wave progression        Assessment & Plan  Diagnoses and all orders for this visit:    1. Longstanding persistent atrial fibrillation (Primary)    2. Primary hypertension    3. Mixed hyperlipidemia    4. Dyspnea on exertion    Other orders  -     ECG 12 Lead    1.  Currently in atrial flutter, rate 105.  On amiodarone and diltiazem.  She will be scheduled for cardioversion with Dr. Dougherty on 10/4/2023.  Discussed with the patient and she was agreeable to proceed.  If she is able to maintain sinus rhythm after cardioversion will discontinue diltiazem and resume metoprolol.  2.  Blood pressure is well controlled.  Continue current regimen.  3.  Continue rosuvastatin 20 mg daily, most recent lipid panel demonstrates total cholesterol 172, HDL 59, LDL 95 and triglycerides 100.  4.  She continues with dyspnea with mild exertion.  She will be scheduled for cardioversion with Dr. Dougherty as discussed above.  If her symptoms do not improve we will consider stress testing.  Plan to check an echocardiogram to rule out any structural or valvular abnormalities and assess LVEF.    Follow Up   No follow-ups on file.    Patient was given instructions and counseling regarding her condition or for health maintenance advice. Please see specific  information pulled into the AVS if appropriate.     Liyah Sanderson, APRN  10/02/23  12:37 EDT    Dictated Utilizing Dragon Dictation

## 2023-10-03 ENCOUNTER — TELEPHONE (OUTPATIENT)
Dept: CARDIOLOGY | Facility: CLINIC | Age: 78
End: 2023-10-03
Payer: MEDICARE

## 2023-10-03 NOTE — TELEPHONE ENCOUNTER
Caller: Anne-MarieHilary    Relationship: Self    Best call back number: [phone]    What is the best time to reach you: ANY    Who are you requesting to speak with (clinical staff, provider,  specific staff member): CLINICAL    What was the call regarding: PATIENT WAS TOLD YESTERDAY AT HER APPOINTMENT SHE WOULD RECEIVE A CALL TODAY ABOUT SCHEDULING HER CARDIOVERSION BUT HAS NOT RECEIVED A CALL AND WAS WONDERING WHEN SHE WOULD FOR SCHEDULING. PLEASE GIVE HER A CALL BACK WHEN ABLE, THANK YOU!    Is it okay if the provider responds through [employer]: PLEASE CALL

## 2023-10-04 ENCOUNTER — TELEPHONE (OUTPATIENT)
Dept: CARDIOLOGY | Facility: CLINIC | Age: 78
End: 2023-10-04
Payer: MEDICARE

## 2023-10-04 NOTE — TELEPHONE ENCOUNTER
Name: Hilary Xie  Relationship: Self  Best Callback Number: [phone]  HUB PROVIDED THE RELAY MESSAGE FROM THE OFFICE   PATIENT VOICED UNDERSTANDING AND HAS NO FURTHER QUESTIONS AT THIS TIME

## 2023-10-04 NOTE — TELEPHONE ENCOUNTER
Called pt and left a detailed message stating it may take a few days for the hospital to schedule the cardioversion.  Per Liyah, the pt was also ordered an ECHO after the pt left the visit.  She asked that I call the pt to let her know of this new order.  Again, left message about it also.    Advised pt to call our office if she still had questions or concerns.

## 2023-10-04 NOTE — TELEPHONE ENCOUNTER
I spoke to patient and gave an arrival time of 7:30 on 10/06/23 for Cardioversion. Patient was instructed to have a  for the day of the procedure and to arrive at the main entrance/registration area. Patient was instructed to continue all medications as usual. Patient was instructed to be NPO after midnight with sips of water as needed. Patient is agreeable with no other questions or concerns.

## 2023-10-06 ENCOUNTER — LAB (OUTPATIENT)
Dept: LAB | Facility: HOSPITAL | Age: 78
End: 2023-10-06
Payer: MEDICARE

## 2023-10-06 ENCOUNTER — HOSPITAL ENCOUNTER (OUTPATIENT)
Dept: CARDIOLOGY | Facility: HOSPITAL | Age: 78
Discharge: HOME | End: 2023-10-06
Payer: MEDICARE

## 2023-10-06 ENCOUNTER — TELEPHONE (OUTPATIENT)
Dept: CARDIOLOGY | Facility: CLINIC | Age: 78
End: 2023-10-06

## 2023-10-06 VITALS
WEIGHT: 165 LBS | DIASTOLIC BLOOD PRESSURE: 59 MMHG | BODY MASS INDEX: 30.36 KG/M2 | OXYGEN SATURATION: 94 % | HEART RATE: 71 BPM | SYSTOLIC BLOOD PRESSURE: 109 MMHG | HEIGHT: 62 IN | RESPIRATION RATE: 20 BRPM

## 2023-10-06 DIAGNOSIS — E11.65 TYPE 2 DIABETES MELLITUS WITH HYPERGLYCEMIA, WITHOUT LONG-TERM CURRENT USE OF INSULIN: ICD-10-CM

## 2023-10-06 DIAGNOSIS — N18.32 STAGE 3B CHRONIC KIDNEY DISEASE: ICD-10-CM

## 2023-10-06 DIAGNOSIS — I48.11 LONGSTANDING PERSISTENT ATRIAL FIBRILLATION: ICD-10-CM

## 2023-10-06 DIAGNOSIS — Z00.00 WELL ADULT EXAM: ICD-10-CM

## 2023-10-06 DIAGNOSIS — E78.2 MIXED HYPERLIPIDEMIA: ICD-10-CM

## 2023-10-06 PROBLEM — I48.91 ATRIAL FIBRILLATION: Status: ACTIVE | Noted: 2023-10-06

## 2023-10-06 LAB
ALBUMIN SERPL-MCNC: 4 G/DL (ref 3.5–5.2)
ALBUMIN/GLOB SERPL: 1.5 G/DL
ALP SERPL-CCNC: 67 U/L (ref 39–117)
ALT SERPL W P-5'-P-CCNC: 15 U/L (ref 1–33)
ANION GAP SERPL CALCULATED.3IONS-SCNC: 0.8 MMOL/L (ref 5–15)
AST SERPL-CCNC: 16 U/L (ref 1–32)
BILIRUB SERPL-MCNC: 0.5 MG/DL (ref 0–1.2)
BUN SERPL-MCNC: 20 MG/DL (ref 8–23)
BUN/CREAT SERPL: 14.3 (ref 7–25)
CALCIUM SPEC-SCNC: 9.7 MG/DL (ref 8.6–10.5)
CHLORIDE SERPL-SCNC: 109 MMOL/L (ref 98–107)
CHOLEST SERPL-MCNC: 164 MG/DL (ref 0–200)
CO2 SERPL-SCNC: 34.2 MMOL/L (ref 22–29)
CREAT SERPL-MCNC: 1.4 MG/DL (ref 0.57–1)
EGFRCR SERPLBLD CKD-EPI 2021: 38.3 ML/MIN/1.73
GLOBULIN UR ELPH-MCNC: 2.7 GM/DL
GLUCOSE SERPL-MCNC: 130 MG/DL (ref 65–99)
HBA1C MFR BLD: 6.8 % (ref 4.8–5.6)
HDLC SERPL-MCNC: 61 MG/DL (ref 40–60)
INR PPP: 2.44 (ref 0.86–1.15)
LDLC SERPL CALC-MCNC: 89 MG/DL (ref 0–100)
LDLC/HDLC SERPL: 1.44 {RATIO}
POTASSIUM SERPL-SCNC: 4.3 MMOL/L (ref 3.5–5.2)
PROT SERPL-MCNC: 6.7 G/DL (ref 6–8.5)
PROTHROMBIN TIME: 26.1 SECONDS (ref 11.8–14.9)
SODIUM SERPL-SCNC: 144 MMOL/L (ref 136–145)
TRIGL SERPL-MCNC: 76 MG/DL (ref 0–150)
TSH SERPL DL<=0.05 MIU/L-ACNC: 6.7 UIU/ML (ref 0.27–4.2)
VLDLC SERPL-MCNC: 14 MG/DL (ref 5–40)

## 2023-10-06 PROCEDURE — 92960 CARDIOVERSION ELECTRIC EXT: CPT

## 2023-10-06 PROCEDURE — 85610 PROTHROMBIN TIME: CPT

## 2023-10-06 PROCEDURE — 80061 LIPID PANEL: CPT

## 2023-10-06 PROCEDURE — 0 MEPERIDINE PER 100 MG: Performed by: INTERNAL MEDICINE

## 2023-10-06 PROCEDURE — 83036 HEMOGLOBIN GLYCOSYLATED A1C: CPT

## 2023-10-06 PROCEDURE — 25010000002 MIDAZOLAM PER 1MG: Performed by: INTERNAL MEDICINE

## 2023-10-06 PROCEDURE — 80053 COMPREHEN METABOLIC PANEL: CPT

## 2023-10-06 PROCEDURE — 84443 ASSAY THYROID STIM HORMONE: CPT

## 2023-10-06 PROCEDURE — 36415 COLL VENOUS BLD VENIPUNCTURE: CPT

## 2023-10-06 PROCEDURE — 25010000002 AMIODARONE IN DEXTROSE 5% 150-4.21 MG/100ML-% SOLUTION: Performed by: INTERNAL MEDICINE

## 2023-10-06 RX ORDER — MIDAZOLAM HYDROCHLORIDE 2 MG/2ML
INJECTION, SOLUTION INTRAMUSCULAR; INTRAVENOUS
Status: COMPLETED | OUTPATIENT
Start: 2023-10-06 | End: 2023-10-06

## 2023-10-06 RX ORDER — MEPERIDINE HYDROCHLORIDE 25 MG/ML
INJECTION INTRAMUSCULAR; INTRAVENOUS; SUBCUTANEOUS
Status: COMPLETED | OUTPATIENT
Start: 2023-10-06 | End: 2023-10-06

## 2023-10-06 RX ORDER — AMIODARONE HYDROCHLORIDE 50 MG/ML
150 INJECTION, SOLUTION INTRAVENOUS ONCE
Status: DISCONTINUED | OUTPATIENT
Start: 2023-10-06 | End: 2023-10-06

## 2023-10-06 RX ADMIN — MIDAZOLAM HYDROCHLORIDE 4 MG: 1 INJECTION, SOLUTION INTRAMUSCULAR; INTRAVENOUS at 09:04

## 2023-10-06 RX ADMIN — MEPERIDINE HYDROCHLORIDE 25 MG: 25 INJECTION INTRAMUSCULAR; INTRAVENOUS; SUBCUTANEOUS at 09:09

## 2023-10-06 RX ADMIN — RIVAROXABAN 20 MG: 20 TABLET, FILM COATED ORAL at 08:56

## 2023-10-06 RX ADMIN — AMIODARONE HYDROCHLORIDE 150 MG: 1.5 INJECTION, SOLUTION INTRAVENOUS at 09:40

## 2023-10-06 NOTE — DISCHARGE INSTR - APPOINTMENTS
Echo Lab Phone Number: [phone]    A responsible adult should drive you home and stay with you today and tonight.  Do not drive a car or operate any hazardous machinery for 24 hours.  Do not drink any alcoholic beverages for 24 hours.  Do not make any legal decisions for 24 hours after sedation.  Do not engage in any strenuous activity.  Resume your medications, following prescribed instructions.  Contact your caregiver if you have questions or concerns about your care.    In the event of an emergency, call 911 or go to your nearest emergency room.    You received the following medications during your procedure: Versed and Demerol.

## 2023-10-09 NOTE — TELEPHONE ENCOUNTER
Caller: tao jordan    Relationship: Emergency Contact    Best call back number:     [phone]     Requested Prescriptions:   Requested Prescriptions     Pending Prescriptions Disp Refills    traZODone (DESYREL) 100 MG tablet 90 tablet 1      Pharmacy where request should be sent: [facility] [place], [place] - [address] - [phone]  - [phone] FX     Last office visit with prescribing clinician: 8/23/2023   Last telemedicine visit with prescribing clinician: Visit date not found   Next office visit with prescribing clinician: 10/25/2023     Additional details provided by patient: PATIENT'S DAUGHTER IN LAW STATES THAT SHE IS OUT OF MEDICATION.     Does the patient have less than a 3 day supply:  [] Yes  [] No    Would you like a call back once the refill request has been completed: [] Yes [] No    If the office needs to give you a call back, can they leave a voicemail: [] Yes [] No    Nichelle Neal Rep   10/09/23 13:05 EDT

## 2023-10-10 RX ORDER — TRAZODONE HYDROCHLORIDE 100 MG/1
100 TABLET ORAL NIGHTLY
Qty: 90 TABLET | Refills: 1 | Status: SHIPPED | OUTPATIENT
Start: 2023-10-10

## 2023-10-11 ENCOUNTER — OFFICE VISIT (OUTPATIENT)
Dept: CARDIOLOGY | Facility: CLINIC | Age: 78
End: 2023-10-11
Payer: MEDICARE

## 2023-10-11 VITALS
SYSTOLIC BLOOD PRESSURE: 117 MMHG | HEIGHT: 62 IN | DIASTOLIC BLOOD PRESSURE: 66 MMHG | BODY MASS INDEX: 31.76 KG/M2 | WEIGHT: 172.6 LBS | HEART RATE: 134 BPM

## 2023-10-11 DIAGNOSIS — I10 PRIMARY HYPERTENSION: ICD-10-CM

## 2023-10-11 DIAGNOSIS — E78.2 MIXED HYPERLIPIDEMIA: ICD-10-CM

## 2023-10-11 DIAGNOSIS — I48.11 LONGSTANDING PERSISTENT ATRIAL FIBRILLATION: Primary | ICD-10-CM

## 2023-10-11 PROCEDURE — 3078F DIAST BP <80 MM HG: CPT

## 2023-10-11 PROCEDURE — 93000 ELECTROCARDIOGRAM COMPLETE: CPT

## 2023-10-11 PROCEDURE — 99214 OFFICE O/P EST MOD 30 MIN: CPT

## 2023-10-11 PROCEDURE — 3074F SYST BP LT 130 MM HG: CPT

## 2023-10-11 NOTE — PROGRESS NOTES
Chief Complaint  Longstanding persistent atrial fibrillation and Follow-up (F/U post cardioversion)    Subjective        History of Present Illness  Hilary Xie presents to [facility] CARDIOLOG[facility] for follow up.  Ms. Xie is a 78-year-old female with past medical history outlined below, significant for hypertension, hyperlipidemia and atrial fibrillation.  She presents for follow-up on recent cardioversion which was successful in converting her back to sinus rhythm.  Patient states she feels good.  She has no shortness of breath, dizziness, palpitations, lightheadedness.  She denies any chest pain or discomfort.      Past Medical History:   Diagnosis Date    Atrial fibrillation     Hyperlipidemia     Hypertension        ALLERGY  Allergies   Allergen Reactions    Donepezil Nausea Only    Metformin Nausea And Vomiting    Sulfa Antibiotics Hives        Past Surgical History:   Procedure Laterality Date    SPINE SURGERY          Social History     Socioeconomic History    Marital status: Single   Tobacco Use    Smoking status: Never    Smokeless tobacco: Never   Vaping Use    Vaping Use: Never used   Substance and Sexual Activity    Alcohol use: Never    Drug use: Never    Sexual activity: Defer       Family History   Problem Relation Age of Onset    Cancer Mother     Diabetes Mother     Cancer Father     Heart failure Brother         Current Outpatient Medications on File Prior to Visit   Medication Sig    amiodarone (PACERONE) 200 MG tablet Take 1 tablet by mouth Daily.    Cholecalciferol (Vitamin D3) 50 MCG (2000 UT) capsule TAKE 1 CAPSULE BY MOUTH DAILY    ferrous sulfate (FeroSul) 325 (65 FE) MG tablet Take 1 tablet by mouth Daily.    glimepiride (AMARYL) 2 MG tablet Take 1 tablet by mouth Every Morning Before Breakfast.    loratadine (CLARITIN) 10 MG tablet TAKE 1 TABLET BY MOUTH EVERY DAY (Patient taking differently: Take 1 tablet by mouth Daily As Needed.)    OLANZapine (zyPREXA) 5 MG tablet  "TAKE 1 TABLET BY MOUTH DAILY.    risedronate (ACTONEL) 150 MG tablet TAKE 1 TABLET BY MOUTH ONCE EVERY 30 DAYS    rivaroxaban (XARELTO) 20 MG tablet Take 1 tablet by mouth Daily With Dinner.    rosuvastatin (CRESTOR) 20 MG tablet Take 1 tablet by mouth Daily.    sertraline (ZOLOFT) 100 MG tablet Take 1.5 tablets by mouth Daily.    traZODone (DESYREL) 100 MG tablet Take 1 tablet by mouth Every Night.    vitamin C (ASCORBIC ACID) 250 MG tablet Take 2 tablets by mouth Daily.    [DISCONTINUED] dilTIAZem CD (CARDIZEM CD) 180 MG 24 hr capsule TAKE 1 CAPSULE BY MOUTH EVERY DAY    [DISCONTINUED] metoprolol tartrate (LOPRESSOR) 25 MG tablet 1 to 2 tablets twice daily as needed for rapid heart rate (Patient not taking: Reported on 10/2/2023)     No current facility-administered medications on file prior to visit.       Objective   Vitals:    10/11/23 1149   BP: 117/66   Pulse: (!) 134   Weight: 78.3 kg (172 lb 9.6 oz)  Comment: pt states she weighs 153 at home - [place]   Height: 157.5 cm (62\")       Physical Exam  Constitutional:       General: She is awake. She is not in acute distress.     Appearance: Normal appearance.   HENT:      Head: Normocephalic.      Nose: Nose normal. No congestion.   Eyes:      Extraocular Movements: Extraocular movements intact.      Conjunctiva/sclera: Conjunctivae normal.      Pupils: Pupils are equal, round, and reactive to light.   Neck:      Thyroid: No thyromegaly.      Vascular: No JVD.   Cardiovascular:      Rate and Rhythm: Tachycardia present. Rhythm irregular.      Chest Wall: PMI is not displaced.      Pulses: Normal pulses.      Heart sounds: Normal heart sounds, S1 normal and S2 normal. No murmur heard.     No friction rub. No gallop. No S3 or S4 sounds.   Pulmonary:      Effort: Pulmonary effort is normal.      Breath sounds: Normal breath sounds. No wheezing, rhonchi or rales.   Abdominal:      General: Bowel sounds are normal.      Palpations: Abdomen is soft.      Tenderness: There " is no abdominal tenderness.   Musculoskeletal:      Cervical back: No tenderness.      Right lower leg: No edema.      Left lower leg: No edema.   Lymphadenopathy:      Cervical: No cervical adenopathy.   Skin:     General: Skin is warm and dry.      Capillary Refill: Capillary refill takes less than 2 seconds.      Coloration: Skin is not cyanotic.      Findings: No petechiae or rash.      Nails: There is no clubbing.   Neurological:      Mental Status: She is alert.   Psychiatric:         Mood and Affect: Mood normal.         Behavior: Behavior is cooperative.           Result Review     The following data was reviewed by ALESSIA Trevizo on 10/11/23.    proBNP   Date Value Ref Range Status   03/08/2023 566.2 0.0 - 1,800.0 pg/mL Final     CMP          5/12/2023    08:40 8/18/2023    13:06 10/6/2023    08:14   CMP   Glucose 195  172  130    BUN 19  13  20    Creatinine 1.16  1.28  1.40    EGFR 48.4  42.7  38.3    Sodium 142  142  144    Potassium 4.8  3.5  4.3    Chloride 107  108  109    Calcium 10.0  9.0  9.7    Total Protein 6.4   6.7    Albumin 3.8   4.0    Globulin 2.6   2.7    Total Bilirubin 0.6   0.5    Alkaline Phosphatase 68   67    AST (SGOT) 14   16    ALT (SGPT) 13   15    Albumin/Globulin Ratio 1.5   1.5    BUN/Creatinine Ratio 16.4  10.2  14.3    Anion Gap 11.9  11.0  0.8      CBC w/diff          3/11/2023    04:28 5/12/2023    08:40 8/18/2023    13:06   CBC w/Diff   WBC 3.85  6.51  7.89    RBC 3.88  4.72  4.67    Hemoglobin 11.5  13.6  13.8    Hematocrit 33.5  41.8  40.8    MCV 86.3  88.6  87.4    MCH 29.6  28.8  29.6    MCHC 34.3  32.5  33.8    RDW 13.5  12.7  13.2    Platelets 81  202  189    Neutrophil Rel % 84.7  57.5  64.9    Immature Granulocyte Rel % 0.5  0.3  0.3    Lymphocyte Rel % 8.3  30.4  23.8    Monocyte Rel % 6.5  6.1  6.5    Eosinophil Rel % 0.0  4.6  3.7    Basophil Rel % 0.0  1.1  0.8       Lipid Panel          3/8/2023    14:29 5/12/2023    08:40 10/6/2023    08:14   Lipid  Panel   Total Cholesterol 129  172  164    Triglycerides 136  100  76    HDL Cholesterol 44  59  61    VLDL Cholesterol 24  18  14    LDL Cholesterol  61  95  89    LDL/HDL Ratio 1.31  1.58  1.44                   ECG 12 Lead    Date/Time: 10/11/2023 12:28 PM  Performed by: Liyah Sanderson APRN    Authorized by: Liyah Sanderson APRN  Comparison: compared with previous ECG   Comparison to previous ECG: Significant change from previous EKG  Rhythm: atrial fibrillation  Rate: normal  BPM: 103  QRS axis: normal    Clinical impression: normal ECG  Comments: Nonspecific T wave abnormalities          Assessment & Plan  Diagnoses and all orders for this visit:    1. Longstanding persistent atrial fibrillation (Primary)    2. Primary hypertension    3. Mixed hyperlipidemia    Other orders  -     metoprolol tartrate (LOPRESSOR) 25 MG tablet; 1 to 2 tablets twice daily as needed for rapid heart rate  Dispense: 60 tablet; Refill: 1  -     ECG 12 Lead      1.  Status post successful cardioversion, now back in atrial fibrillation at a rate of 103.  She is asymptomatic.  We will start patient back on metoprolol 25 mg daily for better rate control.  Discontinue diltiazem.  Continue amiodarone 200 mg daily.  We will discuss with Dr. Dougherty further if he wants to repeat cardioversion.  We will also send referral for electrophysiology.    2.  Blood pressure is well controlled.  Continue current regimen.  3.  Continue rosuvastatin 20 mg daily, most recent lipid panel demonstrates total cholesterol 172, HDL 59, LDL 95 and triglycerides 100    Follow Up   No follow-ups on file.    Patient was given instructions and counseling regarding her condition or for health maintenance advice. Please see specific information pulled into the AVS if appropriate.     ALESSIA Trevizo  10/11/23  12:24 EDT    Dictated Utilizing Dragon Dictation

## 2023-10-20 DIAGNOSIS — F33.41 RECURRENT MAJOR DEPRESSION IN PARTIAL REMISSION: ICD-10-CM

## 2023-10-20 RX ORDER — SERTRALINE HYDROCHLORIDE 100 MG/1
150 TABLET, FILM COATED ORAL DAILY
Qty: 135 TABLET | Refills: 1 | Status: SHIPPED | OUTPATIENT
Start: 2023-10-20

## 2023-10-20 NOTE — TELEPHONE ENCOUNTER
Rx Refill Note  Requested Prescriptions      No prescriptions requested or ordered in this encounter      Last office visit with prescribing clinician: 8/23/2023   Last telemedicine visit with prescribing clinician: Visit date not found   Next office visit with prescribing clinician: 10/25/2023                         Would you like a call back once the refill request has been completed: [] Yes [] No    If the office needs to give you a call back, can they leave a voicemail: [] Yes [] No    Cheyenne Jim MA  10/20/23, 15:28 EDT

## 2023-10-23 ENCOUNTER — TELEPHONE (OUTPATIENT)
Dept: CARDIOLOGY | Facility: CLINIC | Age: 78
End: 2023-10-23
Payer: MEDICARE

## 2023-10-23 DIAGNOSIS — I48.11 LONGSTANDING PERSISTENT ATRIAL FIBRILLATION: Primary | ICD-10-CM

## 2023-10-23 RX ORDER — SODIUM CHLORIDE 0.9 % (FLUSH) 0.9 %
10 SYRINGE (ML) INJECTION EVERY 12 HOURS SCHEDULED
OUTPATIENT
Start: 2023-10-23

## 2023-10-23 RX ORDER — SODIUM CHLORIDE 0.9 % (FLUSH) 0.9 %
10 SYRINGE (ML) INJECTION AS NEEDED
OUTPATIENT
Start: 2023-10-23

## 2023-10-23 RX ORDER — SODIUM CHLORIDE 9 MG/ML
40 INJECTION, SOLUTION INTRAVENOUS AS NEEDED
OUTPATIENT
Start: 2023-10-23

## 2023-10-23 NOTE — TELEPHONE ENCOUNTER
Per Liyah, APRN: Please call patient, after discussing with Dr Dougherty, we recommend either try to do another cardioversion in Nov with her on amiodarone for a little longer or discontinue the amiodarone and just increase the metoprolol for better rate control. up to her if she wants to try cardioversion again we can. with being on amiodarone for longer she has a higher change of it being successful and keeping her out of afib.     SW patient. Patient is agreeable to staying on amiodarone and repeating cardioversion in November. Please call and schedule when able.     Patient verbalized understanding and appreciation.

## 2023-10-23 NOTE — TELEPHONE ENCOUNTER
Orders placed.  Karin can you please make sure that it shows up so that you can add her to the schedule for November 15.

## 2023-10-24 ENCOUNTER — PREP FOR SURGERY (OUTPATIENT)
Dept: OTHER | Facility: HOSPITAL | Age: 78
End: 2023-10-24
Payer: MEDICARE

## 2023-10-24 ENCOUNTER — TELEPHONE (OUTPATIENT)
Dept: CARDIOLOGY | Facility: CLINIC | Age: 78
End: 2023-10-24
Payer: MEDICARE

## 2023-10-24 DIAGNOSIS — I48.11 LONGSTANDING PERSISTENT ATRIAL FIBRILLATION: Primary | ICD-10-CM

## 2023-10-24 NOTE — TELEPHONE ENCOUNTER
I spoke to patient and gave an arrival time of 9:00 on 10/25/23 for Cardioversion. Patient was instructed to have a  for the day of the procedure and to arrive at the main entrance/registration area. Patient was instructed to continue all medications as usual. Patient was instructed to be NPO after midnight with sips of water as needed. Patient is agreeable with no other questions or concerns.

## 2023-10-31 PROBLEM — Z09 HOSPITAL DISCHARGE FOLLOW-UP: Status: RESOLVED | Noted: 2023-03-14 | Resolved: 2023-10-31

## 2023-11-01 ENCOUNTER — OFFICE VISIT (OUTPATIENT)
Dept: INTERNAL MEDICINE | Facility: CLINIC | Age: 78
End: 2023-11-01
Payer: MEDICARE

## 2023-11-01 VITALS
TEMPERATURE: 97.1 F | BODY MASS INDEX: 30.55 KG/M2 | HEART RATE: 65 BPM | OXYGEN SATURATION: 97 % | WEIGHT: 166 LBS | DIASTOLIC BLOOD PRESSURE: 60 MMHG | SYSTOLIC BLOOD PRESSURE: 82 MMHG | HEIGHT: 62 IN

## 2023-11-01 DIAGNOSIS — E11.65 TYPE 2 DIABETES MELLITUS WITH HYPERGLYCEMIA, WITHOUT LONG-TERM CURRENT USE OF INSULIN: Primary | ICD-10-CM

## 2023-11-01 DIAGNOSIS — N18.32 STAGE 3B CHRONIC KIDNEY DISEASE: ICD-10-CM

## 2023-11-01 DIAGNOSIS — E03.8 SUBCLINICAL HYPOTHYROIDISM: ICD-10-CM

## 2023-11-01 DIAGNOSIS — I10 PRIMARY HYPERTENSION: ICD-10-CM

## 2023-11-01 DIAGNOSIS — E78.2 MIXED HYPERLIPIDEMIA: ICD-10-CM

## 2023-11-01 DIAGNOSIS — I48.11 LONGSTANDING PERSISTENT ATRIAL FIBRILLATION: ICD-10-CM

## 2023-11-01 DIAGNOSIS — F03.90 MAJOR NEUROCOGNITIVE DISORDER: ICD-10-CM

## 2023-11-01 RX ORDER — OLANZAPINE 7.5 MG/1
7.5 TABLET ORAL DAILY
Qty: 90 TABLET | Refills: 1 | Status: SHIPPED | OUTPATIENT
Start: 2023-11-01

## 2023-11-01 RX ORDER — MEMANTINE HYDROCHLORIDE AND DONEPEZIL HYDROCHLORIDE 14; 10 MG/1; MG/1
1 CAPSULE ORAL NIGHTLY
COMMUNITY

## 2023-11-01 NOTE — ASSESSMENT & PLAN NOTE
Patient was started back on Namzaric since her last office visit, but is still having issues with sleep.  We added 100 trazodone earlier this year, still not beneficial.  She is only on 5 mg of Zyprexa, recommend titrating to 7.5 at this time.

## 2023-11-01 NOTE — ASSESSMENT & PLAN NOTE
Patient had failed attempt at cardioversion recently as of her 11/23 OV.  She is scheduled for another attempt by Dr. Dougherty.  She was switched to low-dose metoprolol, just taken 25 mg of Lopressor in the morning, has extra tablets on hand if needed later in the day.  She is maintained on Xarelto for stroke prophylaxis, and low to moderate dose amiodarone, continue same plan of care.

## 2023-11-01 NOTE — ASSESSMENT & PLAN NOTE
TSH is up from 2.7 to 6.7 as of her 11/23 OV.  Patient may warrant low-dose supplementation, however she corrected previously from a TSH of 5.4.  Additionally should not have another attempt at cardioversion, so we do not want to accidentally over replaced her.  Recommend repeat studies in about 3 months.

## 2023-11-01 NOTE — PROGRESS NOTES
"Chief Complaint  Diabetes and Follow-up (Pt states that this is routine, she had labs 2 weeks ago, she has no new issues. )    Subjective      Hilary Xie presents to [facility]    History of Present Illness:  Patient pleasant 78-year-old female with underlying PAF, DM, osteoporosis on treatment, among others, seen 2/22 as a New Pt, and who is coming in 11/23 for routine 3-month follow-up.  We will review all her routine medications, go over her labs in detail, and address any new concerns as time permits.    ---> seen 3/23 for TCM:  COVID-19 pneumonia/multifocal pneumonia  Acute hypoxemic respiratory failure, increased work of breathing, satting 86% on room air  Atrial fibrillation on anticoagulation  Hyperglycemia =   I discussed with the patient that I did not want to send her home with insulin so we would trial glipizide 5 mg twice daily    Review of Systems   Constitutional:  Negative for appetite change, fatigue and fever.   HENT:  Negative for congestion and ear pain.    Eyes:  Negative for blurred vision.   Respiratory:  Negative for cough, chest tightness, shortness of breath and wheezing.    Cardiovascular:  Negative for chest pain, palpitations and leg swelling.   Gastrointestinal:  Negative for abdominal pain.   Genitourinary:  Negative for difficulty urinating, dysuria and hematuria.   Musculoskeletal:  Negative for arthralgias and gait problem.   Skin:  Negative for skin lesions.   Neurological:  Negative for syncope, memory problem and confusion.   Psychiatric/Behavioral:  Negative for self-injury and depressed mood.        Objective   Vital Signs:   BP (!) 82/60   Pulse 65   Temp 97.1 °F (36.2 °C) (Skin)   Ht 157.5 cm (62.01\")   Wt 75.3 kg (166 lb)   SpO2 97%   BMI 30.35 kg/m²           Physical Exam  Vitals and nursing note reviewed.   Constitutional:       General: She is not in acute distress.     Appearance: Normal appearance. She is not toxic-appearing. "   HENT:      Head: Atraumatic.      Right Ear: External ear normal.      Left Ear: External ear normal.      Nose: Nose normal.      Mouth/Throat:      Mouth: Mucous membranes are moist.   Eyes:      General:         Right eye: No discharge.         Left eye: No discharge.      Extraocular Movements: Extraocular movements intact.      Pupils: Pupils are equal, round, and reactive to light.   Cardiovascular:      Rate and Rhythm: Normal rate. Rhythm irregular.      Pulses: Normal pulses.      Heart sounds: Normal heart sounds. No murmur heard.     No gallop.      Comments: Heart tones irregularly irregular, no S3.  Pulmonary:      Effort: Pulmonary effort is normal. No respiratory distress.      Breath sounds: No wheezing, rhonchi or rales.      Comments: Lung fields clear bilaterally.  Abdominal:      General: There is no distension.      Palpations: Abdomen is soft. There is no mass.      Tenderness: There is no abdominal tenderness. There is no guarding.      Comments: No epigastric tenderness.   Musculoskeletal:         General: No swelling or tenderness.      Cervical back: No tenderness.      Right lower leg: No edema.      Left lower leg: No edema.   Skin:     General: Skin is warm and dry.      Findings: No rash.   Neurological:      General: No focal deficit present.      Mental Status: She is alert and oriented to person, place, and time. Mental status is at baseline.      Motor: No weakness.      Gait: Gait normal.   Psychiatric:         Mood and Affect: Mood normal.         Thought Content: Thought content normal.          Result Review   The following data was reviewed by: Clarence Martínez MD on 02/14/2022:  [x] Laboratory  [] Microbiology  [] Radiology  [] EKG/telemetry  [] Cardiology/Vascular  [] Pathology  [x] Old records             Assessment and Plan   Diagnoses and all orders for this visit:    1. Type 2 diabetes mellitus with hyperglycemia, without long-term current use of insulin  (Primary)  Assessment & Plan:  A1c is down sharply from 9.0 to 6.8 as of her 11/23 office visit.  This was after just low-dose sulfonylurea for cost reasons I believe.  Recall she is intolerant to metformin.  Discussed with patient to avoid situations that would cause hypoglycemia, and to call if she starts experiencing this despite taking appropriate precautions.    Orders:  -     TSH; Future  -     Hemoglobin A1c; Future    2. Stage 3b chronic kidney disease  Assessment & Plan:  GFR is back down below 40 as of her 11/23 office visit.  This is a little concerning since her diabetes is under much better control.  Discussed with the patient she warrants evaluation by nephrology at this time.    Orders:  -     Ambulatory Referral to Nephrology    3. Primary hypertension  -     Comprehensive Metabolic Panel; Future    4. Longstanding persistent atrial fibrillation  Overview:  Since 1980's.  Cardioverted x1.  Cardioverted recently as of 11/23 OV, not successful.  Followed by Dr. Chamberlain.      Assessment & Plan:  Patient had failed attempt at cardioversion recently as of her 11/23 OV.  She is scheduled for another attempt by Dr. Dougherty.  She was switched to low-dose metoprolol, just taken 25 mg of Lopressor in the morning, has extra tablets on hand if needed later in the day.  She is maintained on Xarelto for stroke prophylaxis, and low to moderate dose amiodarone, continue same plan of care.      5. Mixed hyperlipidemia  Assessment & Plan:  LDL is down to 89 as of her 11/23 office visit.  This is reasonable for primary prevention, but she does have underlying diabetes, so may need to titrate Crestor and/or add Zetia down the road.      6. Subclinical hypothyroidism  Assessment & Plan:  TSH is up from 2.7 to 6.7 as of her 11/23 OV.  Patient may warrant low-dose supplementation, however she corrected previously from a TSH of 5.4.  Additionally should not have another attempt at cardioversion, so we do not want to  accidentally over replaced her.  Recommend repeat studies in about 3 months.      7. Major neurocognitive disorder  Overview:  Dewayne Kong MD  = note from 10/21 was reviewed.    Assessment & Plan:  Patient was started back on Namzaric since her last office visit, but is still having issues with sleep.  We added 100 trazodone earlier this year, still not beneficial.  She is only on 5 mg of Zyprexa, recommend titrating to 7.5 at this time.      Other orders  -     OLANZapine (zyPREXA) 7.5 MG tablet; Take 1 tablet by mouth Daily.  Dispense: 90 tablet; Refill: 1               Follow Up   Return in about 3 months (around 2/1/2024).  Patient was given instructions and counseling regarding her condition or for health maintenance advice. Please see specific information pulled into the AVS if appropriate.

## 2023-11-01 NOTE — ASSESSMENT & PLAN NOTE
LDL is down to 89 as of her 11/23 office visit.  This is reasonable for primary prevention, but she does have underlying diabetes, so may need to titrate Crestor and/or add Zetia down the road.

## 2023-11-01 NOTE — ASSESSMENT & PLAN NOTE
Patient is maintaining controlled A-fib.  She has low-dose Cardizem on board for rate.  Looks like she was switched from Xarelto over to Coumadin since her last office visit.  INR is 2.4 as of 11/23 OV, so appropriate to continue current dosing.

## 2023-11-01 NOTE — ASSESSMENT & PLAN NOTE
A1c is down sharply from 9.0 to 6.8 as of her 11/23 office visit.  This was after just low-dose sulfonylurea for cost reasons I believe.  Recall she is intolerant to metformin.  Discussed with patient to avoid situations that would cause hypoglycemia, and to call if she starts experiencing this despite taking appropriate precautions.

## 2023-11-01 NOTE — ASSESSMENT & PLAN NOTE
GFR is back down below 40 as of her 11/23 office visit.  This is a little concerning since her diabetes is under much better control.  Discussed with the patient she warrants evaluation by nephrology at this time.

## 2023-11-06 ENCOUNTER — TELEPHONE (OUTPATIENT)
Dept: INTERNAL MEDICINE | Facility: CLINIC | Age: 78
End: 2023-11-06
Payer: MEDICARE

## 2023-11-06 NOTE — TELEPHONE ENCOUNTER
I increased the below from 5 to 7.5 to help with sleep.  Please ensure it was sent to correct pharmacy.    OLANZapine (zyPREXA) 7.5 MG tablet; Take 1 tablet by mouth Daily.  Dispense: 90 tablet; Refill: 1

## 2023-11-06 NOTE — TELEPHONE ENCOUNTER
Patient called office stating she was seen last week, patient was told she was getting a medication for sleeping but she didn't got it, please advise

## 2023-11-06 NOTE — TELEPHONE ENCOUNTER
Patient called back. Advised patient of Dr. Martínez's increase in and instructions of her medication. Patient voiced understanding.    Aileen -

## 2023-11-09 ENCOUNTER — TELEPHONE (OUTPATIENT)
Dept: CARDIOLOGY | Facility: CLINIC | Age: 78
End: 2023-11-09
Payer: MEDICARE

## 2023-11-09 RX ORDER — RISEDRONATE SODIUM 150 MG/1
150 TABLET, FILM COATED ORAL
Qty: 3 TABLET | Refills: 1 | Status: SHIPPED | OUTPATIENT
Start: 2023-11-09

## 2023-11-09 NOTE — TELEPHONE ENCOUNTER
I called to give patient appointment information for cardioversion. Patient wishes to cancel at this time.

## 2023-11-15 NOTE — ASSESSMENT & PLAN NOTE
Does not appear patient has done well with the half dose of Zyprexa, perhaps has been interfering with her sleep, so we will resume the whole dose as of her 3/22 office visit.  Family to call in about a week to see how this is working, and will consider adding low-dose trazodone at that time to help with sleep and moods as well.   Detail Level: Zone

## 2023-11-20 ENCOUNTER — HOSPITAL ENCOUNTER (OUTPATIENT)
Dept: GENERAL RADIOLOGY | Facility: HOSPITAL | Age: 78
Discharge: HOME | End: 2023-11-20
Payer: MEDICARE

## 2023-11-20 DIAGNOSIS — M25.551 PAIN OF RIGHT HIP: ICD-10-CM

## 2023-11-20 DIAGNOSIS — M25.552 PAIN OF LEFT HIP: ICD-10-CM

## 2023-11-20 PROCEDURE — 73502 X-RAY EXAM HIP UNI 2-3 VIEWS: CPT

## 2023-11-27 DIAGNOSIS — R26.9 GAIT DISORDER: Primary | ICD-10-CM

## 2023-12-07 ENCOUNTER — OFFICE VISIT (OUTPATIENT)
Dept: INTERNAL MEDICINE | Facility: CLINIC | Age: 78
End: 2023-12-07
Payer: MEDICARE

## 2023-12-07 VITALS
HEIGHT: 61 IN | BODY MASS INDEX: 32.4 KG/M2 | RESPIRATION RATE: 16 BRPM | DIASTOLIC BLOOD PRESSURE: 76 MMHG | WEIGHT: 171.6 LBS | SYSTOLIC BLOOD PRESSURE: 115 MMHG | OXYGEN SATURATION: 97 % | TEMPERATURE: 97.3 F | HEART RATE: 59 BPM

## 2023-12-07 DIAGNOSIS — M25.552 LEFT HIP PAIN: Primary | ICD-10-CM

## 2023-12-07 PROCEDURE — 1160F RVW MEDS BY RX/DR IN RCRD: CPT

## 2023-12-07 PROCEDURE — 99213 OFFICE O/P EST LOW 20 MIN: CPT

## 2023-12-07 PROCEDURE — 3074F SYST BP LT 130 MM HG: CPT

## 2023-12-07 PROCEDURE — 3078F DIAST BP <80 MM HG: CPT

## 2023-12-07 PROCEDURE — 1159F MED LIST DOCD IN RCRD: CPT

## 2023-12-07 RX ORDER — TRAMADOL HYDROCHLORIDE 50 MG/1
50 TABLET ORAL EVERY 8 HOURS PRN
Qty: 21 TABLET | Refills: 0 | Status: SHIPPED | OUTPATIENT
Start: 2023-12-07

## 2023-12-07 RX ORDER — PREDNISONE 20 MG/1
TABLET ORAL
Qty: 9 TABLET | Refills: 0 | Status: SHIPPED | OUTPATIENT
Start: 2023-12-07 | End: 2023-12-13

## 2023-12-07 NOTE — ASSESSMENT & PLAN NOTE
Acute left hip pian  This has been going on for a couple of weeks  Denies any known injury/falls  Tender to palpation.   She states pain radiates down but is more of a soreness.   She has a walker if needed.  Patient has PT scheduled but not until Jan  Hip xray does show some arthritis.  Will give her tramadol PRN pain until she can get in with ortho.  Risks and benefits discussed with patient. NATALIE reviewed.   Last A1C was less than 7%  We will do a short round of prednisone also prescribed. Pt is to monitor her glucose at home. If her sugars go above 200, she is to contact the office.

## 2023-12-07 NOTE — PROGRESS NOTES
Chief Complaint  Left hip pain (Started about the end of November. Started to get up off the couch and had an unbelievable pain shoot through her hip. Now it is a throbbing ache. No fall. No injury. Radiates down the leg. Not as bad now. )    History of Present Illness  SUBJECTIVE  Hilary Xie presents to [facility] with complaints of left hip pain. She states that this started towards the end of November and it was a shooting pain. She denies any falls or known injuries. She states that the pain will radiate down her leg. She states it feels sore.   She denies any lower back pain.  She denies any leg swelling. She admits pain is worse with walking.  She has an appt scheduled with PT on Jan10th.       Past Medical History:   Diagnosis Date    Atrial fibrillation     Hyperlipidemia     Hypertension       Family History   Problem Relation Age of Onset    Cancer Mother     Diabetes Mother     Cancer Father     Heart failure Brother       Past Surgical History:   Procedure Laterality Date    SPINE SURGERY          Current Outpatient Medications:     amiodarone (PACERONE) 200 MG tablet, Take 1 tablet by mouth Daily., Disp: 30 tablet, Rfl: 5    Cholecalciferol (Vitamin D3) 50 MCG (2000 UT) capsule, TAKE 1 CAPSULE BY MOUTH DAILY, Disp: 90 capsule, Rfl: 1    ferrous sulfate (FeroSul) 325 (65 FE) MG tablet, Take 1 tablet by mouth Daily., Disp: 90 tablet, Rfl: 1    glimepiride (AMARYL) 2 MG tablet, Take 1 tablet by mouth Every Morning Before Breakfast., Disp: 90 tablet, Rfl: 1    Memantine HCl-Donepezil HCl (Namzaric) 14-10 MG capsule sustained-release 24 hr, Take 1 each by mouth Every Night., Disp: , Rfl:     metoprolol tartrate (LOPRESSOR) 25 MG tablet, 1 to 2 tablets twice daily as needed for rapid heart rate, Disp: 60 tablet, Rfl: 1    OLANZapine (zyPREXA) 7.5 MG tablet, Take 1 tablet by mouth Daily., Disp: 90 tablet, Rfl: 1    risedronate (ACTONEL) 150 MG tablet, TAKE 1 TABLET BY MOUTH  "EVERY 30 DAYS, Disp: 3 tablet, Rfl: 1    rivaroxaban (XARELTO) 20 MG tablet, Take 1 tablet by mouth Daily With Dinner., Disp: 90 tablet, Rfl: 3    rosuvastatin (CRESTOR) 20 MG tablet, Take 1 tablet by mouth Daily., Disp: 90 tablet, Rfl: 1    sertraline (ZOLOFT) 100 MG tablet, Take 1.5 tablets by mouth Daily., Disp: 135 tablet, Rfl: 1    traZODone (DESYREL) 100 MG tablet, Take 1 tablet by mouth Every Night., Disp: 90 tablet, Rfl: 1    vitamin C (ASCORBIC ACID) 250 MG tablet, Take 2 tablets by mouth Daily., Disp: , Rfl:     predniSONE (DELTASONE) 20 MG tablet, Take 2 tablets by mouth Daily for 3 days, THEN 1 tablet Daily for 3 days., Disp: 9 tablet, Rfl: 0    traMADol (ULTRAM) 50 MG tablet, Take 1 tablet by mouth Every 8 (Eight) Hours As Needed for Moderate Pain., Disp: 21 tablet, Rfl: 0    OBJECTIVE  Vital Signs:   /76 (BP Location: Right arm, Patient Position: Sitting, Cuff Size: Large Adult)   Pulse 59   Temp 97.3 °F (36.3 °C) (Temporal)   Resp 16   Ht 154.9 cm (61\")   Wt 77.8 kg (171 lb 9.6 oz)   SpO2 97%   BMI 32.42 kg/m²    Estimated body mass index is 32.42 kg/m² as calculated from the following:    Height as of this encounter: 154.9 cm (61\").    Weight as of this encounter: 77.8 kg (171 lb 9.6 oz).     Wt Readings from Last 3 Encounters:   12/07/23 77.8 kg (171 lb 9.6 oz)   11/01/23 75.3 kg (166 lb)   10/11/23 78.3 kg (172 lb 9.6 oz)     BP Readings from Last 3 Encounters:   12/07/23 115/76   11/01/23 (!) 82/60   10/11/23 117/66       Physical Exam  Vitals and nursing note reviewed.   Constitutional:       Appearance: Normal appearance.   HENT:      Head: Normocephalic.   Eyes:      Extraocular Movements: Extraocular movements intact.      Conjunctiva/sclera: Conjunctivae normal.   Cardiovascular:      Rate and Rhythm: Normal rate and regular rhythm.      Heart sounds: Normal heart sounds. No murmur heard.  Pulmonary:      Effort: Pulmonary effort is normal.      Breath sounds: Normal breath " sounds. No wheezing or rales.   Abdominal:      General: Bowel sounds are normal.      Palpations: Abdomen is soft.      Tenderness: There is no abdominal tenderness. There is no guarding.   Musculoskeletal:      Left hip: Tenderness present. Decreased range of motion.   Skin:     General: Skin is warm and dry.   Neurological:      General: No focal deficit present.      Mental Status: She is alert and oriented to person, place, and time. Mental status is at baseline.   Psychiatric:         Mood and Affect: Mood normal.         Behavior: Behavior normal.         Thought Content: Thought content normal.         Judgment: Judgment normal.          Result Review        XR Hip With or Without Pelvis 2 - 3 View Left    Result Date: 11/21/2023   Radiographic changes of osteoarthritis in both hips, worse on the left.      Derek Egan MD       Electronically Signed and Approved By: Derek Egan MD on 11/21/2023 at 9:53             XR Wrist 3+ View Left    Result Date: 8/15/2023    1. Diffuse osteopenia.  No acute osseous abnormality is identified of the left wrist. 2. Severe arthritic change of the thumb carpometacarpal joint.      ALEXYS LAUREANO MD       Electronically Signed and Approved By: ALEXYS LAUREANO MD on 8/15/2023 at 18:15             CT Cervical Spine Without Contrast    Result Date: 8/15/2023    1. No acute fracture is identified in the cervical spine. 2. Minimal grade 1 anterolisthesis of C2 on C3 and C3 on C4. 3. Multilevel mostly mild-to-moderate degenerative changes.     ALEXYS LAUREANO MD       Electronically Signed and Approved By: ALEXYS LAUREANO MD on 8/15/2023 at 17:45             CT Head Without Contrast    Result Date: 8/15/2023    1. No acute intracranial abnormality is identified. 2. Left frontal scalp soft tissue swelling without underlying fracture.     ALEXYS LAUREANO MD       Electronically Signed and Approved By: ALEXYS LAUREANO MD on 8/15/2023 at 17:42                The above  data has been reviewed by ALESSIA Siddiqui 12/07/2023 13:37 EST.          Patient Care Team:  Clarence Martínez MD as PCP - General (Internal Medicine)            ASSESSMENT & PLAN    Diagnoses and all orders for this visit:    1. Left hip pain (Primary)  Assessment & Plan:  Acute left hip pian  This has been going on for a couple of weeks  Denies any known injury/falls  Tender to palpation.   She states pain radiates down but is more of a soreness.   She has a walker if needed.  Patient has PT scheduled but not until Jan  Hip xray does show some arthritis.  Will give her tramadol PRN pain until she can get in with ortho.  Risks and benefits discussed with patient. NATALIE reviewed.   Last A1C was less than 7%  We will do a short round of prednisone also prescribed. Pt is to monitor her glucose at home. If her sugars go above 200, she is to contact the office.      Orders:  -     Ambulatory Referral to Orthopedic Surgery  -     traMADol (ULTRAM) 50 MG tablet; Take 1 tablet by mouth Every 8 (Eight) Hours As Needed for Moderate Pain.  Dispense: 21 tablet; Refill: 0    Other orders  -     predniSONE (DELTASONE) 20 MG tablet; Take 2 tablets by mouth Daily for 3 days, THEN 1 tablet Daily for 3 days.  Dispense: 9 tablet; Refill: 0         Tobacco Use: Low Risk  (12/7/2023)    Patient History     Smoking Tobacco Use: Never     Smokeless Tobacco Use: Never     Passive Exposure: Not on file       Follow Up     Return if symptoms worsen or fail to improve.    Please note that portions of this note were completed with a voice recognition program.    Patient was given instructions and counseling regarding her condition or for health maintenance advice. Please see specific information pulled into the AVS if appropriate.   I have reviewed information obtained and documented by others and I have confirmed the accuracy of this documented note.    ALESSIA Siddiqui

## 2023-12-28 DIAGNOSIS — M25.552 LEFT HIP PAIN: ICD-10-CM

## 2023-12-28 RX ORDER — TRAMADOL HYDROCHLORIDE 50 MG/1
50 TABLET ORAL EVERY 8 HOURS PRN
Qty: 21 TABLET | Refills: 0 | Status: SHIPPED | OUTPATIENT
Start: 2023-12-28

## 2024-01-05 ENCOUNTER — OFFICE VISIT (OUTPATIENT)
Dept: INTERNAL MEDICINE | Facility: CLINIC | Age: 79
End: 2024-01-05
Payer: MEDICARE

## 2024-01-05 VITALS
BODY MASS INDEX: 33.04 KG/M2 | OXYGEN SATURATION: 94 % | HEART RATE: 82 BPM | TEMPERATURE: 98.2 F | WEIGHT: 175 LBS | HEIGHT: 61 IN | SYSTOLIC BLOOD PRESSURE: 134 MMHG | DIASTOLIC BLOOD PRESSURE: 74 MMHG

## 2024-01-05 DIAGNOSIS — I10 PRIMARY HYPERTENSION: ICD-10-CM

## 2024-01-05 DIAGNOSIS — M25.552 LEFT HIP PAIN: ICD-10-CM

## 2024-01-05 DIAGNOSIS — M15.9 PRIMARY OSTEOARTHRITIS INVOLVING MULTIPLE JOINTS: Primary | ICD-10-CM

## 2024-01-05 PROCEDURE — 99213 OFFICE O/P EST LOW 20 MIN: CPT | Performed by: INTERNAL MEDICINE

## 2024-01-05 PROCEDURE — 3075F SYST BP GE 130 - 139MM HG: CPT | Performed by: INTERNAL MEDICINE

## 2024-01-05 PROCEDURE — 1160F RVW MEDS BY RX/DR IN RCRD: CPT | Performed by: INTERNAL MEDICINE

## 2024-01-05 PROCEDURE — 1170F FXNL STATUS ASSESSED: CPT | Performed by: INTERNAL MEDICINE

## 2024-01-05 PROCEDURE — 1159F MED LIST DOCD IN RCRD: CPT | Performed by: INTERNAL MEDICINE

## 2024-01-05 PROCEDURE — 3078F DIAST BP <80 MM HG: CPT | Performed by: INTERNAL MEDICINE

## 2024-01-05 RX ORDER — HYDROCODONE BITARTRATE AND ACETAMINOPHEN 7.5; 325 MG/1; MG/1
1 TABLET ORAL EVERY 12 HOURS PRN
Qty: 40 TABLET | Refills: 0 | Status: SHIPPED | OUTPATIENT
Start: 2024-01-05

## 2024-01-05 RX ORDER — PREDNISONE 20 MG/1
20 TABLET ORAL 2 TIMES DAILY
Qty: 10 TABLET | Refills: 0 | Status: SHIPPED | OUTPATIENT
Start: 2024-01-05 | End: 2024-01-10

## 2024-01-05 NOTE — PROGRESS NOTES
"Chief Complaint  Pain (Left hip pain, pt states pain for over a month. Daily activities compromised. Pain scale at 10, pt states current pain med is not giving relief. Pt decline flu shot. )    Subjective      Hilary Xie presents to [facility]    History of Present Illness:  Patient pleasant 78-year-old female with underlying PAF, DM, osteoporosis on treatment, among others, seen 2/22 as a New Pt, and who is coming in 11/23 for routine 3-month follow-up.  We will review all her routine medications, go over her labs in detail, and address any new concerns as time permits.---> Patient being seen in 1/24 for urgent issue as per chief complaint above.    ---> seen 3/23 for TCM:  COVID-19 pneumonia/multifocal pneumonia  Acute hypoxemic respiratory failure, increased work of breathing, satting 86% on room air  Atrial fibrillation on anticoagulation  Hyperglycemia =   I discussed with the patient that I did not want to send her home with insulin so we would trial glipizide 5 mg twice daily    Review of Systems   Constitutional:  Negative for appetite change, fatigue and fever.   HENT:  Negative for congestion and ear pain.    Eyes:  Negative for blurred vision.   Respiratory:  Negative for cough, chest tightness, shortness of breath and wheezing.    Cardiovascular:  Negative for chest pain, palpitations and leg swelling.   Gastrointestinal:  Negative for abdominal pain.   Genitourinary:  Negative for difficulty urinating, dysuria and hematuria.   Musculoskeletal:  Negative for arthralgias and gait problem.   Skin:  Negative for skin lesions.   Neurological:  Negative for syncope, memory problem and confusion.   Psychiatric/Behavioral:  Negative for self-injury and depressed mood.        Objective   Vital Signs:   /74   Pulse 82   Temp 98.2 °F (36.8 °C)   Ht 154.9 cm (60.98\")   Wt 79.4 kg (175 lb)   SpO2 94%   BMI 33.08 kg/m²           Physical Exam  Vitals and nursing note " reviewed.   Constitutional:       General: She is not in acute distress.     Appearance: Normal appearance. She is not toxic-appearing.   HENT:      Head: Atraumatic.      Right Ear: External ear normal.      Left Ear: External ear normal.      Nose: Nose normal.      Mouth/Throat:      Mouth: Mucous membranes are moist.   Eyes:      General:         Right eye: No discharge.         Left eye: No discharge.      Extraocular Movements: Extraocular movements intact.      Pupils: Pupils are equal, round, and reactive to light.   Cardiovascular:      Rate and Rhythm: Normal rate. Rhythm irregular.      Pulses: Normal pulses.      Heart sounds: Normal heart sounds. No murmur heard.     No gallop.      Comments: Heart tones irregularly irregular, no S3.  Pulmonary:      Effort: Pulmonary effort is normal. No respiratory distress.      Breath sounds: No wheezing, rhonchi or rales.      Comments: Lung fields clear bilaterally.  Abdominal:      General: There is no distension.      Palpations: Abdomen is soft. There is no mass.      Tenderness: There is no abdominal tenderness. There is no guarding.      Comments: No epigastric tenderness.   Musculoskeletal:         General: No swelling or tenderness.      Cervical back: No tenderness.      Right lower leg: No edema.      Left lower leg: No edema.   Skin:     General: Skin is warm and dry.      Findings: No rash.   Neurological:      General: No focal deficit present.      Mental Status: She is alert and oriented to person, place, and time. Mental status is at baseline.      Motor: No weakness.      Gait: Gait normal.   Psychiatric:         Mood and Affect: Mood normal.         Thought Content: Thought content normal.          Result Review   The following data was reviewed by: Clarence Martínez MD on 02/14/2022:  [x] Laboratory  [] Microbiology  [] Radiology  [] EKG/telemetry  [] Cardiology/Vascular  [] Pathology  [x] Old records             Assessment and Plan   Diagnoses and  all orders for this visit:    1. Primary osteoarthritis involving multiple joints (Primary)  Overview:  Had MRI of L Hip in 3/21 with tear of labrum.    Plain films 11/23:     There is mild joint space narrowing in both the right and left hip.  There is marginal osteophyte formation present bilaterally, greater on the left.  No fractures are identified.  No destructive bone lesions are seen.               Assessment & Plan:  Patient is here 1/24 with a flare of pain in the left hip.  There was no fall, no other trauma.    Exam with decreased ROM on the left greater than right, also with point tenderness on the left.    Patient is on Xarelto, so medications are limited.  We will treat per orders and put referral in for Ortho in regards to possible injection etc.    Orders:  -     HYDROcodone-acetaminophen (Norco) 7.5-325 MG per tablet; Take 1 tablet by mouth Every 12 (Twelve) Hours As Needed for Moderate Pain.  Dispense: 40 tablet; Refill: 0    2. Primary hypertension  Assessment & Plan:  Blood pressure certainly fine as of her 1/24 urgent visit.  Of note she was switched from diltiazem to low-dose metoprolol by cardiology in the interim.  Pulse is very stable in the low 80s.      3. Left hip pain  -     Ambulatory Referral to Orthopedic Surgery    Other orders  -     predniSONE (DELTASONE) 20 MG tablet; Take 1 tablet by mouth 2 (Two) Times a Day for 5 days.  Dispense: 10 tablet; Refill: 0                 Follow Up   Return for Next scheduled follow up.  Patient was given instructions and counseling regarding her condition or for health maintenance advice. Please see specific information pulled into the AVS if appropriate.

## 2024-01-05 NOTE — ASSESSMENT & PLAN NOTE
Patient is here 1/24 with a flare of pain in the left hip.  There was no fall, no other trauma.    Exam with decreased ROM on the left greater than right, also with point tenderness on the left.    Patient is on Xarelto, so medications are limited.  We will treat per orders and put referral in for Ortho in regards to possible injection etc.

## 2024-01-05 NOTE — ASSESSMENT & PLAN NOTE
Blood pressure certainly fine as of her 1/24 urgent visit.  Of note she was switched from diltiazem to low-dose metoprolol by cardiology in the interim.  Pulse is very stable in the low 80s.

## 2024-01-11 ENCOUNTER — OFFICE VISIT (OUTPATIENT)
Dept: ORTHOPEDIC SURGERY | Facility: CLINIC | Age: 79
End: 2024-01-11
Payer: MEDICARE

## 2024-01-11 VITALS
OXYGEN SATURATION: 94 % | BODY MASS INDEX: 34.36 KG/M2 | DIASTOLIC BLOOD PRESSURE: 55 MMHG | WEIGHT: 175 LBS | HEIGHT: 60 IN | SYSTOLIC BLOOD PRESSURE: 132 MMHG | HEART RATE: 50 BPM

## 2024-01-11 DIAGNOSIS — M47.816 OSTEOARTHRITIS OF LUMBAR SPINE, UNSPECIFIED SPINAL OSTEOARTHRITIS COMPLICATION STATUS: ICD-10-CM

## 2024-01-11 DIAGNOSIS — M16.12 PRIMARY OSTEOARTHRITIS OF LEFT HIP: Primary | ICD-10-CM

## 2024-01-11 DIAGNOSIS — M70.62 TROCHANTERIC BURSITIS OF LEFT HIP: ICD-10-CM

## 2024-01-11 RX ORDER — LIDOCAINE HYDROCHLORIDE 10 MG/ML
5 INJECTION, SOLUTION INFILTRATION; PERINEURAL
Status: COMPLETED | OUTPATIENT
Start: 2024-01-11 | End: 2024-01-11

## 2024-01-11 RX ORDER — TRIAMCINOLONE ACETONIDE 40 MG/ML
40 INJECTION, SUSPENSION INTRA-ARTICULAR; INTRAMUSCULAR
Status: COMPLETED | OUTPATIENT
Start: 2024-01-11 | End: 2024-01-11

## 2024-01-11 RX ADMIN — TRIAMCINOLONE ACETONIDE 40 MG: 40 INJECTION, SUSPENSION INTRA-ARTICULAR; INTRAMUSCULAR at 16:40

## 2024-01-11 RX ADMIN — LIDOCAINE HYDROCHLORIDE 5 ML: 10 INJECTION, SOLUTION INFILTRATION; PERINEURAL at 16:40

## 2024-01-11 NOTE — PROGRESS NOTES
"Chief Complaint  Initial Evaluation of the Left Hip     Subjective      Hilary Xie presents to [facility] for evaluation of the left hip. The patient reports left hip pain for about 3 weeks. She states it is worse with prolong walking. She locates pain to the lateral hip. She denies groin pain. She has pain when laying on that side.     Allergies   Allergen Reactions    Donepezil Nausea Only    Metformin Nausea And Vomiting    Sulfa Antibiotics Hives        Social History     Socioeconomic History    Marital status: Single   Tobacco Use    Smoking status: Never    Smokeless tobacco: Never   Vaping Use    Vaping Use: Never used   Substance and Sexual Activity    Alcohol use: Never    Drug use: Never    Sexual activity: Defer        I reviewed the patient's chief complaint, history of present illness, review of systems, past medical history, surgical history, family history, social history, medications, and allergy list.     Review of Systems     Constitutional: Denies fevers, chills, weight loss  Cardiovascular: Denies chest pain, shortness of breath  Skin: Denies rashes, acute skin changes  Neurologic: Denies headache, loss of consciousness  MSK: left hip pain      Vital Signs:   /55   Pulse 50   Ht 152.4 cm (60\")   Wt 79.4 kg (175 lb)   SpO2 94%   BMI 34.18 kg/m²          Physical Exam  General: Alert. No acute distress    Ortho Exam      Left hip-tender to the lateral hip over the greater trochanter.  Positive EHL, FHL, GS and TA. Sensation intact to all 5 nerves of the foot. Positive pulses. Flexion 85. External Rotation 30. Internal rotation 20. Pain with hip motion. Equal leg lengths. Negative straight leg raise.     Left hip  Date/Time: 1/11/2024 4:40 PM  Consent given by: patient  Site marked: site marked  Timeout: Immediately prior to procedure a time out was called to verify the correct patient, procedure, equipment, support staff and site/side marked as required "   Supporting Documentation  Indications: pain   Procedure Details  Location: hip -   Needle size: 22 G  Medications administered: 5 mL lidocaine 1 %; 40 mg triamcinolone acetonide 40 MG/ML  Patient tolerance: patient tolerated the procedure well with no immediate complications            Imaging Results (Most Recent)       None             Result Review :       No results found.           Assessment and Plan     Diagnoses and all orders for this visit:    1. Primary osteoarthritis of left hip (Primary)    2. Trochanteric bursitis of left hip    3. Osteoarthritis of lumbar spine, unspecified spinal osteoarthritis complication status        Discussed the treatment plan with the patient.  I reviewed the x-rays that were obtained previous with the patient. Discussed the risks and benefits of a left hip bursa injection. The patient expressed understanding and wished to proceed. She tolerated the injection well.     Call or return if worsening symptoms.    Follow Up     6 weeks      Patient was given instructions and counseling regarding her condition or for health maintenance advice. Please see specific information pulled into the AVS if appropriate.     Scribed for Derek Del Toro MD by Deedee Jiang.  01/11/24   16:17 EST    I have personally performed the services described in this document as scribed by the above individual and it is both accurate and complete. Derek Del Toro MD 01/11/24

## 2024-01-26 RX ORDER — PREDNISONE 20 MG/1
TABLET ORAL
Qty: 10 TABLET | Refills: 0 | OUTPATIENT
Start: 2024-01-26

## 2024-02-02 DIAGNOSIS — M15.9 PRIMARY OSTEOARTHRITIS INVOLVING MULTIPLE JOINTS: ICD-10-CM

## 2024-02-06 ENCOUNTER — OFFICE VISIT (OUTPATIENT)
Dept: INTERNAL MEDICINE | Facility: CLINIC | Age: 79
End: 2024-02-06
Payer: MEDICARE

## 2024-02-06 VITALS
HEART RATE: 52 BPM | HEIGHT: 60 IN | WEIGHT: 176.8 LBS | SYSTOLIC BLOOD PRESSURE: 110 MMHG | TEMPERATURE: 97.3 F | OXYGEN SATURATION: 96 % | DIASTOLIC BLOOD PRESSURE: 58 MMHG | BODY MASS INDEX: 34.71 KG/M2

## 2024-02-06 DIAGNOSIS — N18.32 STAGE 3B CHRONIC KIDNEY DISEASE: ICD-10-CM

## 2024-02-06 DIAGNOSIS — E78.2 MIXED HYPERLIPIDEMIA: ICD-10-CM

## 2024-02-06 DIAGNOSIS — M25.552 LEFT HIP PAIN: ICD-10-CM

## 2024-02-06 DIAGNOSIS — I48.11 LONGSTANDING PERSISTENT ATRIAL FIBRILLATION: ICD-10-CM

## 2024-02-06 DIAGNOSIS — E03.8 SUBCLINICAL HYPOTHYROIDISM: ICD-10-CM

## 2024-02-06 DIAGNOSIS — I10 PRIMARY HYPERTENSION: ICD-10-CM

## 2024-02-06 DIAGNOSIS — M15.9 PRIMARY OSTEOARTHRITIS INVOLVING MULTIPLE JOINTS: ICD-10-CM

## 2024-02-06 DIAGNOSIS — E11.65 TYPE 2 DIABETES MELLITUS WITH HYPERGLYCEMIA, WITHOUT LONG-TERM CURRENT USE OF INSULIN: Primary | ICD-10-CM

## 2024-02-06 DIAGNOSIS — F33.41 RECURRENT MAJOR DEPRESSION IN PARTIAL REMISSION: ICD-10-CM

## 2024-02-06 LAB
ANION GAP SERPL CALCULATED.3IONS-SCNC: 8.4 MMOL/L (ref 5–15)
BUN SERPL-MCNC: 30 MG/DL (ref 8–23)
BUN/CREAT SERPL: 18.4 (ref 7–25)
CALCIUM SPEC-SCNC: 9.3 MG/DL (ref 8.6–10.5)
CHLORIDE SERPL-SCNC: 109 MMOL/L (ref 98–107)
CO2 SERPL-SCNC: 24.6 MMOL/L (ref 22–29)
CREAT SERPL-MCNC: 1.63 MG/DL (ref 0.57–1)
EGFRCR SERPLBLD CKD-EPI 2021: 32 ML/MIN/1.73
GLUCOSE SERPL-MCNC: 124 MG/DL (ref 65–99)
HBA1C MFR BLD: 6.7 % (ref 4.8–5.6)
POTASSIUM SERPL-SCNC: 5 MMOL/L (ref 3.5–5.2)
SODIUM SERPL-SCNC: 142 MMOL/L (ref 136–145)
T4 FREE SERPL-MCNC: 1.25 NG/DL (ref 0.93–1.7)
TSH SERPL DL<=0.05 MIU/L-ACNC: 6.09 UIU/ML (ref 0.27–4.2)

## 2024-02-06 PROCEDURE — 80048 BASIC METABOLIC PNL TOTAL CA: CPT | Performed by: INTERNAL MEDICINE

## 2024-02-06 PROCEDURE — 83036 HEMOGLOBIN GLYCOSYLATED A1C: CPT | Performed by: INTERNAL MEDICINE

## 2024-02-06 PROCEDURE — 84439 ASSAY OF FREE THYROXINE: CPT | Performed by: INTERNAL MEDICINE

## 2024-02-06 PROCEDURE — 84443 ASSAY THYROID STIM HORMONE: CPT | Performed by: INTERNAL MEDICINE

## 2024-02-06 RX ORDER — TRAZODONE HYDROCHLORIDE 150 MG/1
150 TABLET ORAL NIGHTLY
Qty: 90 TABLET | Refills: 1 | Status: SHIPPED | OUTPATIENT
Start: 2024-02-06

## 2024-02-06 RX ORDER — HYDROCODONE BITARTRATE AND ACETAMINOPHEN 7.5; 325 MG/1; MG/1
1 TABLET ORAL EVERY 12 HOURS PRN
Qty: 40 TABLET | Refills: 0 | OUTPATIENT
Start: 2024-02-06

## 2024-02-06 RX ORDER — HYDROCODONE BITARTRATE AND ACETAMINOPHEN 7.5; 325 MG/1; MG/1
1 TABLET ORAL EVERY 12 HOURS PRN
Qty: 40 TABLET | Refills: 0 | Status: SHIPPED | OUTPATIENT
Start: 2024-02-06 | End: 2024-02-12 | Stop reason: SDUPTHER

## 2024-02-06 RX ORDER — HYDROCODONE BITARTRATE AND ACETAMINOPHEN 7.5; 325 MG/1; MG/1
1 TABLET ORAL EVERY 12 HOURS PRN
Qty: 40 TABLET | Refills: 0 | Status: CANCELLED | OUTPATIENT
Start: 2024-02-06

## 2024-02-06 NOTE — ASSESSMENT & PLAN NOTE
Pulse is in the 50-60 ballpark as of 2/24.  As noted she was switched from diltiazem over to low-dose Lopressor.  She does appear in A-fib still, but obviously controlled.  She is on Xarelto for stroke prevention, continue same.    Looks like she cancelled on Dr Dougherty more than once.

## 2024-02-06 NOTE — ASSESSMENT & PLAN NOTE
Blood pressure chelo well-controlled as of her 2/24 office visit.  Pulse is in the 50s, so she is stable to continue with low-dose metoprolol only.

## 2024-02-06 NOTE — ASSESSMENT & PLAN NOTE
A1c looks better in 10/23, but we need to check it today as of her 2/24 office visit.---> A1c came back at 6.7, that is excellent control, continue just low-dose glimepiride as written.

## 2024-02-06 NOTE — ASSESSMENT & PLAN NOTE
Looks like Chrissie saw her for this and December, and I saw her last month.  She had an MRI listed underneath the primary osteoarthritis heading above.  She saw Dr. Del Toro, got an injection, and follows up with him next week.    Additionally, she is on chronic Xarelto, so she cannot utilize over-the-counter nonsteroidals other than Tylenol, so we will refill hydrocodone at this time.

## 2024-02-06 NOTE — ASSESSMENT & PLAN NOTE
As noted her TSH was up to 6.7 in 10/23.  We deferred treating at that time, did not want to aggravate her A-fib.  If she has trended up higher since then, she would require at least low-dose levothyroxine. (Recall she is on amiodarone.)---> TSH came back at 6.1, I think given the underlying neurocognitive issues, we should start low-dose supplementation now.

## 2024-02-06 NOTE — ASSESSMENT & PLAN NOTE
Labs pending as of her 2/24 OV, further recommendations after that.---> Patient GFR came back at 32, that is down from her baseline of around 40.  She looks prerenal, there is no diuretics on board, will get ordered to the patient that she needs to increase her fluid intake.

## 2024-02-06 NOTE — PROGRESS NOTES
Chief Complaint  Hyperlipidemia, Follow-up (Pt states that this is routine, she didn't have labs), and right hip pain (Pt states that her right has been hurting for two months and that it is getting worse. She states that the more that she walks and steps down it gets worse. She has received an injection from Dr. Del Toro and has an appt with him on 2/13/2024)    Subjective      Hilary Xie presents to [facility]    History of Present Illness:  Patient pleasant 78-year-old female with underlying PAF, DM, osteoporosis on treatment, among others, seen 2/22 as a New Pt, and who is coming in 11/23 for routine 3-month follow-up.  We will review all her routine medications, go over her labs in detail, and address any new concerns as time permits.---> Patient being seen in 1/24 for urgent issue as per chief complaint above.    ---> seen 3/23 for TCM:  COVID-19 pneumonia/multifocal pneumonia  Acute hypoxemic respiratory failure, increased work of breathing, satting 86% on room air  Atrial fibrillation on anticoagulation  Hyperglycemia =   I discussed with the patient that I did not want to send her home with insulin so we would trial glipizide 5 mg twice daily    Review of Systems   Constitutional:  Negative for appetite change, fatigue and fever.   HENT:  Negative for congestion and ear pain.    Eyes:  Negative for blurred vision.   Respiratory:  Negative for cough, chest tightness, shortness of breath and wheezing.    Cardiovascular:  Negative for chest pain, palpitations and leg swelling.   Gastrointestinal:  Negative for abdominal pain.   Genitourinary:  Negative for difficulty urinating, dysuria and hematuria.   Musculoskeletal:  Negative for arthralgias and gait problem.   Skin:  Negative for skin lesions.   Neurological:  Negative for syncope, memory problem and confusion.   Psychiatric/Behavioral:  Negative for self-injury and depressed mood.        Objective   Vital Signs:  "  /58   Pulse 52   Temp 97.3 °F (36.3 °C) (Skin)   Ht 152.4 cm (60\")   Wt 80.2 kg (176 lb 12.8 oz)   SpO2 96%   BMI 34.53 kg/m²           Physical Exam  Vitals and nursing note reviewed.   Constitutional:       General: She is not in acute distress.     Appearance: Normal appearance. She is not toxic-appearing.   HENT:      Head: Atraumatic.      Right Ear: External ear normal.      Left Ear: External ear normal.      Nose: Nose normal.      Mouth/Throat:      Mouth: Mucous membranes are moist.   Eyes:      General:         Right eye: No discharge.         Left eye: No discharge.      Extraocular Movements: Extraocular movements intact.      Pupils: Pupils are equal, round, and reactive to light.   Cardiovascular:      Rate and Rhythm: Normal rate. Rhythm irregular.      Pulses: Normal pulses.      Heart sounds: Normal heart sounds. No murmur heard.     No gallop.      Comments: Heart tones irregularly irregular, no S3.  Pulmonary:      Effort: Pulmonary effort is normal. No respiratory distress.      Breath sounds: No wheezing, rhonchi or rales.      Comments: Lung fields clear bilaterally.  Abdominal:      General: There is no distension.      Palpations: Abdomen is soft. There is no mass.      Tenderness: There is no abdominal tenderness. There is no guarding.      Comments: No epigastric tenderness.   Musculoskeletal:         General: No swelling or tenderness.      Cervical back: No tenderness.      Right lower leg: No edema.      Left lower leg: No edema.   Skin:     General: Skin is warm and dry.      Findings: No rash.   Neurological:      General: No focal deficit present.      Mental Status: She is alert and oriented to person, place, and time. Mental status is at baseline.      Motor: No weakness.      Gait: Gait normal.   Psychiatric:         Mood and Affect: Mood normal.         Thought Content: Thought content normal.          Result Review   The following data was reviewed by: Clarence" MD Mauricio on 02/14/2022:  [x] Laboratory  [] Microbiology  [] Radiology  [] EKG/telemetry  [] Cardiology/Vascular  [] Pathology  [x] Old records             Assessment and Plan   Diagnoses and all orders for this visit:    1. Type 2 diabetes mellitus with hyperglycemia, without long-term current use of insulin (Primary)  Assessment & Plan:  A1c looks better in 10/23, but we need to check it today as of her 2/24 office visit.---> A1c came back at 6.7, that is excellent control, continue just low-dose glimepiride as written.    Orders:  -     Hemoglobin A1c; Future  -     Hemoglobin A1c    2. Primary osteoarthritis involving multiple joints  Overview:  Had MRI of L Hip in 3/21 with tear of labrum.    Plain films 11/23:     There is mild joint space narrowing in both the right and left hip.  There is marginal osteophyte formation present bilaterally, greater on the left.  No fractures are identified.  No destructive bone lesions are seen.               Orders:  -     HYDROcodone-acetaminophen (Norco) 7.5-325 MG per tablet; Take 1 tablet by mouth Every 12 (Twelve) Hours As Needed for Moderate Pain.  Dispense: 40 tablet; Refill: 0    3. Stage 3b chronic kidney disease  Assessment & Plan:  Labs pending as of her 2/24 OV, further recommendations after that.---> Patient GFR came back at 32, that is down from her baseline of around 40.  She looks prerenal, there is no diuretics on board, will get ordered to the patient that she needs to increase her fluid intake.    Orders:  -     Basic Metabolic Panel; Future  -     Basic Metabolic Panel  -     Renal Function Panel; Future    4. Primary hypertension  Assessment & Plan:  Blood pressure chelo well-controlled as of her 2/24 office visit.  Pulse is in the 50s, so she is stable to continue with low-dose metoprolol only.      5. Mixed hyperlipidemia  Assessment & Plan:  As noted, LDL was fine at 89 in 10/23, so she can continue with moderate dose Crestor for now.      6.  Longstanding persistent atrial fibrillation  Overview:  Since 1980's.  Cardioverted x1.  Cardioverted recently as of 11/23 OV, not successful.  Followed by Dr. Chamberlain.      Assessment & Plan:  Pulse is in the 50-60 ballpark as of 2/24.  As noted she was switched from diltiazem over to low-dose Lopressor.  She does appear in A-fib still, but obviously controlled.  She is on Xarelto for stroke prevention, continue same.    Looks like she cancelled on Dr Dougherty more than once.      7. Subclinical hypothyroidism  Assessment & Plan:  As noted her TSH was up to 6.7 in 10/23.  We deferred treating at that time, did not want to aggravate her A-fib.  If she has trended up higher since then, she would require at least low-dose levothyroxine. (Recall she is on amiodarone.)---> TSH came back at 6.1, I think given the underlying neurocognitive issues, we should start low-dose supplementation now.    Orders:  -     TSH; Future  -     T4, free; Future  -     TSH  -     T4, free  -     TSH; Future    8. Left hip pain  Assessment & Plan:  Looks like Chrissie saw her for this and December, and I saw her last month.  She had an MRI listed underneath the primary osteoarthritis heading above.  She saw Dr. Del Toro, got an injection, and follows up with him next week.    Additionally, she is on chronic Xarelto, so she cannot utilize over-the-counter nonsteroidals other than Tylenol, so we will refill hydrocodone at this time.      9. 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  Reviewed with her that she should take this about 90 minutes prior to sleep.      Other orders  -     traZODone (DESYREL) 150 MG tablet; Take 1 tablet by mouth Every Night.  Dispense: 90 tablet; Refill: 1  -     levothyroxine (SYNTHROID,  LEVOTHROID) 25 MCG tablet; Take 1 tablet by mouth Daily for 180 days.  Dispense: 90 tablet; Refill: 1                 Follow Up   Return in about 3 months (around 5/6/2024).  Patient was given instructions and counseling regarding her condition or for health maintenance advice. Please see specific information pulled into the AVS if appropriate.

## 2024-02-06 NOTE — PATIENT INSTRUCTIONS
Take 1-1/2 of the 100 mg trazodone 90 minutes prior to sleep.    2.  I sent a prescription for the 150 mg tablet over to your pharmacy, obviously just take 1 of these when you get it.

## 2024-02-06 NOTE — ASSESSMENT & PLAN NOTE
My note from last year made mention of increasing her to 150 mg at night, does not look like the prescription reflects this, will adjust it, discussed with patient to take 1-1/2 tablets going forward.  Reviewed with her that she should take this about 90 minutes prior to sleep.

## 2024-02-07 ENCOUNTER — TELEPHONE (OUTPATIENT)
Dept: INTERNAL MEDICINE | Facility: CLINIC | Age: 79
End: 2024-02-07
Payer: MEDICARE

## 2024-02-08 RX ORDER — LEVOTHYROXINE SODIUM 0.03 MG/1
25 TABLET ORAL DAILY
Qty: 90 TABLET | Refills: 1 | Status: SHIPPED | OUTPATIENT
Start: 2024-02-08 | End: 2024-08-06

## 2024-02-08 NOTE — TELEPHONE ENCOUNTER
Please let them know that her diabetes came back very stable, nothing different to do there, it looks good.    However, her kidney function is a little worse, basically looks like she needs to drink more water.    Additionally, her thyroid remains low, so I sent very low-dose levothyroxine over the pharmacy, take this once a day.    Lastly there is orders in for a nonfasting TSH and renal panel in about 6 weeks, please call for the results.  Otherwise keep the 3-month appointment.

## 2024-02-12 DIAGNOSIS — M15.9 PRIMARY OSTEOARTHRITIS INVOLVING MULTIPLE JOINTS: ICD-10-CM

## 2024-02-12 RX ORDER — HYDROCODONE BITARTRATE AND ACETAMINOPHEN 7.5; 325 MG/1; MG/1
1 TABLET ORAL EVERY 12 HOURS PRN
Qty: 40 TABLET | Refills: 0 | Status: SHIPPED | OUTPATIENT
Start: 2024-02-12

## 2024-02-29 ENCOUNTER — OFFICE VISIT (OUTPATIENT)
Dept: INTERNAL MEDICINE | Facility: CLINIC | Age: 79
End: 2024-02-29
Payer: MEDICARE

## 2024-02-29 VITALS
TEMPERATURE: 97.7 F | DIASTOLIC BLOOD PRESSURE: 70 MMHG | BODY MASS INDEX: 34.79 KG/M2 | WEIGHT: 177.2 LBS | HEIGHT: 60 IN | SYSTOLIC BLOOD PRESSURE: 132 MMHG

## 2024-02-29 DIAGNOSIS — Z23 NEED FOR VACCINATION: ICD-10-CM

## 2024-02-29 DIAGNOSIS — N18.32 STAGE 3B CHRONIC KIDNEY DISEASE: ICD-10-CM

## 2024-02-29 DIAGNOSIS — M25.552 LEFT HIP PAIN: Primary | ICD-10-CM

## 2024-02-29 LAB
ALBUMIN SERPL-MCNC: 3.5 G/DL (ref 3.5–5.2)
ANION GAP SERPL CALCULATED.3IONS-SCNC: 11.5 MMOL/L (ref 5–15)
BUN SERPL-MCNC: 17 MG/DL (ref 8–23)
BUN/CREAT SERPL: 12.7 (ref 7–25)
CALCIUM SPEC-SCNC: 9.1 MG/DL (ref 8.6–10.5)
CHLORIDE SERPL-SCNC: 109 MMOL/L (ref 98–107)
CO2 SERPL-SCNC: 24.5 MMOL/L (ref 22–29)
CREAT SERPL-MCNC: 1.34 MG/DL (ref 0.57–1)
EGFRCR SERPLBLD CKD-EPI 2021: 40.4 ML/MIN/1.73
GLUCOSE SERPL-MCNC: 161 MG/DL (ref 65–99)
PHOSPHATE SERPL-MCNC: 2.6 MG/DL (ref 2.5–4.5)
POTASSIUM SERPL-SCNC: 3.9 MMOL/L (ref 3.5–5.2)
SODIUM SERPL-SCNC: 145 MMOL/L (ref 136–145)

## 2024-02-29 PROCEDURE — 80069 RENAL FUNCTION PANEL: CPT | Performed by: INTERNAL MEDICINE

## 2024-02-29 RX ORDER — HYDROCODONE BITARTRATE AND ACETAMINOPHEN 10; 325 MG/1; MG/1
1 TABLET ORAL EVERY 12 HOURS PRN
Qty: 40 TABLET | Refills: 0 | Status: SHIPPED | OUTPATIENT
Start: 2024-02-29

## 2024-02-29 RX ORDER — HYDROCODONE BITARTRATE AND ACETAMINOPHEN 7.5; 325 MG/1; MG/1
1 TABLET ORAL EVERY 12 HOURS PRN
Qty: 40 TABLET | Refills: 0 | Status: CANCELLED | OUTPATIENT
Start: 2024-02-29

## 2024-02-29 NOTE — PROGRESS NOTES
"Chief Complaint  Pt states that she is needing a refill on Pain management (Pt states that she is out of Hydrocodone today, she states that her left hip is still hurting, she had a hard time walking to the exam room.)    Subjective      Hilary Xie presents to [facility]    History of Present Illness:  Patient pleasant 78-year-old female with underlying PAF, DM, osteoporosis on treatment, among others, seen 2/22 as a New Pt, and who is coming in 2/24 for routine 3-month follow-up.  We will review all her routine medications, go over her labs in detail, and address any new concerns as time permits.---> Patient being seen later in 2/24 for urgent issue as per chief complaint above.    ---> seen 3/23 for TCM:  COVID-19 pneumonia/multifocal pneumonia  Acute hypoxemic respiratory failure, increased work of breathing, satting 86% on room air  Atrial fibrillation on anticoagulation  Hyperglycemia =   I discussed with the patient that I did not want to send her home with insulin so we would trial glipizide 5 mg twice daily    Review of Systems   Constitutional:  Negative for appetite change, fatigue and fever.   HENT:  Negative for congestion and ear pain.    Eyes:  Negative for blurred vision.   Respiratory:  Negative for cough, chest tightness, shortness of breath and wheezing.    Cardiovascular:  Negative for chest pain, palpitations and leg swelling.   Gastrointestinal:  Negative for abdominal pain.   Genitourinary:  Negative for difficulty urinating, dysuria and hematuria.   Musculoskeletal:  Negative for arthralgias and gait problem.   Skin:  Negative for skin lesions.   Neurological:  Negative for syncope, memory problem and confusion.   Psychiatric/Behavioral:  Negative for self-injury and depressed mood.        Objective   Vital Signs:   /70   Temp 97.7 °F (36.5 °C) (Skin)   Ht 152.4 cm (60\")   Wt 80.4 kg (177 lb 3.2 oz)   BMI 34.61 kg/m²           Physical Exam  Vitals " and nursing note reviewed.   Constitutional:       General: She is not in acute distress.     Appearance: Normal appearance. She is not toxic-appearing.   HENT:      Head: Atraumatic.      Right Ear: External ear normal.      Left Ear: External ear normal.      Nose: Nose normal.      Mouth/Throat:      Mouth: Mucous membranes are moist.   Eyes:      General:         Right eye: No discharge.         Left eye: No discharge.      Extraocular Movements: Extraocular movements intact.      Pupils: Pupils are equal, round, and reactive to light.   Cardiovascular:      Rate and Rhythm: Normal rate. Rhythm irregular.      Pulses: Normal pulses.      Heart sounds: Normal heart sounds. No murmur heard.     No gallop.      Comments: Heart tones irregularly irregular, no S3.  Pulmonary:      Effort: Pulmonary effort is normal. No respiratory distress.      Breath sounds: No wheezing, rhonchi or rales.      Comments: Lung fields clear bilaterally.  Abdominal:      General: There is no distension.      Palpations: Abdomen is soft. There is no mass.      Tenderness: There is no abdominal tenderness. There is no guarding.      Comments: No epigastric tenderness.   Musculoskeletal:         General: No swelling or tenderness.      Cervical back: No tenderness.      Right lower leg: No edema.      Left lower leg: No edema.   Skin:     General: Skin is warm and dry.      Findings: No rash.   Neurological:      General: No focal deficit present.      Mental Status: She is alert and oriented to person, place, and time. Mental status is at baseline.      Motor: No weakness.      Gait: Gait normal.   Psychiatric:         Mood and Affect: Mood normal.         Thought Content: Thought content normal.          Result Review   The following data was reviewed by: Clarence Martínez MD on 02/14/2022:  [x] Laboratory  [] Microbiology  [] Radiology  [] EKG/telemetry  [] Cardiology/Vascular  [] Pathology  [x] Old records             Assessment and Plan    Diagnoses and all orders for this visit:    1. Left hip pain (Primary)  Overview:  Had MRI of L Hip in 3/21 with tear of labrum.    Plain films 11/23:     There is mild joint space narrowing in both the right and left hip.  There is marginal osteophyte formation present bilaterally, greater on the left.  No fractures are identified.  No destructive bone lesions are seen.    Assessment & Plan:  This is lingering on as of her urgent visit in 2/24.  As noted, this started in December, she had the MRI as noted above, saw Dr. Del Toro and had injection.  Pain is persisting and affecting her ability to ambulate.  She is on 7.5 of hydrocodone and requiring a pill in half of this at times.  Will go ahead and refill this given her use of Xarelto and await her repeat evaluation by orthopedics here in about 2 weeks.  Discussed with patient that she can not drive when taking narcotics and needs to limit activity that is causing increased pain until seen by orthopedics again. (She assured me she does not drive).    Orders:  -     HYDROcodone-acetaminophen (NORCO)  MG per tablet; Take 1 tablet by mouth Every 12 (Twelve) Hours As Needed for Severe Pain.  Dispense: 40 tablet; Refill: 0    2. Stage 3b chronic kidney disease  Assessment & Plan:  Patient is being seen about 3 weeks later for unrelated issues, but be nice to see that her renal function is recovering, we will go ahead and check that today and make further recommendations.    Orders:  -     Renal Function Panel; Future  -     Renal Function Panel    3. Need for vaccination    Other orders  -     Pneumococcal Conjugate Vaccine 20-Valent (PCV20)                 Follow Up   Return for Next scheduled follow up.  Patient was given instructions and counseling regarding her condition or for health maintenance advice. Please see specific information pulled into the AVS if appropriate.

## 2024-02-29 NOTE — ASSESSMENT & PLAN NOTE
Patient is being seen about 3 weeks later for unrelated issues, but be nice to see that her renal function is recovering, we will go ahead and check that today and make further recommendations.

## 2024-02-29 NOTE — ASSESSMENT & PLAN NOTE
This is lingering on as of her urgent visit in 2/24.  As noted, this started in December, she had the MRI as noted above, saw Dr. Del Toro and had injection.  Pain is persisting and affecting her ability to ambulate.  She is on 7.5 of hydrocodone and requiring a pill in half of this at times.  Will go ahead and refill this given her use of Xarelto and await her repeat evaluation by orthopedics here in about 2 weeks.  Discussed with patient that she can not drive when taking narcotics and needs to limit activity that is causing increased pain until seen by orthopedics again. (She assured me she does not drive).

## 2024-03-04 DIAGNOSIS — F33.41 RECURRENT MAJOR DEPRESSION IN PARTIAL REMISSION: ICD-10-CM

## 2024-03-04 RX ORDER — MEMANTINE HYDROCHLORIDE AND DONEPEZIL HYDROCHLORIDE 14; 10 MG/1; MG/1
1 CAPSULE ORAL NIGHTLY
Qty: 90 CAPSULE | Refills: 1 | Status: SHIPPED | OUTPATIENT
Start: 2024-03-04

## 2024-03-04 RX ORDER — GLIMEPIRIDE 2 MG/1
2 TABLET ORAL
Qty: 90 TABLET | Refills: 1 | Status: SHIPPED | OUTPATIENT
Start: 2024-03-04

## 2024-03-04 RX ORDER — ROSUVASTATIN CALCIUM 20 MG/1
20 TABLET, COATED ORAL DAILY
Qty: 90 TABLET | Refills: 1 | Status: SHIPPED | OUTPATIENT
Start: 2024-03-04

## 2024-03-04 RX ORDER — AMIODARONE HYDROCHLORIDE 200 MG/1
200 TABLET ORAL DAILY
Qty: 30 TABLET | Refills: 5 | Status: CANCELLED | OUTPATIENT
Start: 2024-03-04

## 2024-03-04 RX ORDER — SERTRALINE HYDROCHLORIDE 100 MG/1
150 TABLET, FILM COATED ORAL DAILY
Qty: 135 TABLET | Refills: 1 | Status: SHIPPED | OUTPATIENT
Start: 2024-03-04

## 2024-03-04 RX ORDER — RISEDRONATE SODIUM 150 MG/1
150 TABLET, FILM COATED ORAL
Qty: 3 TABLET | Refills: 1 | Status: SHIPPED | OUTPATIENT
Start: 2024-03-04

## 2024-03-04 RX ORDER — OLANZAPINE 7.5 MG/1
7.5 TABLET, FILM COATED ORAL DAILY
Qty: 90 TABLET | Refills: 1 | Status: SHIPPED | OUTPATIENT
Start: 2024-03-04

## 2024-03-11 DIAGNOSIS — M25.552 LEFT HIP PAIN: ICD-10-CM

## 2024-03-11 RX ORDER — HYDROCODONE BITARTRATE AND ACETAMINOPHEN 10; 325 MG/1; MG/1
1 TABLET ORAL EVERY 12 HOURS PRN
Qty: 40 TABLET | Refills: 0 | OUTPATIENT
Start: 2024-03-11

## 2024-03-11 NOTE — TELEPHONE ENCOUNTER
Caller: Hilary Xie    Relationship: Self    Best call back number: [phone]    Requested Prescriptions:   Requested Prescriptions     Pending Prescriptions Disp Refills    HYDROcodone-acetaminophen (NORCO)  MG per tablet 40 tablet 0     Sig: Take 1 tablet by mouth Every 12 (Twelve) Hours As Needed for Severe Pain.        Pharmacy where request should be sent: [id number] - [place], [place] - [address]  AT [facility] [address] - [phone]  - [phone] FX     Last office visit with prescribing clinician: 2/29/2024   Last telemedicine visit with prescribing clinician: Visit date not found   Next office visit with prescribing clinician: Visit date not found     Additional details provided by patient: PATIENT IS NEEDING REFILLS SENT TO [facility] IN [place] AS WELL AS TRANSFERRING ALL MEDICATIONS TO THE PHARMACY.     Does the patient have less than a 3 day supply:  [x] Yes  [] No    Would you like a call back once the refill request has been completed: [] Yes [x] No    If the office needs to give you a call back, can they leave a voicemail: [] Yes [x] No    Nichelle Zimmerman Rep   03/11/24 15:49 EDT

## 2024-03-12 ENCOUNTER — OFFICE VISIT (OUTPATIENT)
Dept: ORTHOPEDIC SURGERY | Facility: CLINIC | Age: 79
End: 2024-03-12
Payer: MEDICARE

## 2024-03-12 VITALS
HEART RATE: 54 BPM | WEIGHT: 177.25 LBS | HEIGHT: 60 IN | SYSTOLIC BLOOD PRESSURE: 119 MMHG | DIASTOLIC BLOOD PRESSURE: 50 MMHG | BODY MASS INDEX: 34.8 KG/M2 | OXYGEN SATURATION: 90 %

## 2024-03-12 DIAGNOSIS — M16.12 PRIMARY OSTEOARTHRITIS OF LEFT HIP: Primary | ICD-10-CM

## 2024-03-12 DIAGNOSIS — M54.50 LEFT LOW BACK PAIN, UNSPECIFIED CHRONICITY, UNSPECIFIED WHETHER SCIATICA PRESENT: ICD-10-CM

## 2024-03-12 DIAGNOSIS — M25.552 LEFT HIP PAIN: ICD-10-CM

## 2024-03-12 DIAGNOSIS — M16.12 PRIMARY OSTEOARTHRITIS OF LEFT HIP: ICD-10-CM

## 2024-03-12 DIAGNOSIS — M70.62 TROCHANTERIC BURSITIS OF LEFT HIP: Primary | ICD-10-CM

## 2024-03-12 PROCEDURE — 1160F RVW MEDS BY RX/DR IN RCRD: CPT | Performed by: PHYSICIAN ASSISTANT

## 2024-03-12 PROCEDURE — 99213 OFFICE O/P EST LOW 20 MIN: CPT | Performed by: PHYSICIAN ASSISTANT

## 2024-03-12 PROCEDURE — 3074F SYST BP LT 130 MM HG: CPT | Performed by: PHYSICIAN ASSISTANT

## 2024-03-12 PROCEDURE — 1159F MED LIST DOCD IN RCRD: CPT | Performed by: PHYSICIAN ASSISTANT

## 2024-03-12 PROCEDURE — 3078F DIAST BP <80 MM HG: CPT | Performed by: PHYSICIAN ASSISTANT

## 2024-03-12 RX ORDER — HYDROCODONE BITARTRATE AND ACETAMINOPHEN 10; 325 MG/1; MG/1
1 TABLET ORAL EVERY 12 HOURS PRN
Qty: 40 TABLET | Refills: 0 | OUTPATIENT
Start: 2024-03-12

## 2024-03-12 NOTE — PROGRESS NOTES
"Chief Complaint  Pain and Follow-up of the Left Hip    Subjective          History of Present Illness      Hilary Xie is a 78 y.o. female  presents to [facility] for     Patient presents for follow-up evaluation of left hip pain, left hip osteoarthritis, left-sided low back pain she states pain is in the lateral hip in the posterior lateral hip worse with movement, worse with laying on her side.  Dr. Del Toro gave her a bursitis injection which she states did not help and only lasted about a day.  She states her left lateral hip is still painful, worse with range of motion and laying on her side.  Her son is moving her to [facility] where he lives and patient and son state they are trying to establish physicians where they live.      Allergies   Allergen Reactions    Donepezil Nausea Only    Metformin Nausea And Vomiting    Sulfa Antibiotics Hives        Social History     Socioeconomic History    Marital status: Single   Tobacco Use    Smoking status: Never    Smokeless tobacco: Never   Vaping Use    Vaping status: Never Used   Substance and Sexual Activity    Alcohol use: Never    Drug use: Never    Sexual activity: Defer        REVIEW OF SYSTEMS    Constitutional: Awake alert and oriented x3, no acute distress, denies fevers, chills, weight loss  Respiratory: No respiratory distress  Vascular: Brisk cap refill, Intact distal pulses, No cyanosis, compartments soft with no signs or symptoms of compartment syndrome or DVT.   Cardiovascular: Denies chest pain, shortness of breath  Skin: Denies rashes, acute skin changes  Neurologic: Denies headache, loss of consciousness  MSK: Left hip pain/low back pain      Objective   Vital Signs:   /50   Pulse 54   Ht 152.4 cm (60\")   Wt 80.4 kg (177 lb 4 oz)   SpO2 90%   BMI 34.62 kg/m²     Body mass index is 34.62 kg/m².    Physical Exam       Left hip-tender to the lateral hip over the greater trochanter. Positive EHL, FHL, GS " and TA. Sensation intact to all 5 nerves of the foot. Positive pulses. Flexion 85. External Rotation 30. Internal rotation 20. Pain with hip motion. Equal leg lengths.  Pain with straight leg raise.       Procedures    Imaging Results (Most Recent)       None             Result Review :   The following data was reviewed by: TYREE Escalante on 03/12/2024:               Assessment and Plan    Diagnoses and all orders for this visit:    1. Trochanteric bursitis of left hip (Primary)  -     MRI Hip Left Without Contrast; Future  -     MRI Lumbar Spine Without Contrast; Future    2. Primary osteoarthritis of left hip  -     MRI Hip Left Without Contrast; Future  -     MRI Lumbar Spine Without Contrast; Future    3. Left low back pain, unspecified chronicity, unspecified whether sciatica present  -     MRI Lumbar Spine Without Contrast; Future        Discussed diagnosis and treatment options with the patient and her son they were advised patient can continue activity as tolerated we ordered MRI of left hip and lumbar spine for further evaluation patient and son can follow-up after the MRI to review it here but they also state they are trying to establish with orthopedic care in and around the area where the patient is moving to with his peers contact if that is the case follow-up as needed    Call or return if worsening symptoms.    Follow Up   Return for After MRI.  Patient was given instructions and counseling regarding her condition or for health maintenance advice. Please see specific information pulled into the AVS if appropriate.       EMR Dragon/Transcription disclaimer:  Much of this encounter note is an electronic transcription/translation of spoken language to printed text, aka voice recognition.  The electronic translation of spoken language may permit erroneous or at times nonsensical words or phrases to be inadvertently transcribed; although I have reviewed the note for such errors, some may  still exist so please interpret based on surrounding text content.

## 2024-03-12 NOTE — TELEPHONE ENCOUNTER
Patient was given a 20-day supply of medications just 12 days ago.  Additionally she has appointment with a new provider in 7 days.

## 2024-03-12 NOTE — TELEPHONE ENCOUNTER
Caller: Hilary Xie    Relationship: Self    Best call back number: [phone]     Requested Prescriptions:   Requested Prescriptions     Pending Prescriptions Disp Refills    HYDROcodone-acetaminophen (NORCO)  MG per tablet 40 tablet 0     Sig: Take 1 tablet by mouth Every 12 (Twelve) Hours As Needed for Severe Pain.        Pharmacy where request should be sent: [id number] - [place], [place] - [address] AT [facility] - [phone]  - [phone] FX     Last office visit with prescribing clinician: 2/29/2024   Last telemedicine visit with prescribing clinician: Visit date not found   Next office visit with prescribing clinician: Visit date not found     Does the patient have less than a 3 day supply:  [x] Yes  [] No    Would you like a call back once the refill request has been completed: [] Yes [] No    If the office needs to give you a call back, can they leave a voicemail: [] Yes [] No    Nichelle Barr Rep   03/12/24 15:32 EDT       PATIENT IS GOING TO BE SEEING ANOTHER PROVIDER IN [place], [place] IN ABOUT 1 MONTH OR SO.

## 2024-03-19 ENCOUNTER — TELEPHONE (OUTPATIENT)
Dept: FAMILY MEDICINE CLINIC | Facility: CLINIC | Age: 79
End: 2024-03-19

## 2024-03-19 ENCOUNTER — OFFICE VISIT (OUTPATIENT)
Dept: FAMILY MEDICINE CLINIC | Facility: CLINIC | Age: 79
End: 2024-03-19
Payer: MEDICARE

## 2024-03-19 VITALS
TEMPERATURE: 98.6 F | HEIGHT: 60 IN | OXYGEN SATURATION: 94 % | HEART RATE: 56 BPM | SYSTOLIC BLOOD PRESSURE: 122 MMHG | DIASTOLIC BLOOD PRESSURE: 74 MMHG | WEIGHT: 188 LBS | BODY MASS INDEX: 36.91 KG/M2

## 2024-03-19 DIAGNOSIS — I48.11 LONGSTANDING PERSISTENT ATRIAL FIBRILLATION: ICD-10-CM

## 2024-03-19 DIAGNOSIS — E78.2 MIXED HYPERLIPIDEMIA: ICD-10-CM

## 2024-03-19 DIAGNOSIS — M25.552 LEFT HIP PAIN: ICD-10-CM

## 2024-03-19 DIAGNOSIS — F03.90 MAJOR NEUROCOGNITIVE DISORDER: ICD-10-CM

## 2024-03-19 DIAGNOSIS — M81.0 AGE-RELATED OSTEOPOROSIS WITHOUT CURRENT PATHOLOGICAL FRACTURE: ICD-10-CM

## 2024-03-19 DIAGNOSIS — M70.62 TROCHANTERIC BURSITIS OF LEFT HIP: ICD-10-CM

## 2024-03-19 DIAGNOSIS — I48.11 LONGSTANDING PERSISTENT ATRIAL FIBRILLATION: Primary | ICD-10-CM

## 2024-03-19 DIAGNOSIS — I10 PRIMARY HYPERTENSION: ICD-10-CM

## 2024-03-19 DIAGNOSIS — E87.6 HYPOKALEMIA: ICD-10-CM

## 2024-03-19 DIAGNOSIS — E03.8 SUBCLINICAL HYPOTHYROIDISM: ICD-10-CM

## 2024-03-19 DIAGNOSIS — N18.32 STAGE 3B CHRONIC KIDNEY DISEASE: ICD-10-CM

## 2024-03-19 DIAGNOSIS — D50.8 IRON DEFICIENCY ANEMIA SECONDARY TO INADEQUATE DIETARY IRON INTAKE: ICD-10-CM

## 2024-03-19 DIAGNOSIS — F51.04 PSYCHOPHYSIOLOGICAL INSOMNIA: ICD-10-CM

## 2024-03-19 DIAGNOSIS — E11.65 TYPE 2 DIABETES MELLITUS WITH HYPERGLYCEMIA, WITHOUT LONG-TERM CURRENT USE OF INSULIN: ICD-10-CM

## 2024-03-19 PROBLEM — I48.91 ATRIAL FIBRILLATION: Status: RESOLVED | Noted: 2023-10-06 | Resolved: 2024-03-19

## 2024-03-19 RX ORDER — AMIODARONE HYDROCHLORIDE 200 MG/1
200 TABLET ORAL DAILY
Qty: 30 TABLET | Refills: 1 | Status: SHIPPED | OUTPATIENT
Start: 2024-03-19

## 2024-03-19 RX ORDER — POTASSIUM CHLORIDE 20 MEQ/1
20 TABLET, EXTENDED RELEASE ORAL 2 TIMES DAILY
Qty: 90 TABLET | Refills: 1 | Status: SHIPPED | OUTPATIENT
Start: 2024-03-19 | End: 2024-03-19

## 2024-03-19 RX ORDER — TRAZODONE HYDROCHLORIDE 100 MG/1
100 TABLET ORAL NIGHTLY
Qty: 30 TABLET | Refills: 1 | Status: SHIPPED | OUTPATIENT
Start: 2024-03-19

## 2024-03-19 RX ORDER — OLANZAPINE 5 MG/1
5 TABLET ORAL NIGHTLY
Qty: 30 TABLET | Refills: 1 | Status: SHIPPED | OUTPATIENT
Start: 2024-03-19

## 2024-03-19 RX ORDER — POTASSIUM CHLORIDE 750 MG/1
10 TABLET, EXTENDED RELEASE ORAL 2 TIMES DAILY
Qty: 90 TABLET | Refills: 1 | Status: SHIPPED | OUTPATIENT
Start: 2024-03-19

## 2024-03-19 NOTE — ASSESSMENT & PLAN NOTE
Some ongoing concerns of neurocognitive decline which resulted in referral to Dr. Kong at  10/12/2021 where he had initiated on Namzaric which is a combination of memantine and donepezil, some previous concern of the metabolic causing GI upset although does not appear to be doing as much recent.  Additional had been initiated on Zyprexa, although this seemed to have some potential effects negatively on her sleep which was another concern, was never having significant agitated behavior.  Ultimately I would prefer to try to wean down potentially off the Zyprexa and target other mood medications or sleep medication such as Remeron to benefit this pattern.

## 2024-03-19 NOTE — PROGRESS NOTES
Office Note     Name: Hilary Xie    : 1945     MRN: [id number]     Chief Complaint  Establish Care (Left side hip and upper shoulder pain )    Subjective     History of Present Illness:  Hilary Xie is a 78 y.o. female who presents today for establishment of care regarding multiple medical problems.  Patient with longstanding atrial fibrillation on Xarelto, amiodarone and metoprolol, although she is somehow asked only been taking previous diltiazem which was supposed to be discontinued.  Her pulse is notably in the 50s range we talked about stopping the diltiazem in anticipation of follow-up cardiology appointment.  She is past due from her previous provider who had recommendations to follow-up in that regard.  No complaints of palpitations, shortness of breath or chest tightness.  Diabetic control not monitored of any regularity at home but last hemoglobin A1c a month ago was in good range at 6.7%.  Some persisting pattern of difficulty with mood, potentially dementia pattern and sleep over the last couple years which included a 10/12/2021 evaluation by  regarding this dementia pattern where she had been adjusted onto combination memantine and donepezil, and also Zyprexa had been adjusted on for unclear benefits but thought to be related to sleep and just more potentially mood benefits.  Unfortunate does not seem that does help sleep much and I did discuss my hesitancy the medicine as it seems to have some caution with coadministration of amiodarone.  As it has been not clearly beneficial the family is agreeable to trying to wean down the dose and potentially wean off in the future.  Mood wise seems to be fairly stable this time and we did discuss some potential adjustments in medicine in the future but we do not want to make too many adjustments with an initial visit.  Regarding cholesterol, good control on Crestor.  She tolerates well.  Blood pressure checked infrequently but when she does it is in  "good range in the 110s to 120s over 70s.  No lightheadedness or dizziness.  Also progressing problems with her left hip with more pain, limiting somewhat mobility for which she has seen orthopedics in the [facility] area, with reported injection therapy that was equivocally beneficial, they have recently sent some compounding cream to see if they could benefit some of the more superficial pattern such as with trochanteric bursitis but they also plan an MRI imaging which has not yet been completed of the hip.  Patient would be interested in being seen by orthopedics more locally.  Ongoing pattern of pain in the hip which is limiting mobility for which she uses a cane.    Review of Systems    Objective     Past Medical History:   Diagnosis Date    Atrial fibrillation     Hyperlipidemia     Hypertension      Past Surgical History:   Procedure Laterality Date    SPINE SURGERY       Family History   Problem Relation Age of Onset    Cancer Mother     Diabetes Mother     Cancer Father     Heart failure Brother        Vital Signs  /74 (BP Location: Left arm, Patient Position: Sitting, Cuff Size: Adult)   Pulse 56   Temp 98.6 °F (37 °C) (Temporal)   Ht 152.4 cm (60\")   Wt 85.3 kg (188 lb)   SpO2 94%   BMI 36.72 kg/m²   Estimated body mass index is 36.72 kg/m² as calculated from the following:    Height as of this encounter: 152.4 cm (60\").    Weight as of this encounter: 85.3 kg (188 lb).    Physical Exam  Constitutional:       General: She is not in acute distress.     Appearance: Normal appearance. She is not ill-appearing, toxic-appearing or diaphoretic.      Comments: Pleasant, smiling, alert and oriented.   HENT:      Head: Normocephalic and atraumatic.      Right Ear: Tympanic membrane, ear canal and external ear normal.      Left Ear: Tympanic membrane, ear canal and external ear normal.      Nose: Nose normal. No rhinorrhea.      Mouth/Throat:      Mouth: Mucous membranes are moist.      Pharynx: Oropharynx " is clear. No oropharyngeal exudate or posterior oropharyngeal erythema.   Neck:      Vascular: No carotid bruit.      Comments: No thyroid enlargement  Cardiovascular:      Rate and Rhythm: Normal rate and regular rhythm.      Pulses: Normal pulses.      Heart sounds: Normal heart sounds. No murmur heard.     No friction rub. No gallop.   Pulmonary:      Effort: Pulmonary effort is normal. No respiratory distress.      Breath sounds: Normal breath sounds. No stridor. No wheezing.   Abdominal:      General: Abdomen is flat. Bowel sounds are normal. There is no distension.      Palpations: Abdomen is soft.      Tenderness: There is no abdominal tenderness. There is no guarding or rebound.   Musculoskeletal:      Cervical back: Neck supple. No tenderness.      Right lower leg: No edema.      Left lower leg: No edema.      Comments: Patient ambulates slowly using a cane, with a bit of a limp.  Tenderness palpating the left lateral hip, not examined in more detail than that today, in context of her ongoing orthopedic evaluations.   Lymphadenopathy:      Cervical: No cervical adenopathy.   Skin:     General: Skin is warm and dry.      Capillary Refill: Capillary refill takes less than 2 seconds.   Neurological:      General: No focal deficit present.      Mental Status: She is alert and oriented to person, place, and time. Mental status is at baseline.   Psychiatric:         Mood and Affect: Mood normal.         Behavior: Behavior normal.         Thought Content: Thought content normal.                   POCT Results (if applicable):  Results for orders placed or performed in visit on 02/29/24   Renal Function Panel    Specimen: Hand, Left; Blood   Result Value Ref Range    Glucose 161 (H) 65 - 99 mg/dL    BUN 17 8 - 23 mg/dL    Creatinine 1.34 (H) 0.57 - 1.00 mg/dL    Sodium 145 136 - 145 mmol/L    Potassium 3.9 3.5 - 5.2 mmol/L    Chloride 109 (H) 98 - 107 mmol/L    CO2 24.5 22.0 - 29.0 mmol/L    Calcium 9.1 8.6 - 10.5  mg/dL    Albumin 3.5 3.5 - 5.2 g/dL    Phosphorus 2.6 2.5 - 4.5 mg/dL    Anion Gap 11.5 5.0 - 15.0 mmol/L    BUN/Creatinine Ratio 12.7 7.0 - 25.0    eGFR 40.4 (L) >60.0 mL/min/1.73            Assessment and Plan     Diagnoses and all orders for this visit:    1. Longstanding persistent atrial fibrillation (Primary)  Assessment & Plan:  Longstanding diagnosis as best I can determine from previous records, patient is on Xarelto for anticoagulation, although if GFR were decreased in the future consider switching to Eliquis.  Ongoing use of amiodarone 200 mg daily for which patient was initiated through cardiologist although it appears she is past due for follow-up but we will go ahead and and refer locally.  Previous use of Cardizem for rate control but switched to metoprolol XL 25 mg daily as of late 2023, although it appears she has been coadministered in both.  With pulse more in the 50 range on evaluation today I would like to go ahead and have her stop the Cardizem, continue metoprolol, as I have concern for her pulse being too low.  As she is past due for cardiology follow-up I will also refer local cardiology to assess further, with further discretion in that regard.  I am hesitant    Orders:  -     Ambulatory Referral to Cardiology  -     amiodarone (PACERONE) 200 MG tablet; Take 1 tablet by mouth Daily.  Dispense: 30 tablet; Refill: 1    2. Type 2 diabetes mellitus with hyperglycemia, without long-term current use of insulin  Assessment & Plan:  Hemoglobin A1c 2/24/2027 at 6.7% on glimepiride 2 mg every morning, improved comparison 9.0% from 8/23/2023.  Patient previous metformin not chosen recently due to renal function, but I do think it follow-up will be reasonable to try an SGLT2 inhibitor instead of glimepiride which could give some hypoglycemia risk.  For preference to limit too many medication changes on initial visit I will continue the glimepiride unchanged from now but reassess at follow-up  visit.      3. Left hip pain  Assessment & Plan:  Patient with longstanding pattern of hip pain over the last couple years, with recent evaluations by orthopedist in the [facility] with reported injection therapy with equivocal benefit.  Patient is ongoing pain difficulty, had been initiated on Norco 7.5/325 at half a tablet as needed, discussed this is not a medicine I think is beneficial long-term, with more of an acute process, as such I would decline desire to continue this medication, but would defer as per discretion of the orthopedist.  Referral locally at patient request to Dr. Ernst to reassess, notable for planned potential MRI imaging per previous orthopedist Dr. Del Toro.    Orders:  -     Ambulatory Referral to Orthopedic Surgery    4. Trochanteric bursitis of left hip  Assessment & Plan:  , More with her general left hip pain which seems to be very much arthritic in nature, although she has been prescribed from previous orthopedist in the [facility] compounding cream on 3/12/2024, including ibuprofen, gabapentin, baclofen and lidocaine.  I did discuss this may help the trochanteric bursitis pattern as it is more superficial but likely will not benefit the deeper arthritic pattern.      5. Subclinical hypothyroidism  Assessment & Plan:  Patient's most recent TSH from 2/6/2024 at 6.09, free T41.25, and previous 10/6/2023 with TSH 6.7 and free T4 0.98.  Previous provider had hesitation to start levothyroxine to potentially aggravate her A-fib but with underlying neurocognitive issues felt ultimately beneficial to start low-dose levothyroxine 25 mcg daily.  She has been taking for about 6 weeks.  Plan to recheck it when she follows up in a month with general screening blood work.      6. Primary hypertension  Assessment & Plan:  Patient is continued good blood pressure on only low-dose metoprolol XL 25 mg daily, with previous diltiazem being discontinued with lower heart rate.   Follow-up with Liz Machado cardiology being scheduled as of 3/19/2024 visit.  Monitor blood pressure at home regularly.  Advise elevations.      7. Stage 3b chronic kidney disease  Assessment & Plan:  Most recent documented creatinine from 10/6/2023 with creatinine 1.40 GFR 38.3, previous GFR been down to 32 range, as such modest improvement since that time.  Previous periodic use of Lasix but it does not appear she is using that of any regularity, which could exacerbate renal dysfunction.  Patient could be a good candidate for SGLT 2 with Co. benefit of diabetes, consider at future.      8. Psychophysiological insomnia  Assessment & Plan:  Comorbid with mood difficulty as well as some dementia pattern felt to be from evaluation at  in 2021 more chronic cerebrovascular in nature.  Still some ongoing periodic sleep difficulties which Zyprexa had not given any benefit of note.  With potential side effect of that class medicine, especially as it is somewhat contraindicated with the amiodarone dosing, I like to try to wean off Zyprexa and transition to a different regimen.  We can continue the trazodone for now in addition to Zoloft for her mood.  Consider Remeron as an additional medicine the future could benefit mood in addition to sleep difficulty.  Reinforced discussion of good sleep hygiene.    Orders:  -     OLANZapine (ZyPREXA) 5 MG tablet; Take 1 tablet by mouth Every Night.  Dispense: 30 tablet; Refill: 1  -     traZODone (DESYREL) 100 MG tablet; Take 1 tablet by mouth Every Night.  Dispense: 30 tablet; Refill: 1    9. Age-related osteoporosis without current pathological fracture  Assessment & Plan:  Previous physician reports last bone mineral density testing in 2019 for which has been tolerating Actonel monthly without any issues. DEXA scan repeated 8/8/2023 with lumbar spine bone mineral density in a T-score of -0.6, the left femoral neck at -1.3, the total hip at -1.0, consistent with osteopenia  pattern, not compared to previous.  Patient continues on Actonel with improvement compared to previous.  Due to recheck DEXA scan August 2025 to August 2026.      10. Hypokalemia  Assessment & Plan:  Patient was treated hypokalemia but placed on potassium chloride 10 mill equivalent daily due to potential hypokalemic pattern on Zyprexa, this is more preventative in nature.  When we recheck blood work next month we will clarify if this is still necessary.    Orders:  -     Discontinue: potassium chloride (KLOR-CON M20) 20 MEQ CR tablet; Take 1 tablet by mouth 2 (Two) Times a Day.  Dispense: 90 tablet; Refill: 1  -     potassium chloride (KLOR-CON M10) 10 MEQ CR tablet; Take 1 tablet by mouth 2 (Two) Times a Day.  Dispense: 90 tablet; Refill: 1    11. Major neurocognitive disorder  Assessment & Plan:  Some ongoing concerns of neurocognitive decline which resulted in referral to Dr. Kong at  10/12/2021 where he had initiated on Namzaric which is a combination of memantine and donepezil, some previous concern of the metabolic causing GI upset although does not appear to be doing as much recent.  Additional had been initiated on Zyprexa, although this seemed to have some potential effects negatively on her sleep which was another concern, was never having significant agitated behavior.  Ultimately I would prefer to try to wean down potentially off the Zyprexa and target other mood medications or sleep medication such as Remeron to benefit this pattern.      12. Iron deficiency anemia secondary to inadequate dietary iron intake  Assessment & Plan:  8/18/2023 CBC with differential stable with hemoglobin 13.8, hematocrit 40.8, MCV 87.7 compared to previous 13.6, 41.8 and 88.6 respectively on 5/12/2023.  Continue iron and vitamin C coadministration daily, monitor at minimum yearly.      13. Mixed hyperlipidemia  Assessment & Plan:  Good control of dyslipidemia with Crestor 20 mg daily with most recent lipid panel 10/6/2023  with total cholesterol 164, triglycerides 76, HDL 61, LDL 89.  Continue Crestor unchanged.  And healthy diet, good activity level, with monitoring at minimum yearly.             I spent 65 minutes caring for Hilary on this date of service. This time includes time spent by me in the following activities:preparing for the visit, obtaining and/or reviewing a separately obtained history, performing a medically appropriate examination and/or evaluation , counseling and educating the patient/family/caregiver, ordering medications, tests, or procedures, referring and communicating with other health care professionals , and documenting information in the medical record      Follow Up  Return in about 1 month (around 4/19/2024) for Next scheduled follow up.    Jm Madison MD

## 2024-03-19 NOTE — ASSESSMENT & PLAN NOTE
Patient was treated hypokalemia but placed on potassium chloride 10 mill equivalent daily due to potential hypokalemic pattern on Zyprexa, this is more preventative in nature.  When we recheck blood work next month we will clarify if this is still necessary.

## 2024-03-19 NOTE — ASSESSMENT & PLAN NOTE
Comorbid with mood difficulty as well as some dementia pattern felt to be from evaluation at  in 2021 more chronic cerebrovascular in nature.  Still some ongoing periodic sleep difficulties which Zyprexa had not given any benefit of note.  With potential side effect of that class medicine, especially as it is somewhat contraindicated with the amiodarone dosing, I like to try to wean off Zyprexa and transition to a different regimen.  We can continue the trazodone for now in addition to Zoloft for her mood.  Consider Remeron as an additional medicine the future could benefit mood in addition to sleep difficulty.  Reinforced discussion of good sleep hygiene.

## 2024-03-19 NOTE — ASSESSMENT & PLAN NOTE
8/18/2023 CBC with differential stable with hemoglobin 13.8, hematocrit 40.8, MCV 87.7 compared to previous 13.6, 41.8 and 88.6 respectively on 5/12/2023.  Continue iron and vitamin C coadministration daily, monitor at minimum yearly.

## 2024-03-19 NOTE — ASSESSMENT & PLAN NOTE
, More with her general left hip pain which seems to be very much arthritic in nature, although she has been prescribed from previous orthopedist in the [facility] compounding cream on 3/12/2024, including ibuprofen, gabapentin, baclofen and lidocaine.  I did discuss this may help the trochanteric bursitis pattern as it is more superficial but likely will not benefit the deeper arthritic pattern.

## 2024-03-19 NOTE — ASSESSMENT & PLAN NOTE
Most recent documented creatinine from 10/6/2023 with creatinine 1.40 GFR 38.3, previous GFR been down to 32 range, as such modest improvement since that time.  Previous periodic use of Lasix but it does not appear she is using that of any regularity, which could exacerbate renal dysfunction.  Patient could be a good candidate for SGLT 2 with Co. benefit of diabetes, consider at future.

## 2024-03-19 NOTE — ASSESSMENT & PLAN NOTE
Longstanding diagnosis as best I can determine from previous records, patient is on Xarelto for anticoagulation, although if GFR were decreased in the future consider switching to Eliquis.  Ongoing use of amiodarone 200 mg daily for which patient was initiated through cardiologist although it appears she is past due for follow-up but we will go ahead and and refer locally.  Previous use of Cardizem for rate control but switched to metoprolol XL 25 mg daily as of late 2023, although it appears she has been coadministered in both.  With pulse more in the 50 range on evaluation today I would like to go ahead and have her stop the Cardizem, continue metoprolol, as I have concern for her pulse being too low.  As she is past due for cardiology follow-up I will also refer local cardiology to assess further, with further discretion in that regard.  I am hesitant

## 2024-03-19 NOTE — ASSESSMENT & PLAN NOTE
Hemoglobin A1c 2/24/2027 at 6.7% on glimepiride 2 mg every morning, improved comparison 9.0% from 8/23/2023.  Patient previous metformin not chosen recently due to renal function, but I do think it follow-up will be reasonable to try an SGLT2 inhibitor instead of glimepiride which could give some hypoglycemia risk.  For preference to limit too many medication changes on initial visit I will continue the glimepiride unchanged from now but reassess at follow-up visit.

## 2024-03-19 NOTE — ASSESSMENT & PLAN NOTE
Previous physician reports last bone mineral density testing in 2019 for which has been tolerating Actonel monthly without any issues. DEXA scan repeated 8/8/2023 with lumbar spine bone mineral density in a T-score of -0.6, the left femoral neck at -1.3, the total hip at -1.0, consistent with osteopenia pattern, not compared to previous.  Patient continues on Actonel with improvement compared to previous.  Due to recheck DEXA scan August 2025 to August 2026.

## 2024-03-19 NOTE — ASSESSMENT & PLAN NOTE
Patient with longstanding pattern of hip pain over the last couple years, with recent evaluations by orthopedist in the [place] area with reported injection therapy with equivocal benefit.  Patient is ongoing pain difficulty, had been initiated on Norco 7.5/325 at half a tablet as needed, discussed this is not a medicine I think is beneficial long-term, with more of an acute process, as such I would decline desire to continue this medication, but would defer as per discretion of the orthopedist.  Referral locally at patient request to Dr. Ernst to reassess, notable for planned potential MRI imaging per previous orthopedist Dr. Del Toro.

## 2024-03-19 NOTE — ASSESSMENT & PLAN NOTE
Patient's most recent TSH from 2/6/2024 at 6.09, free T41.25, and previous 10/6/2023 with TSH 6.7 and free T4 0.98.  Previous provider had hesitation to start levothyroxine to potentially aggravate her A-fib but with underlying neurocognitive issues felt ultimately beneficial to start low-dose levothyroxine 25 mcg daily.  She has been taking for about 6 weeks.  Plan to recheck it when she follows up in a month with general screening blood work.

## 2024-03-19 NOTE — ASSESSMENT & PLAN NOTE
Patient is continued good blood pressure on only low-dose metoprolol XL 25 mg daily, with previous diltiazem being discontinued with lower heart rate.  Follow-up with [facility] Machado cardiology being scheduled as of 3/19/2024 visit.  Monitor blood pressure at home regularly.  Advise elevations.

## 2024-03-19 NOTE — TELEPHONE ENCOUNTER
Caller: [facility] #[id number] - [place], [place] - [address] AT [facility] [address] [facility] [address] - [phone] PH - [phone] FX    Relationship: Pharmacy    Which medication are you concerned about: CALLED TO GET THE STRENGTH/FREQUENCY  FOR RX    potassium chloride (KLOR-CON M10) 10 MEQ CR tablet   STATED THAT THERE WAS 2 SENT IN

## 2024-03-19 NOTE — ASSESSMENT & PLAN NOTE
Good control of dyslipidemia with Crestor 20 mg daily with most recent lipid panel 10/6/2023 with total cholesterol 164, triglycerides 76, HDL 61, LDL 89.  Continue Crestor unchanged.  And healthy diet, good activity level, with monitoring at minimum yearly.

## 2024-03-20 RX ORDER — AMIODARONE HYDROCHLORIDE 200 MG/1
200 TABLET ORAL DAILY
Qty: 90 TABLET | OUTPATIENT
Start: 2024-03-20

## 2024-03-21 ENCOUNTER — PATIENT ROUNDING (BHMG ONLY) (OUTPATIENT)
Dept: FAMILY MEDICINE CLINIC | Facility: CLINIC | Age: 79
End: 2024-03-21
Payer: MEDICARE

## 2024-03-21 NOTE — PROGRESS NOTES
.A [employer] message has been sent to the patient for patient rounding with [facility].

## 2024-03-25 ENCOUNTER — TELEPHONE (OUTPATIENT)
Dept: ORTHOPEDIC SURGERY | Facility: CLINIC | Age: 79
End: 2024-03-25
Payer: MEDICARE

## 2024-03-25 NOTE — TELEPHONE ENCOUNTER
LEFT VM FOR PATIENT THAT MRI ORDERS HAVE BEEN FAXED TO [facility] AND THEY WILL BE CALL [PATIENT TO SCHEDULED - [phone]

## 2024-04-04 ENCOUNTER — TELEPHONE (OUTPATIENT)
Dept: INTERNAL MEDICINE | Age: 79
End: 2024-04-04
Payer: MEDICARE

## 2024-04-04 NOTE — TELEPHONE ENCOUNTER
Caller: ray faye    Relationship: Emergency Contact    Best call back number: [phone]    What was the call regarding: PATIENT'S DAUGHTER, RAY, IS REQUESTING A CALL FROM CLINICAL STAFF REGARDING MEDICATION THAT PATIENT WAS PRESCRIBED BY SRIDHAR PREVIOUSLY.

## 2024-04-15 ENCOUNTER — TELEPHONE (OUTPATIENT)
Dept: FAMILY MEDICINE CLINIC | Facility: CLINIC | Age: 79
End: 2024-04-15
Payer: MEDICARE

## 2024-04-15 NOTE — TELEPHONE ENCOUNTER
Caller: Hilary Xie    Relationship: Self    Best call back number: [phone]     What medication are you requesting: MEDICATION TO HELP WITH SLEEP    What are your current symptoms: UNABLE TO SLEEP    How long have you been experiencing symptoms: AROUND 3 WEEKS    Have you had these symptoms before:    [x] Yes  [] No    Have you been treated for these symptoms before:   [x] Yes  [] No    If a prescription is needed, what is your preferred pharmacy and phone number: [id number] [place] [address] [address] AT [facility] [address] - [phone]  - [phone] FX     Additional notes: PATIENT STATES THAT FOR THE PAST 3 WEEKS, SHE HAS BEEN HAVING TROUBLE SLEEPING    PATIENT STATES THAT SHE HAD THE SAME ISSUE BACK IN JANUARY, AND WAS GIVEN A MEDICATION, BUT SHE DOES NOT REMEMBER WHAT THE MEDICATION WAS CALLED    PLEASE BE ADVISED

## 2024-04-19 ENCOUNTER — OFFICE VISIT (OUTPATIENT)
Dept: FAMILY MEDICINE CLINIC | Facility: CLINIC | Age: 79
End: 2024-04-19
Payer: MEDICARE

## 2024-04-19 VITALS
DIASTOLIC BLOOD PRESSURE: 60 MMHG | HEIGHT: 60 IN | HEART RATE: 72 BPM | TEMPERATURE: 97.8 F | BODY MASS INDEX: 38.48 KG/M2 | OXYGEN SATURATION: 90 % | SYSTOLIC BLOOD PRESSURE: 110 MMHG | WEIGHT: 196 LBS

## 2024-04-19 DIAGNOSIS — F51.04 PSYCHOPHYSIOLOGICAL INSOMNIA: ICD-10-CM

## 2024-04-19 DIAGNOSIS — N18.32 STAGE 3B CHRONIC KIDNEY DISEASE: ICD-10-CM

## 2024-04-19 DIAGNOSIS — M25.552 LEFT HIP PAIN: ICD-10-CM

## 2024-04-19 DIAGNOSIS — F03.90 MAJOR NEUROCOGNITIVE DISORDER: ICD-10-CM

## 2024-04-19 DIAGNOSIS — E11.65 TYPE 2 DIABETES MELLITUS WITH HYPERGLYCEMIA, WITHOUT LONG-TERM CURRENT USE OF INSULIN: Primary | ICD-10-CM

## 2024-04-19 DIAGNOSIS — I48.11 LONGSTANDING PERSISTENT ATRIAL FIBRILLATION: ICD-10-CM

## 2024-04-19 DIAGNOSIS — I10 PRIMARY HYPERTENSION: ICD-10-CM

## 2024-04-19 DIAGNOSIS — E03.8 SUBCLINICAL HYPOTHYROIDISM: ICD-10-CM

## 2024-04-19 RX ORDER — OLANZAPINE 10 MG/1
10 TABLET ORAL NIGHTLY
Qty: 30 TABLET | Refills: 2 | Status: SHIPPED | OUTPATIENT
Start: 2024-04-19

## 2024-04-19 RX ORDER — HYDROCODONE BITARTRATE AND ACETAMINOPHEN 10; 325 MG/1; MG/1
1 TABLET ORAL EVERY 12 HOURS PRN
Qty: 40 TABLET | Refills: 0 | Status: CANCELLED | OUTPATIENT
Start: 2024-04-19

## 2024-04-19 RX ORDER — AMANTADINE HYDROCHLORIDE 100 MG/1
TABLET ORAL
COMMUNITY
Start: 2024-03-18

## 2024-04-19 NOTE — PROGRESS NOTES
Follow Up Office Visit      Date: 2024   Patient Name: Hilary Xie  : 1945   MRN: [id number]     Chief Complaint:    Chief Complaint   Patient presents with    Insomnia       History of Present Illness: Hilary Xie is a 78 y.o. female who is here today to follow up with multimedical problems.  Patient seen initially to establish care last month and we made some changes.  Regarding some sleeping difficulty, plan to try to wean off of Zyprexa, but 5 mg dosing does not allow her to sleep as well and she would be interested in returning to previous dosing.  There was some preference to try off the medicine as there is some potential conflict with her amiodarone dosing.  Regarding atrial fibrillation, she has pending appoint with [facility] 2024.  Regarding her left hip, she has been referred locally to Dr. Vinicius Ventura with appointment last week, and planned injection therapy in the left hip next week.  I did reinforce again as discussed at her initial visit that I would not recommend long-term consistent use of the hydrocodone as it is for this pain pattern and it sounds like she only periodically flares, as such if Dr. rEnst did not want to continue that on an as-needed basis that we find but I have declined prescribing this at this time, large part is that is being managed by orthopedic.  If this became more chronic, we could consider pain referral down the road.  Related to her diabetic control her last hemoglobin A1c was good at 6.7%, we discussed today tensional transition to a nonsecretagogue medication, she is on glimepiride and I did have caution of hypoglycemia pattern and she would be agreeable to trying something different, we talked about Jardiance as an option.  She has been now taking her thyroid medicine for a couple months and she is agreeable to rechecking her thyroid testing today.    Subjective      Review of Systems:   Review of Systems    I have reviewed the  patients family history, social history, past medical history, past surgical history and have updated it as appropriate.     Medications:     Current Outpatient Medications:     amantadine (SYMMETREL) 100 MG tablet, , Disp: , Rfl:     amiodarone (PACERONE) 200 MG tablet, Take 1 tablet by mouth Daily., Disp: 30 tablet, Rfl: 1    Cholecalciferol (Vitamin D3) 50 MCG (2000 UT) capsule, TAKE 1 CAPSULE BY MOUTH DAILY, Disp: 90 capsule, Rfl: 1    ferrous sulfate (FeroSul) 325 (65 FE) MG tablet, Take 1 tablet by mouth Daily., Disp: 90 tablet, Rfl: 1    HYDROcodone-acetaminophen (NORCO)  MG per tablet, Take 1 tablet by mouth Every 12 (Twelve) Hours As Needed for Severe Pain., Disp: 40 tablet, Rfl: 0    Ibuprofen 3 %, Gabapentin 10 %, Baclofen 2 %, lidocaine 4 %, Apply 1-2 g topically to the appropriate area as directed 3 (Three) to 4 (Four) times daily., Disp: 90 g, Rfl: 5    levothyroxine (SYNTHROID, LEVOTHROID) 25 MCG tablet, Take 1 tablet by mouth Daily for 180 days., Disp: 90 tablet, Rfl: 1    Memantine HCl-Donepezil HCl (Namzaric) 14-10 MG capsule sustained-release 24 hr, Take 1 each by mouth Every Night., Disp: 90 capsule, Rfl: 1    metoprolol tartrate (LOPRESSOR) 25 MG tablet, 1 to 2 tablets twice daily as needed for rapid heart rate, Disp: 60 tablet, Rfl: 1    potassium chloride (KLOR-CON M10) 10 MEQ CR tablet, Take 1 tablet by mouth 2 (Two) Times a Day., Disp: 90 tablet, Rfl: 1    risedronate (ACTONEL) 150 MG tablet, Take 1 tablet by mouth Every 30 (Thirty) Days., Disp: 3 tablet, Rfl: 1    rivaroxaban (XARELTO) 20 MG tablet, Take 1 tablet by mouth Daily With Dinner., Disp: 90 tablet, Rfl: 1    rosuvastatin (CRESTOR) 20 MG tablet, Take 1 tablet by mouth Daily., Disp: 90 tablet, Rfl: 1    sertraline (ZOLOFT) 100 MG tablet, Take 1.5 tablets by mouth Daily., Disp: 135 tablet, Rfl: 1    traZODone (DESYREL) 100 MG tablet, Take 1 tablet by mouth Every Night., Disp: 30 tablet, Rfl: 1    vitamin C (ASCORBIC ACID) 250  "MG tablet, Take 2 tablets by mouth Daily., Disp: , Rfl:     empagliflozin (Jardiance) 10 MG tablet tablet, Take 1 tablet by mouth Daily., Disp: 30 tablet, Rfl: 3    OLANZapine (ZyPREXA) 10 MG tablet, Take 1 tablet by mouth Every Night., Disp: 30 tablet, Rfl: 2    Allergies:   Allergies   Allergen Reactions    Donepezil Nausea Only    Metformin Nausea And Vomiting    Sulfa Antibiotics Hives       Objective     Physical Exam: Please see above  Vital Signs:   Vitals:    04/19/24 1305 04/19/24 1532   BP: 110/60    BP Location: Left arm    Patient Position: Sitting    Cuff Size: Adult    Pulse: 82 72   Temp: 97.8 °F (36.6 °C)    TempSrc: Temporal    SpO2: 90%    Weight: 88.9 kg (196 lb)    Height: 152.4 cm (60\")      Facility age limit for growth %mariza is 20 years.  Body mass index is 38.28 kg/m².    Physical Exam  Constitutional:       General: She is not in acute distress.     Appearance: Normal appearance. She is not ill-appearing, toxic-appearing or diaphoretic.   HENT:      Head: Normocephalic and atraumatic.      Right Ear: Ear canal and external ear normal.      Left Ear: Ear canal and external ear normal.      Ears:      Comments: Mild fluid behind the TMs bilaterally, was clear     Nose: Nose normal. Rhinorrhea present.      Comments: Mild clear rhinorrhea, pale mucosa     Mouth/Throat:      Mouth: Mucous membranes are moist.      Pharynx: Oropharynx is clear. No oropharyngeal exudate or posterior oropharyngeal erythema.   Neck:      Vascular: No carotid bruit.      Comments: No thyroid enlargement  Cardiovascular:      Rate and Rhythm: Normal rate and regular rhythm.      Pulses: Normal pulses.      Heart sounds: Normal heart sounds. No murmur heard.     No friction rub. No gallop.   Pulmonary:      Effort: Pulmonary effort is normal. No respiratory distress.      Breath sounds: Normal breath sounds. No stridor. No wheezing.   Abdominal:      General: Abdomen is flat. Bowel sounds are normal. There is no " distension.      Palpations: Abdomen is soft.      Tenderness: There is no abdominal tenderness. There is no guarding or rebound.   Musculoskeletal:      Cervical back: Neck supple. No tenderness.      Right lower leg: No edema.      Left lower leg: No edema.   Lymphadenopathy:      Cervical: No cervical adenopathy.   Skin:     General: Skin is warm and dry.      Capillary Refill: Capillary refill takes less than 2 seconds.   Neurological:      General: No focal deficit present.      Mental Status: She is alert and oriented to person, place, and time. Mental status is at baseline.   Psychiatric:         Mood and Affect: Mood normal.         Behavior: Behavior normal.         Thought Content: Thought content normal.         Procedures    Results:   Labs:   Hemoglobin A1C   Date Value Ref Range Status   02/06/2024 6.70 (H) 4.80 - 5.60 % Final     TSH   Date Value Ref Range Status   02/06/2024 6.090 (H) 0.270 - 4.200 uIU/mL Final        Imaging:   No valid procedures specified.          Assessment / Plan      Assessment/Plan:   Diagnoses and all orders for this visit:    1. Type 2 diabetes mellitus with hyperglycemia, without long-term current use of insulin (Primary)  Assessment & Plan:  Hemoglobin A1c 2/24/2027 at 6.7% on glimepiride 2 mg every morning, improved comparison 9.0% from 8/23/2023. Patient previous metformin not chosen recently due to renal function.  At this time, despite good hemoglobin A1c, I would like her to stop the glimepiride due to concerns of hypoglycemia especially in an elderly patient.  Switch to Jardiance 10 mg daily which will give additional renal benefit, and could be titrated up to 25 mg dosing in future.  Reassess with hemoglobin A1c at follow-up visit 3 months, sooner as needed.  Continue monitoring glucose regularly, advise elevations.  Check feet daily, eye check at minimum yearly.    Orders:  -     empagliflozin (Jardiance) 10 MG tablet tablet; Take 1 tablet by mouth Daily.   Dispense: 30 tablet; Refill: 3    2. Left hip pain  Assessment & Plan:  Patient with longstanding pattern of hip pain over the last couple years, with recent evaluations by orthopedist in the [place] area with reported injection therapy with equivocal benefit.  With this pain pattern she had been initiated transitionally on Norco 7.5/325 at half a tablet as needed.  I discussed previous and discussed again today that this is not a medicine I think is beneficial long-term, with more of an acute process, as such if she feels this might be needed longer-term she can speak with the orthopedist or potentially consider pain referral in the future. Referral locally at patient request to Dr. Ernst to reassess who saw her mid April 2024., notable for planned potential MRI imaging per previous orthopedist Dr. Del Toro, now transition under the care of Dr. Ernst.  He plans a hip injection for late April 2024.  Keep follow-up with Dr. Ernst.      3. Subclinical hypothyroidism  Assessment & Plan:  Patient's most recent TSH from 2/6/2024 at 6.09, free T41.25, and previous 10/6/2023 with TSH 6.7 and free T4 0.98. Previous provider had hesitation to start levothyroxine to potentially aggravate her A-fib but with underlying neurocognitive issues felt ultimately beneficial to start low-dose levothyroxine 25 mcg daily.  She has not been taking this medicine for about 2 months, such we will recheck TSH and free T4 today 4/19/2024 with management per results.    Orders:  -     TSH; Future  -     T4, Free; Future  -     T4, Free  -     TSH    4. Longstanding persistent atrial fibrillation  Assessment & Plan:  Longstanding diagnosis as best I can determine from previous records, patient is on Xarelto for anticoagulation, although if GFR were decreased in the future consider switching to Eliquis.  Ongoing use of amiodarone 200 mg daily for which patient was initiated through cardiologist although it appears she is past due for  follow-up but we will go ahead and and refer locally.  Previous use of Cardizem for rate control but switched to metoprolol XL 25 mg daily as of late 2023, although it appears she has been coadministered in both.  With pulse more in the 50 range at her initial evaluation 3/19/2024, I did discontinue her Cardizem but continue the metoprolol and her pulse seems to be in a better range now in the 70s to 80s.  As she is past due for cardiology follow-up, and referred locally to [facility] cardiology with pending appointment 4/29/2024.      5. Primary hypertension  Assessment & Plan:  Patient is continued good blood pressure on only low-dose metoprolol XL 25 mg daily, with previous diltiazem being discontinued with lower heart rate at visit 3/19/2024 and despite this her blood pressure continues in good range,/continue unchanged. Follow-up with [facility] cardiology as additional and scheduled for 4/29/2024, keep that visit.  Monitor blood pressure at home regularly. Advise elevations.       6. Stage 3b chronic kidney disease  Assessment & Plan:  Most recent documented creatinine from 2/29/2024 with creatinine 1.34 and GFR 40.4, previous 10/6/2023 with creatinine 1.40 GFR 38.3. Previous periodic use of Lasix but it does not appear she is using that of any regularity, which could exacerbate renal dysfunction.  Patient has been initiated on SGLT2 inhibitor for benefits of kidney disease and diabetes as of 4/19/2024 visit.    Orders:  -     empagliflozin (Jardiance) 10 MG tablet tablet; Take 1 tablet by mouth Daily.  Dispense: 30 tablet; Refill: 3    7. Major neurocognitive disorder  Assessment & Plan:  Some ongoing concerns of neurocognitive decline which resulted in referral to Dr. Kong at  10/12/2021 where he had initiated on Namzaric which is a combination of memantine and donepezil, some previous concern of the medicine causing GI upset, although seems improved recently. Additional had been initiated on  Zyprexa, which appear to be more for sleep pattern but not agitated behavior.  From a sleep perspective the Zyprexa did decrease from 10 mg down to 5 mg dosing from 3/19/2024 because some difficulty sleeping I have adjusted upwards back to the 10 mg dosing.        8. Psychophysiological insomnia  Assessment & Plan:  Comorbid with mood difficulty as well as some dementia pattern felt to be from evaluation at  in 2021 more chronic cerebrovascular in nature. Still some ongoing periodic sleep difficulties with some questionable benefit of Zyprexa and as such at 3/19/2024 visit we decrease Zyprexa from 10 mg down to 5 mg at nighttime but now in retrospect she feels her sleep is been much worse.  As such we will transition back up Zyprexa to 10 mg dosing today on 4/19/2024, although I would not increase further based on potential conflict with the amiodarone dosing.  we will additionally continue on trazodone 100 mg nightly,in addition to Zoloft for her mood.  In the future we could consider Remeron as an additional medicine the future could benefit mood in addition to sleep difficulty. Reinforced discussion of good sleep hygiene     Orders:  -     OLANZapine (ZyPREXA) 10 MG tablet; Take 1 tablet by mouth Every Night.  Dispense: 30 tablet; Refill: 2        Follow Up:   Return in about 3 months (around 7/19/2024) for Medicare Wellness.        Jm Madison MD  [facility]

## 2024-04-19 NOTE — ASSESSMENT & PLAN NOTE
Patient's most recent TSH from 2/6/2024 at 6.09, free T41.25, and previous 10/6/2023 with TSH 6.7 and free T4 0.98. Previous provider had hesitation to start levothyroxine to potentially aggravate her A-fib but with underlying neurocognitive issues felt ultimately beneficial to start low-dose levothyroxine 25 mcg daily.  She has not been taking this medicine for about 2 months, such we will recheck TSH and free T4 today 4/19/2024 with management per results.

## 2024-04-19 NOTE — ASSESSMENT & PLAN NOTE
Comorbid with mood difficulty as well as some dementia pattern felt to be from evaluation at  in 2021 more chronic cerebrovascular in nature. Still some ongoing periodic sleep difficulties with some questionable benefit of Zyprexa and as such at 3/19/2024 visit we decrease Zyprexa from 10 mg down to 5 mg at nighttime but now in retrospect she feels her sleep is been much worse.  As such we will transition back up Zyprexa to 10 mg dosing today on 4/19/2024, although I would not increase further based on potential conflict with the amiodarone dosing.  we will additionally continue on trazodone 100 mg nightly,in addition to Zoloft for her mood.  In the future we could consider Remeron as an additional medicine the future could benefit mood in addition to sleep difficulty. Reinforced discussion of good sleep hygiene

## 2024-04-19 NOTE — ASSESSMENT & PLAN NOTE
Patient is continued good blood pressure on only low-dose metoprolol XL 25 mg daily, with previous diltiazem being discontinued with lower heart rate at visit 3/19/2024 and despite this her blood pressure continues in good range,/continue unchanged. Follow-up with [facility] as additional and scheduled for 4/29/2024, keep that visit.  Monitor blood pressure at home regularly. Advise elevations.

## 2024-04-19 NOTE — ASSESSMENT & PLAN NOTE
Most recent documented creatinine from 2/29/2024 with creatinine 1.34 and GFR 40.4, previous 10/6/2023 with creatinine 1.40 GFR 38.3. Previous periodic use of Lasix but it does not appear she is using that of any regularity, which could exacerbate renal dysfunction.  Patient has been initiated on SGLT2 inhibitor for benefits of kidney disease and diabetes as of 4/19/2024 visit.

## 2024-04-19 NOTE — ASSESSMENT & PLAN NOTE
Longstanding diagnosis as best I can determine from previous records, patient is on Xarelto for anticoagulation, although if GFR were decreased in the future consider switching to Eliquis.  Ongoing use of amiodarone 200 mg daily for which patient was initiated through cardiologist although it appears she is past due for follow-up but we will go ahead and and refer locally.  Previous use of Cardizem for rate control but switched to metoprolol XL 25 mg daily as of late 2023, although it appears she has been coadministered in both.  With pulse more in the 50 range at her initial evaluation 3/19/2024, I did discontinue her Cardizem but continue the metoprolol and her pulse seems to be in a better range now in the 70s to 80s.  As she is past due for cardiology follow-up, and referred locally to [facility] cardiology with pending appointment 4/29/2024.

## 2024-04-19 NOTE — PROGRESS NOTES
Venipuncture Blood Specimen Collection  Venipuncture performed in right arm by Alyssa Cardenas MA with good hemostasis. Patient tolerated the procedure well without complications.   04/19/24   Alyssa Cardenas MA

## 2024-04-19 NOTE — ASSESSMENT & PLAN NOTE
Patient with longstanding pattern of hip pain over the last couple years, with recent evaluations by orthopedist in the [place] area with reported injection therapy with equivocal benefit.  With this pain pattern she had been initiated transitionally on Norco 7.5/325 at half a tablet as needed.  I discussed previous and discussed again today that this is not a medicine I think is beneficial long-term, with more of an acute process, as such if she feels this might be needed longer-term she can speak with the orthopedist or potentially consider pain referral in the future. Referral locally at patient request to Dr. Ersnt to reassess who saw her mid April 2024., notable for planned potential MRI imaging per previous orthopedist Dr. Del Toro, now transition under the care of Dr. Ernst.  He plans a hip injection for late April 2024.  Keep follow-up with Dr. Ernst.

## 2024-04-19 NOTE — ASSESSMENT & PLAN NOTE
Hemoglobin A1c 2/24/2027 at 6.7% on glimepiride 2 mg every morning, improved comparison 9.0% from 8/23/2023. Patient previous metformin not chosen recently due to renal function.  At this time, despite good hemoglobin A1c, I would like her to stop the glimepiride due to concerns of hypoglycemia especially in an elderly patient.  Switch to Jardiance 10 mg daily which will give additional renal benefit, and could be titrated up to 25 mg dosing in future.  Reassess with hemoglobin A1c at follow-up visit 3 months, sooner as needed.  Continue monitoring glucose regularly, advise elevations.  Check feet daily, eye check at minimum yearly.

## 2024-04-19 NOTE — ASSESSMENT & PLAN NOTE
Some ongoing concerns of neurocognitive decline which resulted in referral to Dr. Kong at  10/12/2021 where he had initiated on Namzaric which is a combination of memantine and donepezil, some previous concern of the medicine causing GI upset, although seems improved recently. Additional had been initiated on Zyprexa, which appear to be more for sleep pattern but not agitated behavior.  From a sleep perspective the Zyprexa did decrease from 10 mg down to 5 mg dosing from 3/19/2024 because some difficulty sleeping I have adjusted upwards back to the 10 mg dosing.

## 2024-04-20 LAB
T4 FREE SERPL-MCNC: 2.05 NG/DL (ref 0.82–1.77)
TSH SERPL DL<=0.005 MIU/L-ACNC: 3.36 UIU/ML (ref 0.45–4.5)

## 2024-04-22 ENCOUNTER — TELEPHONE (OUTPATIENT)
Dept: FAMILY MEDICINE CLINIC | Facility: CLINIC | Age: 79
End: 2024-04-22
Payer: MEDICARE

## 2024-04-22 NOTE — TELEPHONE ENCOUNTER
----- Message from Jm Madison MD sent at 4/22/2024  8:10 AM EDT -----  Please speak with patient regarding TSH and free T4 is obtained 4/19/2024.  Results as follows:    TSH now down into normal range at 3.360 with range 0.45-4.5.  The free T4 which is the functional hormone level is actually elevated at 2.05 with normal range 0.82-1.77.  Comparison just over 2 months prior on 2/6/2024 with TSH mildly elevated at 6.090 and free T4 1.25.    Overall based on these findings it appears even the low-dose of levothyroxine 25 mcg daily as giving her more functional hormone than desired, and in context of her atrial fibrillation I think it is most appropriate to discontinue the levothyroxine and I would like to just recheck it again when she follows up in 3 months time.

## 2024-04-23 ENCOUNTER — TELEPHONE (OUTPATIENT)
Dept: FAMILY MEDICINE CLINIC | Facility: CLINIC | Age: 79
End: 2024-04-23
Payer: MEDICARE

## 2024-04-26 ENCOUNTER — TELEPHONE (OUTPATIENT)
Dept: FAMILY MEDICINE CLINIC | Facility: CLINIC | Age: 79
End: 2024-04-26
Payer: MEDICARE

## 2024-04-27 NOTE — TELEPHONE ENCOUNTER
Phone conversation after-hours on-call, regarding this patient of Dr. Jm Madison, patient indicating her blood sugars approximately 400 after having received an intra-articular steroid injection in her hip today.  Her baseline sugars are unknown as she does not check routinely but her hemoglobin A1c in 2/2024 was 6.7%.  She is taking Amaryl 2 mg daily and recently had the addition of Jardiance 10 mg daily.  Advised taking extra Amaryl 2 mg orally this evening, monitoring blood sugars every several hours to ensure does not bottom out which is not likely based upon her elevation of 400 range.  If her blood sugars start dropping below 100 she has been advised to start taking some form of oral glucose to maintain blood sugars in the low 100s.  Advised will likely take at least a day or 2 for blood sugars to start coming down from her injection.  Advised to start monitoring blood sugars at least several times weekly.

## 2024-05-02 RX ORDER — MELOXICAM 7.5 MG/1
7.5 TABLET ORAL EVERY 12 HOURS PRN
Qty: 20 TABLET | OUTPATIENT
Start: 2024-05-02

## 2024-05-07 RX ORDER — FERROUS SULFATE 325(65) MG
1 TABLET ORAL DAILY
Qty: 90 TABLET | Refills: 1 | Status: SHIPPED | OUTPATIENT
Start: 2024-05-07

## 2024-06-06 ENCOUNTER — TELEPHONE (OUTPATIENT)
Dept: CARDIOLOGY | Facility: CLINIC | Age: 79
End: 2024-06-06

## 2024-06-06 DIAGNOSIS — I48.11 LONGSTANDING PERSISTENT ATRIAL FIBRILLATION: ICD-10-CM

## 2024-06-06 RX ORDER — AMIODARONE HYDROCHLORIDE 200 MG/1
200 TABLET ORAL DAILY
Qty: 90 TABLET | Refills: 0 | Status: SHIPPED | OUTPATIENT
Start: 2024-06-06

## 2024-06-06 NOTE — TELEPHONE ENCOUNTER
Caller: Hilary Xie    Relationship: Self    Best call back number:     What is the best time to reach you: ANYTIME    Who are you requesting to speak with (clinical staff, provider,  specific staff member): CLINICAL      What was the call regarding: PT HAD A NEW PT APPT SCHEDULED FOR 6.3.24 AND WAS UNABLE TO MAKE THAT APPT. SCHEDULED 7.15.24 AND PT IS OK WITH THAT DATE. IF PT SHOULD COME IN EARLIER FOR APPT PLEASE CALL PT BACK.

## 2024-06-09 DIAGNOSIS — F51.04 PSYCHOPHYSIOLOGICAL INSOMNIA: ICD-10-CM

## 2024-06-10 RX ORDER — TRAZODONE HYDROCHLORIDE 100 MG/1
100 TABLET ORAL NIGHTLY
Qty: 30 TABLET | Refills: 1 | Status: SHIPPED | OUTPATIENT
Start: 2024-06-10

## 2024-06-13 ENCOUNTER — TELEPHONE (OUTPATIENT)
Dept: FAMILY MEDICINE CLINIC | Facility: CLINIC | Age: 79
End: 2024-06-13
Payer: MEDICARE

## 2024-06-13 NOTE — TELEPHONE ENCOUNTER
Caller: PASTORA WITT    Relationship: Emergency Contact    Best call back number:  [phone]     Who are you requesting to speak with (clinical staff, provider,  specific staff member): DR CEDRIC NEELY    What was the call regarding:   PASTORA WANTS PCP TO KNOW THAT [facility] WILL BE REACHING OUT TO HIM FOR A RX FOR INCONTINENCE SUPPLIES.    PLEASE WATCH FOR THAT REQUEST AND COMPLETE ASAP AS PATIENT HAS BEEN BUYING THEM HERSELF.    THANK YOU!

## 2024-06-17 ENCOUNTER — TELEPHONE (OUTPATIENT)
Dept: FAMILY MEDICINE CLINIC | Facility: CLINIC | Age: 79
End: 2024-06-17
Payer: MEDICARE

## 2024-06-17 NOTE — TELEPHONE ENCOUNTER
We have gotten a request for incontinence supplies from [facility] urology. I have let her know that she will need an appt with dr. Oneal to discuss this due to this not being mentioned in any office note.   She has an appt on 7/22/2024 and she states she will wait till that appt to discuss. TF

## 2024-06-17 NOTE — TELEPHONE ENCOUNTER
Dr. Oneal states she will need an appt for this. I have spoke with her and she wants to wait until her appt on 7/22/2024. TF

## 2024-06-26 DIAGNOSIS — E87.6 HYPOKALEMIA: ICD-10-CM

## 2024-06-26 RX ORDER — POTASSIUM CHLORIDE 750 MG/1
10 TABLET, EXTENDED RELEASE ORAL 2 TIMES DAILY
Qty: 90 TABLET | Refills: 1 | Status: SHIPPED | OUTPATIENT
Start: 2024-06-26

## 2024-07-15 ENCOUNTER — OFFICE VISIT (OUTPATIENT)
Dept: CARDIOLOGY | Facility: CLINIC | Age: 79
End: 2024-07-15
Payer: MEDICARE

## 2024-07-15 VITALS
HEIGHT: 62 IN | HEART RATE: 84 BPM | WEIGHT: 171 LBS | DIASTOLIC BLOOD PRESSURE: 64 MMHG | BODY MASS INDEX: 31.47 KG/M2 | OXYGEN SATURATION: 96 % | SYSTOLIC BLOOD PRESSURE: 124 MMHG

## 2024-07-15 DIAGNOSIS — I10 HYPERTENSION, ESSENTIAL: ICD-10-CM

## 2024-07-15 DIAGNOSIS — I48.0 PAROXYSMAL ATRIAL FIBRILLATION: Primary | ICD-10-CM

## 2024-07-15 RX ORDER — GLIMEPIRIDE 2 MG/1
2 TABLET ORAL
COMMUNITY
Start: 2024-06-12 | End: 2024-07-22

## 2024-07-15 RX ORDER — TRAMADOL HYDROCHLORIDE 50 MG/1
1 TABLET ORAL EVERY 12 HOURS SCHEDULED
COMMUNITY
Start: 2024-07-13

## 2024-07-15 NOTE — PROGRESS NOTES
[facility] Provider Note     Date:   07/15/2024   Name: Hilary Xie  :   1945  PCP: Jm Madison MD    Chief Complaint   Patient presents with    Establish Care    Atrial Fibrillation       Subjective     History of Present Illness  Hilary Xie is a 79 y.o. female who presents today for new patient for establishing care for a fib. Records rewviewed from prior cardiology.  She was previously cardioverted for A-fib.  She has remained in normal sinus rhythm.  As far as we can tell for the last 6 months or more.  She has been on amiodarone.  We discussed the side effect profile of this and may try to wean it off.  She moved here to be closer to family.  She has no chest pain or shortness of breath.     Cardiology history  1. Paroxysmal atrial fibrillation   -s/p cardioversion Dr. Sanderson 2023 with amiodarone  -[id number]= 4, on eliquis  2. Htn  3. DLD  4. LHC .    Allergies   Allergen Reactions    Donepezil Nausea Only    Metformin Nausea And Vomiting    Sulfa Antibiotics Hives       Current Outpatient Medications:     amantadine (SYMMETREL) 100 MG tablet, , Disp: , Rfl:     amiodarone (PACERONE) 200 MG tablet, TAKE 1 TABLET BY MOUTH DAILY, Disp: 90 tablet, Rfl: 0    Cholecalciferol (Vitamin D3) 50 MCG ( UT) capsule, TAKE 1 CAPSULE BY MOUTH DAILY, Disp: 90 capsule, Rfl: 1    empagliflozin (Jardiance) 10 MG tablet tablet, Take 1 tablet by mouth Daily., Disp: 30 tablet, Rfl: 3    ferrous sulfate (FeroSul) 325 (65 FE) MG tablet, Take 1 tablet by mouth Daily., Disp: 90 tablet, Rfl: 1    glimepiride (AMARYL) 2 MG tablet, Take 1 tablet by mouth Every Morning Before Breakfast., Disp: , Rfl:     HYDROcodone-acetaminophen (NORCO)  MG per tablet, Take 1 tablet by mouth Every 12 (Twelve) Hours As Needed for Severe Pain., Disp: 40 tablet, Rfl: 0    Ibuprofen 3 %, Gabapentin 10 %, Baclofen 2 %, lidocaine 4 %, Apply 1-2 g topically to the appropriate area as directed 3 (Three) to 4  (Four) times daily., Disp: 90 g, Rfl: 5    levothyroxine (SYNTHROID, LEVOTHROID) 25 MCG tablet, Take 1 tablet by mouth Daily for 180 days., Disp: 90 tablet, Rfl: 1    Memantine HCl-Donepezil HCl (Namzaric) 14-10 MG capsule sustained-release 24 hr, Take 1 each by mouth Every Night., Disp: 90 capsule, Rfl: 1    metoprolol tartrate (LOPRESSOR) 25 MG tablet, 1 to 2 tablets twice daily as needed for rapid heart rate, Disp: 60 tablet, Rfl: 1    OLANZapine (ZyPREXA) 10 MG tablet, Take 1 tablet by mouth Every Night., Disp: 30 tablet, Rfl: 2    potassium chloride (KLOR-CON M10) 10 MEQ CR tablet, TAKE 1 TABLET BY MOUTH TWICE DAILY, Disp: 90 tablet, Rfl: 1    risedronate (ACTONEL) 150 MG tablet, Take 1 tablet by mouth Every 30 (Thirty) Days., Disp: 3 tablet, Rfl: 1    rivaroxaban (XARELTO) 20 MG tablet, Take 1 tablet by mouth Daily With Dinner., Disp: 90 tablet, Rfl: 1    rosuvastatin (CRESTOR) 20 MG tablet, Take 1 tablet by mouth Daily., Disp: 90 tablet, Rfl: 1    sertraline (ZOLOFT) 100 MG tablet, Take 1.5 tablets by mouth Daily., Disp: 135 tablet, Rfl: 1    traMADol (ULTRAM) 50 MG tablet, Take 1 tablet by mouth Every 12 (Twelve) Hours., Disp: , Rfl:     traZODone (DESYREL) 100 MG tablet, TAKE 1 TABLET BY MOUTH EVERY NIGHT, Disp: 30 tablet, Rfl: 1    vitamin C (ASCORBIC ACID) 250 MG tablet, Take 2 tablets by mouth Daily., Disp: , Rfl:     Past Medical History:   Diagnosis Date    Atrial fibrillation     Hyperlipidemia     Hypertension       Past Surgical History:   Procedure Laterality Date    SPINE SURGERY       Family History   Problem Relation Age of Onset    Cancer Mother     Diabetes Mother     Cancer Father     Heart failure Brother      Social History     Socioeconomic History    Marital status: Single   Tobacco Use    Smoking status: Never     Passive exposure: Never    Smokeless tobacco: Never   Vaping Use    Vaping status: Never Used   Substance and Sexual Activity    Alcohol use: Never    Drug use: Never    Sexual  "activity: Defer       Objective     Vital Signs:  /64   Pulse 84   Ht 157.5 cm (62\")   Wt 77.6 kg (171 lb)   SpO2 96%   BMI 31.28 kg/m²   Estimated body mass index is 31.28 kg/m² as calculated from the following:    Height as of this encounter: 157.5 cm (62\").    Weight as of this encounter: 77.6 kg (171 lb).         Physical Exam  Vitals reviewed.   Constitutional:       General: She is not in acute distress.     Appearance: Normal appearance.   HENT:      Head: Normocephalic and atraumatic.      Mouth/Throat:      Mouth: Mucous membranes are moist.   Eyes:      Conjunctiva/sclera: Conjunctivae normal.   Neck:      Vascular: No carotid bruit.   Cardiovascular:      Rate and Rhythm: Normal rate and regular rhythm.      Pulses: Normal pulses.      Heart sounds: Normal heart sounds. No murmur heard.  Pulmonary:      Effort: Pulmonary effort is normal. No respiratory distress.      Breath sounds: Normal breath sounds. No wheezing or rhonchi.   Abdominal:      General: Abdomen is flat.      Palpations: Abdomen is soft.   Musculoskeletal:      Cervical back: Normal range of motion and neck supple.      Right lower leg: No edema.      Left lower leg: No edema.   Skin:     General: Skin is warm and dry.      Coloration: Skin is not jaundiced.   Neurological:      General: No focal deficit present.      Mental Status: She is alert and oriented to person, place, and time. Mental status is at baseline.      GCS: GCS eye subscore is 4. GCS verbal subscore is 5. GCS motor subscore is 6.      Cranial Nerves: No cranial nerve deficit.      Motor: No weakness.      Gait: Gait normal.   Psychiatric:         Mood and Affect: Mood and affect normal. Mood is not anxious.         Speech: Speech normal.         Behavior: Behavior normal.                 ECG 12 Lead    Date/Time: 7/15/2024 5:00 PM  Performed by: Vera Ernst MD    Authorized by: Vera Ernst MD  Comparison: compared with previous ECG from " 10/12/2023  Comparison to previous ECG: A fib no longer present  Rhythm: sinus rhythm  Rate: normal  QRS axis: left  Other findings: left ventricular hypertrophy with strain    Clinical impression: abnormal EKG           Assessment and Plan     Diagnoses and all orders for this visit:    1. Paroxysmal atrial fibrillation (Primary)  Comments:  On anticoagulation and amiodarone.  Would like to get her off of amiodarone.  Will look at monitor to see her A-fib burden and may stop this.  Orders:  -     Holter Monitor - 72 Hour Up To 15 Days  -     ECG 12 Lead    2. Hypertension, essential  Comments:  Well-controlled.  Continue current medications.              Follow Up  Return in about 5 months (around 12/15/2024) for Next scheduled follow up.    Vera Ernst MD   07/15/2024     Please note that this explicitly excludes time spent on other separate billable services such as performing procedures or test interpretation, when applicable.    This note was created using dictation software which occasionally transcribes nonsensical phrases. Please contact the provider if any clarification is needed.

## 2024-07-17 ENCOUNTER — PATIENT ROUNDING (BHMG ONLY) (OUTPATIENT)
Dept: CARDIOLOGY | Facility: CLINIC | Age: 79
End: 2024-07-17
Payer: MEDICARE

## 2024-07-17 PROBLEM — I48.0 PAROXYSMAL ATRIAL FIBRILLATION: Status: ACTIVE | Noted: 2023-10-06

## 2024-07-17 NOTE — PROGRESS NOTES
..My name is Roya Negro and I am the Practice Manager for [facility] [place].    I would like to thank you for being a loyal patient. If you do not mind I would like to ask you a few questions about your recent visit with us.  Please feel free to reply if you wish to provide us with feedback on your first visit with our practice.    First, could you tell me what went well with your recent visit?    Secondly, we are always looking for ways to make our patients' experiences even better.  Do you have any recommendations on ways we may improve?    Finally, overall were you satisfied with your first visit to us as a [facility] facility?    In the next few days, you will be receiving a Patient Experience Survey.      Thank you for taking the time to answer a few questions today.  I hope you have a good day.

## 2024-07-22 ENCOUNTER — OFFICE VISIT (OUTPATIENT)
Dept: FAMILY MEDICINE CLINIC | Facility: CLINIC | Age: 79
End: 2024-07-22
Payer: MEDICARE

## 2024-07-22 VITALS
BODY MASS INDEX: 29.44 KG/M2 | OXYGEN SATURATION: 96 % | HEART RATE: 71 BPM | WEIGHT: 160 LBS | HEIGHT: 62 IN | SYSTOLIC BLOOD PRESSURE: 128 MMHG | DIASTOLIC BLOOD PRESSURE: 82 MMHG | TEMPERATURE: 98.9 F

## 2024-07-22 DIAGNOSIS — I10 HYPERTENSION, ESSENTIAL: ICD-10-CM

## 2024-07-22 DIAGNOSIS — F51.04 PSYCHOPHYSIOLOGICAL INSOMNIA: ICD-10-CM

## 2024-07-22 DIAGNOSIS — M81.0 AGE-RELATED OSTEOPOROSIS WITHOUT CURRENT PATHOLOGICAL FRACTURE: ICD-10-CM

## 2024-07-22 DIAGNOSIS — F03.90 MAJOR NEUROCOGNITIVE DISORDER: ICD-10-CM

## 2024-07-22 DIAGNOSIS — E78.2 MIXED HYPERLIPIDEMIA: ICD-10-CM

## 2024-07-22 DIAGNOSIS — E87.6 HYPOKALEMIA: ICD-10-CM

## 2024-07-22 DIAGNOSIS — E11.65 TYPE 2 DIABETES MELLITUS WITH HYPERGLYCEMIA, WITHOUT LONG-TERM CURRENT USE OF INSULIN: ICD-10-CM

## 2024-07-22 DIAGNOSIS — M25.552 LEFT HIP PAIN: ICD-10-CM

## 2024-07-22 DIAGNOSIS — I48.0 PAROXYSMAL ATRIAL FIBRILLATION: ICD-10-CM

## 2024-07-22 DIAGNOSIS — E55.9 VITAMIN D DEFICIENCY: ICD-10-CM

## 2024-07-22 DIAGNOSIS — Z00.01 ENCOUNTER FOR GENERAL ADULT MEDICAL EXAMINATION WITH ABNORMAL FINDINGS: Primary | ICD-10-CM

## 2024-07-22 DIAGNOSIS — F33.41 RECURRENT MAJOR DEPRESSION IN PARTIAL REMISSION: ICD-10-CM

## 2024-07-22 DIAGNOSIS — E03.8 SUBCLINICAL HYPOTHYROIDISM: ICD-10-CM

## 2024-07-22 DIAGNOSIS — N18.32 STAGE 3B CHRONIC KIDNEY DISEASE: ICD-10-CM

## 2024-07-22 DIAGNOSIS — I48.11 LONGSTANDING PERSISTENT ATRIAL FIBRILLATION: ICD-10-CM

## 2024-07-22 DIAGNOSIS — N30.00 ACUTE CYSTITIS WITHOUT HEMATURIA: ICD-10-CM

## 2024-07-22 DIAGNOSIS — D50.8 IRON DEFICIENCY ANEMIA SECONDARY TO INADEQUATE DIETARY IRON INTAKE: ICD-10-CM

## 2024-07-22 DIAGNOSIS — Z23 NEED FOR VACCINATION: ICD-10-CM

## 2024-07-22 DIAGNOSIS — R53.83 OTHER FATIGUE: ICD-10-CM

## 2024-07-22 LAB
BILIRUB BLD-MCNC: NEGATIVE MG/DL
CLARITY, POC: ABNORMAL
COLOR UR: ABNORMAL
EXPIRATION DATE: ABNORMAL
EXPIRATION DATE: NORMAL
GLUCOSE BLDC GLUCOMTR-MCNC: 156 MG/DL (ref 70–130)
GLUCOSE UR STRIP-MCNC: ABNORMAL MG/DL
HBA1C MFR BLD: 5.7 % (ref 4.5–5.7)
KETONES UR QL: NEGATIVE
LEUKOCYTE EST, POC: NEGATIVE
Lab: ABNORMAL
Lab: NORMAL
NITRITE UR-MCNC: NEGATIVE MG/ML
PH UR: 6 [PH] (ref 5–8)
PROT UR STRIP-MCNC: ABNORMAL MG/DL
RBC # UR STRIP: NEGATIVE /UL
SP GR UR: 1.02 (ref 1–1.03)
UROBILINOGEN UR QL: ABNORMAL

## 2024-07-22 PROCEDURE — 1170F FXNL STATUS ASSESSED: CPT | Performed by: INTERNAL MEDICINE

## 2024-07-22 PROCEDURE — 3079F DIAST BP 80-89 MM HG: CPT | Performed by: INTERNAL MEDICINE

## 2024-07-22 PROCEDURE — 82947 ASSAY GLUCOSE BLOOD QUANT: CPT | Performed by: INTERNAL MEDICINE

## 2024-07-22 PROCEDURE — 3044F HG A1C LEVEL LT 7.0%: CPT | Performed by: INTERNAL MEDICINE

## 2024-07-22 PROCEDURE — 1160F RVW MEDS BY RX/DR IN RCRD: CPT | Performed by: INTERNAL MEDICINE

## 2024-07-22 PROCEDURE — 99214 OFFICE O/P EST MOD 30 MIN: CPT | Performed by: INTERNAL MEDICINE

## 2024-07-22 PROCEDURE — 1159F MED LIST DOCD IN RCRD: CPT | Performed by: INTERNAL MEDICINE

## 2024-07-22 PROCEDURE — 83036 HEMOGLOBIN GLYCOSYLATED A1C: CPT | Performed by: INTERNAL MEDICINE

## 2024-07-22 PROCEDURE — 81002 URINALYSIS NONAUTO W/O SCOPE: CPT | Performed by: INTERNAL MEDICINE

## 2024-07-22 PROCEDURE — G0439 PPPS, SUBSEQ VISIT: HCPCS | Performed by: INTERNAL MEDICINE

## 2024-07-22 PROCEDURE — 36415 COLL VENOUS BLD VENIPUNCTURE: CPT | Performed by: INTERNAL MEDICINE

## 2024-07-22 PROCEDURE — 3074F SYST BP LT 130 MM HG: CPT | Performed by: INTERNAL MEDICINE

## 2024-07-22 RX ORDER — HYDROCODONE BITARTRATE AND ACETAMINOPHEN 5; 325 MG/1; MG/1
1 TABLET ORAL EVERY 8 HOURS PRN
Qty: 9 TABLET | Refills: 0 | Status: SHIPPED | OUTPATIENT
Start: 2024-07-22

## 2024-07-22 RX ORDER — NITROFURANTOIN 25; 75 MG/1; MG/1
100 CAPSULE ORAL 2 TIMES DAILY
Qty: 14 CAPSULE | Refills: 0 | Status: SHIPPED | OUTPATIENT
Start: 2024-07-22

## 2024-07-22 RX ORDER — OLANZAPINE 10 MG/1
10 TABLET ORAL NIGHTLY
Qty: 30 TABLET | Refills: 2 | Status: SHIPPED | OUTPATIENT
Start: 2024-07-22

## 2024-07-22 NOTE — PATIENT INSTRUCTIONS
Health Maintenance, Female  Adopting a healthy lifestyle and getting preventive care can go a long way to promote health and wellness. Talk with your health care provider about what schedule of regular examinations is right for you. This is a good chance for you to check in with your provider about disease prevention and staying healthy.  In between checkups, there are plenty of things you can do on your own. Experts have done a lot of research about which lifestyle changes and preventive measures are most likely to keep you healthy. Ask your health care provider for more information.  Weight and diet  Eat a healthy diet  Be sure to include plenty of vegetables, fruits, low-fat dairy products, and lean protein.  Do not eat a lot of foods high in solid fats, added sugars, or salt.  Get regular exercise. This is one of the most important things you can do for your health.  Most adults should exercise for at least 150 minutes each week. The exercise should increase your heart rate and make you sweat (moderate-intensity exercise).  Most adults should also do strengthening exercises at least twice a week. This is in addition to the moderate-intensity exercise.     Maintain a healthy weight  Body mass index (BMI) is a measurement that can be used to identify possible weight problems. It estimates body fat based on height and weight. Your health care provider can help determine your BMI and help you achieve or maintain a healthy weight.  For females 20 years of age and older:  A BMI below 18.5 is considered underweight.  A BMI of 18.5 to 24.9 is normal.  A BMI of 25 to 29.9 is considered overweight.  A BMI of 30 and above is considered obese.     Watch levels of cholesterol and blood lipids  You should start having your blood tested for lipids and cholesterol at 20 years of age, then have this test every 5 years.  You may need to have your cholesterol levels checked more often if:  Your lipid or cholesterol levels are  high.  You are older than 50 years of age.  You are at high risk for heart disease.     Cancer screening  Lung Cancer  Lung cancer screening is recommended for adults 55-80 years old who are at high risk for lung cancer because of a history of smoking.  A yearly low-dose CT scan of the lungs is recommended for people who:  Currently smoke.  Have quit within the past 15 years.  Have at least a 30-pack-year history of smoking. A pack year is smoking an average of one pack of cigarettes a day for 1 year.  Yearly screening should continue until it has been 15 years since you quit.  Yearly screening should stop if you develop a health problem that would prevent you from having lung cancer treatment.     Breast Cancer  Practice breast self-awareness. This means understanding how your breasts normally appear and feel.  It also means doing regular breast self-exams. Let your health care provider know about any changes, no matter how small.  If you are in your 20s or 30s, you should have a clinical breast exam (CBE) by a health care provider every 1-3 years as part of a regular health exam.  If you are 40 or older, have a CBE every year. Also consider having a breast X-ray (mammogram) every year.  If you have a family history of breast cancer, talk to your health care provider about genetic screening.  If you are at high risk for breast cancer, talk to your health care provider about having an MRI and a mammogram every year.  Breast cancer gene (BRCA) assessment is recommended for women who have family members with BRCA-related cancers. BRCA-related cancers include:  Breast.  Ovarian.  Tubal.  Peritoneal cancers.  Results of the assessment will determine the need for genetic counseling and BRCA1 and BRCA2 testing.     Cervical Cancer  Your health care provider may recommend that you be screened regularly for cancer of the pelvic organs (ovaries, uterus, and vagina). This screening involves a pelvic examination, including  checking for microscopic changes to the surface of your cervix (Pap test). You may be encouraged to have this screening done every 3 years, beginning at age 21.  For women ages 30-65, health care providers may recommend pelvic exams and Pap testing every 3 years, or they may recommend the Pap and pelvic exam, combined with testing for human papilloma virus (HPV), every 5 years. Some types of HPV increase your risk of cervical cancer. Testing for HPV may also be done on women of any age with unclear Pap test results.  Other health care providers may not recommend any screening for nonpregnant women who are considered low risk for pelvic cancer and who do not have symptoms. Ask your health care provider if a screening pelvic exam is right for you.  If you have had past treatment for cervical cancer or a condition that could lead to cancer, you need Pap tests and screening for cancer for at least 20 years after your treatment. If Pap tests have been discontinued, your risk factors (such as having a new sexual partner) need to be reassessed to determine if screening should resume. Some women have medical problems that increase the chance of getting cervical cancer. In these cases, your health care provider may recommend more frequent screening and Pap tests.     Colorectal Cancer  This type of cancer can be detected and often prevented.  Routine colorectal cancer screening usually begins at 50 years of age and continues through 75 years of age.  Your health care provider may recommend screening at an earlier age if you have risk factors for colon cancer.  Your health care provider may also recommend using home test kits to check for hidden blood in the stool.  A small camera at the end of a tube can be used to examine your colon directly (sigmoidoscopy or colonoscopy). This is done to check for the earliest forms of colorectal cancer.  Routine screening usually begins at age 50.  Direct examination of the colon should  be repeated every 5-10 years through 75 years of age. However, you may need to be screened more often if early forms of precancerous polyps or small growths are found.     Skin Cancer  Check your skin from head to toe regularly.  Tell your health care provider about any new moles or changes in moles, especially if there is a change in a mole's shape or color.  Also tell your health care provider if you have a mole that is larger than the size of a pencil eraser.  Always use sunscreen. Apply sunscreen liberally and repeatedly throughout the day.  Protect yourself by wearing long sleeves, pants, a wide-brimmed hat, and sunglasses whenever you are outside.     Heart disease, diabetes, and high blood pressure  High blood pressure causes heart disease and increases the risk of stroke. High blood pressure is more likely to develop in:  People who have blood pressure in the high end of the normal range (130-139/85-89 mm Hg).  People who are overweight or obese.  People who are .  If you are 18-39 years of age, have your blood pressure checked every 3-5 years. If you are 40 years of age or older, have your blood pressure checked every year. You should have your blood pressure measured twice--once when you are at a hospital or clinic, and once when you are not at a hospital or clinic. Record the average of the two measurements. To check your blood pressure when you are not at a hospital or clinic, you can use:  An automated blood pressure machine at a pharmacy.  A home blood pressure monitor.  If you are between 55 years and 79 years old, ask your health care provider if you should take aspirin to prevent strokes.  Have regular diabetes screenings. This involves taking a blood sample to check your fasting blood sugar level.  If you are at a normal weight and have a low risk for diabetes, have this test once every three years after 45 years of age.  If you are overweight and have a high risk for diabetes,  consider being tested at a younger age or more often.  Preventing infection  Hepatitis B  If you have a higher risk for hepatitis B, you should be screened for this virus. You are considered at high risk for hepatitis B if:  You were born in a country where hepatitis B is common. Ask your health care provider which countries are considered high risk.  Your parents were born in a high-risk country, and you have not been immunized against hepatitis B (hepatitis B vaccine).  You have HIV or AIDS.  You use needles to inject street drugs.  You live with someone who has hepatitis B.  You have had sex with someone who has hepatitis B.  You get hemodialysis treatment.  You take certain medicines for conditions, including cancer, organ transplantation, and autoimmune conditions.     Hepatitis C  Blood testing is recommended for:  Everyone born from 1945 through 1965.  Anyone with known risk factors for hepatitis C.     Sexually transmitted infections (STIs)  You should be screened for sexually transmitted infections (STIs) including gonorrhea and chlamydia if:  You are sexually active and are younger than 24 years of age.  You are older than 24 years of age and your health care provider tells you that you are at risk for this type of infection.  Your sexual activity has changed since you were last screened and you are at an increased risk for chlamydia or gonorrhea. Ask your health care provider if you are at risk.  If you do not have HIV, but are at risk, it may be recommended that you take a prescription medicine daily to prevent HIV infection. This is called pre-exposure prophylaxis (PrEP). You are considered at risk if:  You are sexually active and do not regularly use condoms or know the HIV status of your partner(s).  You take drugs by injection.  You are sexually active with a partner who has HIV.     Talk with your health care provider about whether you are at high risk of being infected with HIV. If you choose to  begin PrEP, you should first be tested for HIV. You should then be tested every 3 months for as long as you are taking PrEP.  Pregnancy  If you are premenopausal and you may become pregnant, ask your health care provider about preconception counseling.  If you may become pregnant, take 400 to 800 micrograms (mcg) of folic acid every day.  If you want to prevent pregnancy, talk to your health care provider about birth control (contraception).  Osteoporosis and menopause  Osteoporosis is a disease in which the bones lose minerals and strength with aging. This can result in serious bone fractures. Your risk for osteoporosis can be identified using a bone density scan.  If you are 65 years of age or older, or if you are at risk for osteoporosis and fractures, ask your health care provider if you should be screened.  Ask your health care provider whether you should take a calcium or vitamin D supplement to lower your risk for osteoporosis.  Menopause may have certain physical symptoms and risks.  Hormone replacement therapy may reduce some of these symptoms and risks.  Talk to your health care provider about whether hormone replacement therapy is right for you.  Follow these instructions at home:  Schedule regular health, dental, and eye exams.  Stay current with your immunizations.  Do not use any tobacco products including cigarettes, chewing tobacco, or electronic cigarettes.  If you are pregnant, do not drink alcohol.  If you are breastfeeding, limit how much and how often you drink alcohol.  Limit alcohol intake to no more than 1 drink per day for nonpregnant women. One drink equals 12 ounces of beer, 5 ounces of wine, or 1½ ounces of hard liquor.  Do not use street drugs.  Do not share needles.  Ask your health care provider for help if you need support or information about quitting drugs.  Tell your health care provider if you often feel depressed.  Tell your health care provider if you have ever been abused or do  not feel safe at home.  This information is not intended to replace advice given to you by your health care provider. Make sure you discuss any questions you have with your health care provider.  Document Released: 07/02/2012 Document Revised: 05/25/2017 Document Reviewed: 09/20/2016  [employer] Interactive Patient Education © 2018 [employer]

## 2024-07-22 NOTE — ASSESSMENT & PLAN NOTE
Most recent documented creatinine from 2/29/2024 with creatinine 1.34 and GFR 40.4, previous 10/6/2023 with creatinine 1.40 GFR 38.3. Previous periodic use of Lasix but it does not appear she is using that of any regularity, which could exacerbate renal dysfunction.  Patient has been initiated on SGLT2 inhibitor for benefits of kidney disease and diabetes as of 4/19/2024 visit.  Recheck renal function with pending blood work 7/22/2024.

## 2024-07-22 NOTE — ASSESSMENT & PLAN NOTE
Some ongoing concerns of neurocognitive decline which resulted in referral to Dr. Kong at  10/12/2021 where he had initiated on Namzaric which is a combination of memantine and donepezil, some previous concern of the medicine causing GI upset, although seems improved recently. Additional had been initiated on Zyprexa, which appear to be more for sleep pattern but not agitated behavior.  From a sleep perspective the Zyprexa did decrease from 10 mg down to 5 mg dosing from 3/19/2024 because some difficulty sleeping, adjusted back to 10 mg dosing 4/19/2024.  She had a little bit of breakthrough irritability off the Zyprexa accidentally for the last week, resuming should improve.

## 2024-07-22 NOTE — PROGRESS NOTES
Subjective   The ABCs of the Annual Wellness Visit  Medicare Wellness Visit      Hilary Xie is a 79 y.o. patient who presents for a Medicare Wellness Visit.    The following portions of the patient's history were reviewed and   updated as appropriate: allergies, current medications, past family history, past medical history, past social history, past surgical history, and problem list.    Compared to one year ago, the patient's physical   health is the same.  Compared to one year ago, the patient's mental   health is the same.    Recent Hospitalizations:  She was not admitted to the hospital during the last year.     Current Medical Providers:  Patient Care Team:  Jm Madison MD as PCP - General (Internal Medicine)    Outpatient Medications Prior to Visit   Medication Sig Dispense Refill    amantadine (SYMMETREL) 100 MG tablet       amiodarone (PACERONE) 200 MG tablet TAKE 1 TABLET BY MOUTH DAILY 90 tablet 0    Cholecalciferol (Vitamin D3) 50 MCG (2000 UT) capsule TAKE 1 CAPSULE BY MOUTH DAILY 90 capsule 1    empagliflozin (Jardiance) 10 MG tablet tablet Take 1 tablet by mouth Daily. 30 tablet 3    ferrous sulfate (FeroSul) 325 (65 FE) MG tablet Take 1 tablet by mouth Daily. 90 tablet 1    Ibuprofen 3 %, Gabapentin 10 %, Baclofen 2 %, lidocaine 4 % Apply 1-2 g topically to the appropriate area as directed 3 (Three) to 4 (Four) times daily. 90 g 5    Memantine HCl-Donepezil HCl (Namzaric) 14-10 MG capsule sustained-release 24 hr Take 1 each by mouth Every Night. 90 capsule 1    metoprolol tartrate (LOPRESSOR) 25 MG tablet 1 to 2 tablets twice daily as needed for rapid heart rate 60 tablet 1    potassium chloride (KLOR-CON M10) 10 MEQ CR tablet TAKE 1 TABLET BY MOUTH TWICE DAILY 90 tablet 1    risedronate (ACTONEL) 150 MG tablet Take 1 tablet by mouth Every 30 (Thirty) Days. 3 tablet 1    rivaroxaban (XARELTO) 20 MG tablet Take 1 tablet by mouth Daily With Dinner. 90 tablet 1    rosuvastatin (CRESTOR) 20 MG tablet  Take 1 tablet by mouth Daily. 90 tablet 1    sertraline (ZOLOFT) 100 MG tablet Take 1.5 tablets by mouth Daily. 135 tablet 1    traMADol (ULTRAM) 50 MG tablet Take 1 tablet by mouth Every 12 (Twelve) Hours.      traZODone (DESYREL) 100 MG tablet TAKE 1 TABLET BY MOUTH EVERY NIGHT 30 tablet 1    vitamin C (ASCORBIC ACID) 250 MG tablet Take 2 tablets by mouth Daily.      glimepiride (AMARYL) 2 MG tablet Take 1 tablet by mouth Every Morning Before Breakfast.      levothyroxine (SYNTHROID, LEVOTHROID) 25 MCG tablet Take 1 tablet by mouth Daily for 180 days. 90 tablet 1    HYDROcodone-acetaminophen (NORCO)  MG per tablet Take 1 tablet by mouth Every 12 (Twelve) Hours As Needed for Severe Pain. (Patient not taking: Reported on 7/22/2024) 40 tablet 0    OLANZapine (ZyPREXA) 10 MG tablet Take 1 tablet by mouth Every Night. (Patient not taking: Reported on 7/22/2024) 30 tablet 2     No facility-administered medications prior to visit.     Opioid medication/s are on active medication list.  and I have evaluated her active treatment plan and pain score trends (see table).  There were no vitals filed for this visit.  I have reviewed the chart for potential of high risk medication and harmful drug interactions in the elderly.        Aspirin is not on active medication list.  Aspirin use is not indicated based on review of current medical condition/s. Risk of harm outweighs potential benefits.  .    Patient Active Problem List   Diagnosis    Longstanding persistent atrial fibrillation    Hypertension, essential    Recurrent major depression in partial remission    Type 2 diabetes mellitus with hyperglycemia, without long-term current use of insulin    Mixed hyperlipidemia    Iron deficiency anemia secondary to inadequate dietary iron intake    Vitamin D deficiency    Age-related osteoporosis without current pathological fracture    Major neurocognitive disorder    MARY on CPAP    Stage 3b chronic kidney disease     "Subclinical hypothyroidism    Encounter for general adult medical examination with abnormal findings    Paroxysmal atrial fibrillation    Left hip pain    Trochanteric bursitis of left hip    Primary osteoarthritis of left hip    Hypokalemia    Psychophysiological insomnia    Acute cystitis without hematuria     Advance Care Planning (Click this link to access ACP Navigator)  Advance Directive is not on file.  ACP discussion was held with the patient during this visit. Patient does not have an advance directive, information provided.        Objective   Vitals:    24 1313   BP: 128/82   BP Location: Right arm   Patient Position: Sitting   Cuff Size: Adult   Pulse: 71   Temp: 98.9 °F (37.2 °C)   TempSrc: Temporal   SpO2: 96%   Weight: 72.6 kg (160 lb)   Height: 157.5 cm (62\")       Estimated body mass index is 29.26 kg/m² as calculated from the following:    Height as of this encounter: 157.5 cm (62\").    Weight as of this encounter: 72.6 kg (160 lb).             Does the patient have evidence of cognitive impairment? Yes  Lab Results   Component Value Date    HGBA1C 5.7 2024                                                                                                Health  Risk Assessment    Smoking Status:  Social History     Tobacco Use   Smoking Status Never    Passive exposure: Never   Smokeless Tobacco Never     Alcohol Consumption:  Social History     Substance and Sexual Activity   Alcohol Use Never     Fall Risk Screen:  STEADI Fall Risk Assessment was completed, and patient is at MODERATE risk for falls. Assessment completed on:2024    Depression Screenin/22/2024     1:20 PM   PHQ-2/PHQ-9 Depression Screening   Little Interest or Pleasure in Doing Things 0-->not at all   Feeling Down, Depressed or Hopeless 0-->not at all   PHQ-9: Brief Depression Severity Measure Score 0     Health Habits and Functional and Cognitive Screenin/22/2024     1:22 PM   Functional & Cognitive " Status   Do you have difficulty preparing food and eating? Yes   Do you have difficulty bathing yourself, getting dressed or grooming yourself? Yes   Do you have difficulty using the toilet? Yes   Do you have difficulty moving around from place to place? Yes   Do you have trouble with steps or getting out of a bed or a chair? Yes   Current Diet Limited Junk Food   Dental Exam Not up to date   Eye Exam Not up to date   Exercise (times per week) 0 times per week   Current Exercises Include No Regular Exercise   Do you need help using the phone?  No   Are you deaf or do you have serious difficulty hearing?  No   Do you need help to go to places out of walking distance? Yes   Do you need help shopping? Yes   Do you need help preparing meals?  Yes   Do you need help with housework?  Yes   Do you need help with laundry? Yes   Do you need help taking your medications? Yes   Do you need help managing money? Yes   Do you ever drive or ride in a car without wearing a seat belt? No   Have you felt unusual stress, anger or loneliness in the last month? Yes   Who do you live with? Child   If you need help, do you have trouble finding someone available to you? Yes   Have you been bothered in the last four weeks by sexual problems? No   Do you have difficulty concentrating, remembering or making decisions? Yes             Age-appropriate Screening Schedule:  Refer to the list below for future screening recommendations based on patient's age, sex and/or medical conditions. Orders for these recommended tests are listed in the plan section. The patient has been provided with a written plan.    Health Maintenance List  Health Maintenance   Topic Date Due    TDAP/TD VACCINES (1 - Tdap) Never done    ZOSTER VACCINE (1 of 2) Never done    RSV Vaccine - Adults (1 - 1-dose 60+ series) Never done    DIABETIC EYE EXAM  02/09/2022    URINE MICROALBUMIN  07/10/2024    LIPID PANEL  10/06/2024    BMI FOLLOWUP  11/27/2024    HEMOGLOBIN A1C   01/22/2025    ANNUAL WELLNESS VISIT  07/22/2025    DXA SCAN  08/08/2025    Pneumococcal Vaccine 65+  Completed    COVID-19 Vaccine  Discontinued    INFLUENZA VACCINE  Discontinued                                                                                                                                                CMS Preventative Services Quick Reference  Risk Factors Identified During Encounter  Chronic Pain: Natural history and expected course discussed. Questions answered.  Fall Risk-High or Moderate: Discussed Fall Prevention in the home  Inactivity/Sedentary: Patient was advised to exercise at least 150 minutes a week per CDC recommendations.  Polypharmacy: Medication List reviewed, Medications are appropriate for patient, and Medication changes were made    The above risks/problems have been discussed with the patient.  Pertinent information has been shared with the patient in the After Visit Summary.  An After Visit Summary and PPPS were made available to the patient.    Follow Up:   Next Medicare Wellness visit to be scheduled in 1 year.         Additional E&M Note during same encounter follows:  Patient has additional, significant, and separately identifiable condition(s)/problem(s) that require work above and beyond the Medicare Wellness Visit     Chief Complaint  Annual Exam (Discuss incontinence with patient due to areoflow requesting records. Need history. )    Subjective   HPI  Hilary is also being seen today for an annual adult preventative physical exam.  and Hilary is also being seen today for additional medical problem/s.  In addition to wellness visit, discussion and review of multimedical problems with initiation medicines.  Atrial fibrillation managed through [facility] cardiology with plan for Dr. Ernst to potentially assess her atrial fibrillation burden and consider weaning amiodarone as she prefer her off.  Blood pressure not checked of any regularity when it is done it is in good  "range.  Regarding diabetic control she somehow continue the glimepiride despite plan to discontinue last visit but they are taking the Jardiance and tolerating.  Not checking sugars of any regularity when they do it is in the low 100s.  Regarding left hip pain and trochanteric bursitis pain she follows up with Dr. Ernst with first appointment in a few days time with breakthrough hip pain with recent activities which is somewhat limiting, requesting short course of Norco as she had previous that helped her to become a bit more functional.  I have agreed to a few day course to help this transition.  Regarding subclinical hypothyroidism, recheck of thyroid testing off of the medicine in April 2024 was in good range with discontinue levothyroxine since.  Regarding kidney is taking Jardiance with benefit.  She has also had some slight confusion last few days which is gradual progressing, coinciding with some stronger smell to urine, she also has been out of her Zyprexa accidentally for the last week which could be contributing to somewhat of her sleep pattern and some agitation.  No fevers or chills.  Energy otherwise at baseline.  Regarding sleep, still seeing benefit of her regimen of medicine.              Objective   Vital Signs:  /82 (BP Location: Right arm, Patient Position: Sitting, Cuff Size: Adult)   Pulse 71   Temp 98.9 °F (37.2 °C) (Temporal)   Ht 157.5 cm (62\")   Wt 72.6 kg (160 lb)   SpO2 96%   BMI 29.26 kg/m²   Physical Exam  Constitutional:       General: She is not in acute distress.     Appearance: Normal appearance. She is not ill-appearing, toxic-appearing or diaphoretic.      Comments: Patient pleasant, nontoxic.  Not alert and oriented to place or time.  In wheelchair during exam.   HENT:      Head: Normocephalic and atraumatic.      Right Ear: Ear canal and external ear normal.      Left Ear: Ear canal and external ear normal.      Ears:      Comments: Mild fluid by the TMs bilaterally " otherwise clear     Nose: Nose normal. Rhinorrhea present.      Comments: Mild clear rhinorrhea     Mouth/Throat:      Mouth: Mucous membranes are moist.      Pharynx: Oropharynx is clear. No oropharyngeal exudate or posterior oropharyngeal erythema.   Eyes:      Extraocular Movements: Extraocular movements intact.      Conjunctiva/sclera: Conjunctivae normal.      Pupils: Pupils are equal, round, and reactive to light.   Neck:      Vascular: No carotid bruit.   Cardiovascular:      Rate and Rhythm: Normal rate and regular rhythm.      Pulses: Normal pulses.      Heart sounds: Normal heart sounds. No murmur heard.     No friction rub. No gallop.   Pulmonary:      Effort: Pulmonary effort is normal. No respiratory distress.      Breath sounds: Normal breath sounds. No stridor. No wheezing.   Abdominal:      General: Abdomen is flat. Bowel sounds are normal. There is no distension.      Palpations: Abdomen is soft. There is no mass.      Tenderness: There is no abdominal tenderness. There is no guarding or rebound.      Hernia: No hernia is present.   Genitourinary:     Comments: Deferred by family  Musculoskeletal:      Cervical back: Neck supple. No tenderness.      Right lower leg: No edema.      Left lower leg: No edema.   Lymphadenopathy:      Cervical: No cervical adenopathy.   Skin:     General: Skin is warm and dry.      Capillary Refill: Capillary refill takes less than 2 seconds.   Neurological:      General: No focal deficit present.      Mental Status: She is alert and oriented to person, place, and time. Mental status is at baseline.   Psychiatric:         Mood and Affect: Mood normal.         Behavior: Behavior normal.      Comments: Patient not oriented significantly, easily confused, although answers simple questions without difficulty.  Pleasant smiling, no pain pattern appreciated.                 Assessment and Plan               Encounter for general adult medical examination with abnormal  findings  Screening bloodwork completed 10/6/2023, repeat obtained 7/22/2024 with management result.  Patient and family reason declines mammogram, Pap smear and colonoscopy in context of age and health status.  DEXA scan 8/8/2023 with osteoporosis pattern.  Pneumococcal 20 valent vaccine given 2/29/2024.  Tdap vaccine recommended, family plans to obtain at follow-up visit.  Per previous physician records, patient had a EGD 2021 with no significant findings and comments anemia.  Colonoscopy 2019 was negative with no more recommended.    Age-related osteoporosis without current pathological fracture  Previous physician reports last bone mineral density testing in 2019 for which has been tolerating Actonel monthly without any issues. DEXA scan repeated 8/8/2023 with lumbar spine bone mineral density in a T-score of -0.6, the left femoral neck at -1.3, the total hip at -1.0, consistent with osteopenia pattern, not compared to previous.  Patient continues on Actonel with improvement compared to previous.  Due to recheck DEXA scan August 2025 to August 2026.   Hypertension, essential  Patient is continued good blood pressure on only low-dose metoprolol XL 25 mg daily, with previous diltiazem being discontinued with lower heart rate at visit 3/19/2024 and despite this her blood pressure continues in good range,/continue unchanged. Follow-up with [facility] regularly, keep regular follow-ups.  No will need repeat EKG as managed to cardiology.  Monitor blood pressure at home regularly. Advise elevations.   Hypokalemia  Patient was treated hypokalemia but placed on potassium chloride 10 mill equivalent daily due to potential hypokalemic pattern on Zyprexa, this is more preventative in nature. When we recheck blood work next month we will clarify with pending blood work.  Iron deficiency anemia secondary to inadequate dietary iron intake  8/18/2023 CBC with differential stable with hemoglobin 13.8, hematocrit  40.8, MCV 87.7 compared to previous 13.6, 41.8 and 88.6 respectively on 5/12/2023. Continue iron and vitamin C coadministration daily.  Blood counts pending with blood work 7/22/2024, management per results.  Left hip pain  Patient with longstanding pattern of hip pain over the last couple years, with recent evaluations by orthopedist in the [place] area with reported injection therapy with equivocal benefit.  With this pain pattern she had been initiated transitionally on Norco 7.5/325 at half a tablet as needed.  I discussed previous and discussed again today that this is not a medicine I think is beneficial long-term, with more of an acute process, as such if she feels this might be needed longer-term she can speak with the orthopedist or potentially consider pain referral in the future. Referral locally at patient request to Dr. Ernst to reassess.  Previously there was a potential MRI imaging per previous orthopedist Dr. Del Toro, now transition under the care of Dr. Ernst.  I provided Norco 5/325 3 times daily as needed, #9 for some recent flare, to get to the point Dr. Ernst the next days, but further investigations and treatment as per his discretion.  Longstanding persistent atrial fibrillation  Longstanding diagnosis as best I can determine from previous records, patient is on Xarelto for anticoagulation, although if GFR were decreased in the future consider switching to Eliquis.  Ongoing use of amiodarone 200 mg daily, ultimately taken over by [facility] cardiology.  Previous use of Cardizem for rate control but switched to metoprolol XL 25 mg daily as of late 2023, although it appears she has been coadministered in both.  With pulse more in the 50 range at her initial evaluation 3/19/2024, I did discontinue her Cardizem but continue the metoprolol and her pulse seems to be in a better range now in the 70s to 80s.  Keep follow-up with [facility] cardiology, last appointment 7/15/2024,  with consideration to wean off amiodarone if atrial fibrillation burden is deemed to be low.  Major neurocognitive disorder  Some ongoing concerns of neurocognitive decline which resulted in referral to Dr. Kong at  10/12/2021 where he had initiated on Namzaric which is a combination of memantine and donepezil, some previous concern of the medicine causing GI upset, although seems improved recently. Additional had been initiated on Zyprexa, which appear to be more for sleep pattern but not agitated behavior.  From a sleep perspective the Zyprexa did decrease from 10 mg down to 5 mg dosing from 3/19/2024 because some difficulty sleeping, adjusted back to 10 mg dosing 4/19/2024.  She had a little bit of breakthrough irritability off the Zyprexa accidentally for the last week, resuming should improve.  Mixed hyperlipidemia   Good control of dyslipidemia with Crestor 20 mg daily with most recent lipid panel 10/6/2023 with total cholesterol 164, triglycerides 76, HDL 61, LDL 89.  Continue Crestor unchanged.  And healthy diet, good activity level, with monitoring at minimum yearly.  Recheck pending with blood work 7/22/2024.  Paroxysmal atrial fibrillation    Psychophysiological insomnia  Comorbid with mood difficulty as well as some dementia pattern felt to be from evaluation at  in 2021 more chronic cerebrovascular in nature. Still some ongoing periodic sleep difficulties with some questionable benefit of Zyprexa and as such at 3/19/2024 visit we decrease Zyprexa from 10 mg down to 5 mg at nighttime but now in retrospect she feels her sleep is been much worse.  Transition back to Zyprexa 10 mg dosing on 4/19/2024, although I would not increase further based on potential conflict with the amiodarone dosing.  Continue additionally trazodone 100 mg nightly,in addition to Zoloft for her mood.  In the future we could consider Remeron as an additional medicine the future could benefit mood in addition to sleep difficulty.  Reinforced discussion of good sleep hygiene   Recurrent major depression in partial remission  Longstanding pattern for which she is taking Zoloft 150 mg total dosing in addition to Zyprexa for some other neurocognitive difficulties, and trazodone for sleep.  She could be at candidate to consider Remeron in the future.  Overall her mood seems to be stable, continue unchanged.  Continue lifestyle modifications to benefit mood.  Stage 3b chronic kidney disease  Most recent documented creatinine from 2/29/2024 with creatinine 1.34 and GFR 40.4, previous 10/6/2023 with creatinine 1.40 GFR 38.3. Previous periodic use of Lasix but it does not appear she is using that of any regularity, which could exacerbate renal dysfunction.  Patient has been initiated on SGLT2 inhibitor for benefits of kidney disease and diabetes as of 4/19/2024 visit.  Recheck renal function with pending blood work 7/22/2024.  Subclinical hypothyroidism  Notable TSH from 2/6/2024 at 6.09, free T4 1.25, and previous 10/6/2023 with TSH 6.7 and free T4 0.98. Previous provider had hesitation to start levothyroxine to potentially aggravate her A-fib but with underlying neurocognitive issues felt ultimately beneficial to start low-dose levothyroxine 25 mcg daily.  She had not been taking levothyroxine for about 2 months as of check on 4/19/2024, where TSH was normal 3.360 and free T4 normal at 2.05, as such recommending discontinuing the levothyroxine.  Recheck pending with blood work 7/22/2024.  Management per results.  Type 2 diabetes mellitus with hyperglycemia, without long-term current use of insulin  Hemoglobin A1c 2/24/2027 at 6.7% on glimepiride 2 mg every morning, improved comparison 9.0% from 8/23/2023. Patient previous metformin not chosen recently due to renal function.  Plan was to stop glimepiride but somehow discontinued, with today's hemoglobin A1c 7/22/2024 notably lower at 5.7% after addition of Jardiance 10 mg daily.  Again I have  reinforced importance of stopping the glimepiride to avoid hypoglycemia.  Continue Jardiance 10 mg daily, with additional renal benefit, and could be titrated up to 25 mg dosing in future.  Reassess with hemoglobin A1c at follow-up visit 3 months, sooner as needed.  Continue monitoring glucose regularly, advise elevations.  Check feet daily, eye check at minimum yearly.   Vitamin D deficiency  Vitamin D 25-hydroxy level 49 from visit March 2022.  Recheck pending with blood work 7/22/2024, management per results.  Other fatigue    Acute cystitis without hematuria  Pattern of a little bit of confusion and disorientation last few days gradually increasing coinciding with some strong smell urine with urinalysis being very cloudy although otherwise not significant contributary.  Nonetheless very high suspicion for UTI will initiate nitrofurantoin 100 mg twice daily x 7 days.  Urine culture also sent to help delineate any further treatment.  Push fluids.  Advise if not improving.  Need for vaccination      Orders Placed This Encounter   Procedures    Urine Culture - Urine, Urine, Clean Catch     Standing Status:   Future     Number of Occurrences:   1     Standing Expiration Date:   7/22/2025     Order Specific Question:   Release to patient     Answer:   Routine Release [[id number]]    Comprehensive Metabolic Panel     Standing Status:   Future     Number of Occurrences:   1     Standing Expiration Date:   7/22/2025     Order Specific Question:   Release to patient     Answer:   Routine Release [[id number]]    Lipid Panel     Standing Status:   Future     Number of Occurrences:   1     Standing Expiration Date:   7/22/2025     Order Specific Question:   Release to patient     Answer:   Routine Release [[id number]]    TSH     Standing Status:   Future     Number of Occurrences:   1     Standing Expiration Date:   7/22/2025     Order Specific Question:   Release to patient     Answer:   Routine Release [[id number]]    T4,  Free     Standing Status:   Future     Number of Occurrences:   1     Standing Expiration Date:   7/22/2025     Order Specific Question:   Release to patient     Answer:   Routine Release [[id number]]    MicroAlbumin, Urine, Random - Urine, Clean Catch     Standing Status:   Future     Number of Occurrences:   1     Standing Expiration Date:   7/22/2025     Order Specific Question:   Release to patient     Answer:   Routine Release [[id number]]    Vitamin D,25-Hydroxy     Standing Status:   Future     Number of Occurrences:   1     Standing Expiration Date:   7/22/2025     Order Specific Question:   Release to patient     Answer:   Routine Release [[id number]]    POC Urinalysis Dipstick     Order Specific Question:   Release to patient     Answer:   Routine Release [[id number]]    POC Glycosylated Hemoglobin (Hb A1C)     Order Specific Question:   Release to patient     Answer:   Routine Release [[id number]]    POC Glucose, Blood     Order Specific Question:   Release to patient     Answer:   Routine Release [[id number]]    CBC & Differential     Standing Status:   Future     Number of Occurrences:   1     Standing Expiration Date:   7/22/2025     Order Specific Question:   Manual Differential     Answer:   No     Order Specific Question:   Release to patient     Answer:   Routine Release [[id number]]     New Medications Ordered This Visit   Medications    OLANZapine (ZyPREXA) 10 MG tablet     Sig: Take 1 tablet by mouth Every Night.     Dispense:  30 tablet     Refill:  2    HYDROcodone-acetaminophen (Norco) 5-325 MG per tablet     Sig: Take 1 tablet by mouth Every 8 (Eight) Hours As Needed for Moderate Pain or Mild Pain.     Dispense:  9 tablet     Refill:  0    nitrofurantoin, macrocrystal-monohydrate, (Macrobid) 100 MG capsule     Sig: Take 1 capsule by mouth 2 (Two) Times a Day.     Dispense:  14 capsule     Refill:  0          Follow Up   Return in about 3 months (around 10/22/2024) for Next scheduled follow  up.  Patient was given instructions and counseling regarding her condition or for health maintenance advice. Please see specific information pulled into the AVS if appropriate.

## 2024-07-22 NOTE — ASSESSMENT & PLAN NOTE
Hemoglobin A1c 2/24/2027 at 6.7% on glimepiride 2 mg every morning, improved comparison 9.0% from 8/23/2023. Patient previous metformin not chosen recently due to renal function.  Plan was to stop glimepiride but somehow discontinued, with today's hemoglobin A1c 7/22/2024 notably lower at 5.7% after addition of Jardiance 10 mg daily.  Again I have reinforced importance of stopping the glimepiride to avoid hypoglycemia.  Continue Jardiance 10 mg daily, with additional renal benefit, and could be titrated up to 25 mg dosing in future.  Reassess with hemoglobin A1c at follow-up visit 3 months, sooner as needed.  Continue monitoring glucose regularly, advise elevations.  Check feet daily, eye check at minimum yearly.

## 2024-07-22 NOTE — ASSESSMENT & PLAN NOTE
Good control of dyslipidemia with Crestor 20 mg daily with most recent lipid panel 10/6/2023 with total cholesterol 164, triglycerides 76, HDL 61, LDL 89.  Continue Crestor unchanged.  And healthy diet, good activity level, with monitoring at minimum yearly.  Recheck pending with blood work 7/22/2024.

## 2024-07-22 NOTE — ASSESSMENT & PLAN NOTE
Vitamin D 25-hydroxy level 49 from visit March 2022.  Recheck pending with blood work 7/22/2024, management per results.

## 2024-07-22 NOTE — ASSESSMENT & PLAN NOTE
Comorbid with mood difficulty as well as some dementia pattern felt to be from evaluation at  in 2021 more chronic cerebrovascular in nature. Still some ongoing periodic sleep difficulties with some questionable benefit of Zyprexa and as such at 3/19/2024 visit we decrease Zyprexa from 10 mg down to 5 mg at nighttime but now in retrospect she feels her sleep is been much worse.  Transition back to Zyprexa 10 mg dosing on 4/19/2024, although I would not increase further based on potential conflict with the amiodarone dosing.  Continue additionally trazodone 100 mg nightly,in addition to Zoloft for her mood.  In the future we could consider Remeron as an additional medicine the future could benefit mood in addition to sleep difficulty. Reinforced discussion of good sleep hygiene

## 2024-07-22 NOTE — PROGRESS NOTES
Venipuncture Blood Specimen Collection  Venipuncture performed in right arm by Alyssa Cardenas MA with good hemostasis. Patient tolerated the procedure well without complications.   07/22/24   Alyssa Cardenas MA

## 2024-07-22 NOTE — ASSESSMENT & PLAN NOTE
Patient is continued good blood pressure on only low-dose metoprolol XL 25 mg daily, with previous diltiazem being discontinued with lower heart rate at visit 3/19/2024 and despite this her blood pressure continues in good range,/continue unchanged. Follow-up with [facility] regularly, keep regular follow-ups.  No will need repeat EKG as managed to cardiology.  Monitor blood pressure at home regularly. Advise elevations.

## 2024-07-22 NOTE — ASSESSMENT & PLAN NOTE
Screening bloodwork completed 10/6/2023, repeat obtained 7/22/2024 with management result.  Patient and family reason declines mammogram, Pap smear and colonoscopy in context of age and health status.  DEXA scan 8/8/2023 with osteoporosis pattern.  Pneumococcal 20 valent vaccine given 2/29/2024.  Tdap vaccine recommended, family plans to obtain at follow-up visit.  Per previous physician records, patient had a EGD 2021 with no significant findings and comments anemia.  Colonoscopy 2019 was negative with no more recommended.

## 2024-07-22 NOTE — ASSESSMENT & PLAN NOTE
Patient was treated hypokalemia but placed on potassium chloride 10 mill equivalent daily due to potential hypokalemic pattern on Zyprexa, this is more preventative in nature. When we recheck blood work next month we will clarify with pending blood work.

## 2024-07-22 NOTE — ASSESSMENT & PLAN NOTE
Notable TSH from 2/6/2024 at 6.09, free T4 1.25, and previous 10/6/2023 with TSH 6.7 and free T4 0.98. Previous provider had hesitation to start levothyroxine to potentially aggravate her A-fib but with underlying neurocognitive issues felt ultimately beneficial to start low-dose levothyroxine 25 mcg daily.  She had not been taking levothyroxine for about 2 months as of check on 4/19/2024, where TSH was normal 3.360 and free T4 normal at 2.05, as such recommending discontinuing the levothyroxine.  Recheck pending with blood work 7/22/2024.  Management per results.

## 2024-07-22 NOTE — ASSESSMENT & PLAN NOTE
Pattern of a little bit of confusion and disorientation last few days gradually increasing coinciding with some strong smell urine with urinalysis being very cloudy although otherwise not significant contributary.  Nonetheless very high suspicion for UTI will initiate nitrofurantoin 100 mg twice daily x 7 days.  Urine culture also sent to help delineate any further treatment.  Push fluids.  Advise if not improving.

## 2024-07-22 NOTE — ASSESSMENT & PLAN NOTE
Longstanding pattern for which she is taking Zoloft 150 mg total dosing in addition to Zyprexa for some other neurocognitive difficulties, and trazodone for sleep.  She could be at candidate to consider Remeron in the future.  Overall her mood seems to be stable, continue unchanged.  Continue lifestyle modifications to benefit mood.

## 2024-07-22 NOTE — ASSESSMENT & PLAN NOTE
Longstanding diagnosis as best I can determine from previous records, patient is on Xarelto for anticoagulation, although if GFR were decreased in the future consider switching to Eliquis.  Ongoing use of amiodarone 200 mg daily, ultimately taken over by [facility] [place] [facility].  Previous use of Cardizem for rate control but switched to metoprolol XL 25 mg daily as of late 2023, although it appears she has been coadministered in both.  With pulse more in the 50 range at her initial evaluation 3/19/2024, I did discontinue her Cardizem but continue the metoprolol and her pulse seems to be in a better range now in the 70s to 80s.  Keep follow-up with [facility] [place] [facility], last appointment 7/15/2024, with consideration to wean off amiodarone if atrial fibrillation burden is deemed to be low.

## 2024-07-22 NOTE — ASSESSMENT & PLAN NOTE
Patient with longstanding pattern of hip pain over the last couple years, with recent evaluations by orthopedist in the [place] area with reported injection therapy with equivocal benefit.  With this pain pattern she had been initiated transitionally on Norco 7.5/325 at half a tablet as needed.  I discussed previous and discussed again today that this is not a medicine I think is beneficial long-term, with more of an acute process, as such if she feels this might be needed longer-term she can speak with the orthopedist or potentially consider pain referral in the future. Referral locally at patient request to Dr. Ernst to reassess.  Previously there was a potential MRI imaging per previous orthopedist Dr. Del Toro, now transition under the care of Dr. Ernst.  I provided Norco 5/325 3 times daily as needed, #9 for some recent flare, to get to the point Dr. Ernst the next days, but further investigations and treatment as per his discretion.

## 2024-07-23 LAB
25(OH)D3+25(OH)D2 SERPL-MCNC: 28.8 NG/ML (ref 30–100)
ALBUMIN SERPL-MCNC: 3.8 G/DL (ref 3.8–4.8)
ALP SERPL-CCNC: 110 IU/L (ref 44–121)
ALT SERPL-CCNC: 60 IU/L (ref 0–32)
AST SERPL-CCNC: 42 IU/L (ref 0–40)
BASOPHILS # BLD AUTO: 0 X10E3/UL (ref 0–0.2)
BASOPHILS NFR BLD AUTO: 0 %
BILIRUB SERPL-MCNC: 0.9 MG/DL (ref 0–1.2)
BUN SERPL-MCNC: 22 MG/DL (ref 8–27)
BUN/CREAT SERPL: 14 (ref 12–28)
CALCIUM SERPL-MCNC: 9.9 MG/DL (ref 8.7–10.3)
CHLORIDE SERPL-SCNC: 104 MMOL/L (ref 96–106)
CHOLEST SERPL-MCNC: 211 MG/DL (ref 100–199)
CO2 SERPL-SCNC: 23 MMOL/L (ref 20–29)
CREAT SERPL-MCNC: 1.53 MG/DL (ref 0.57–1)
EGFRCR SERPLBLD CKD-EPI 2021: 34 ML/MIN/1.73
EOSINOPHIL # BLD AUTO: 0 X10E3/UL (ref 0–0.4)
EOSINOPHIL NFR BLD AUTO: 0 %
ERYTHROCYTE [DISTWIDTH] IN BLOOD BY AUTOMATED COUNT: 13.1 % (ref 11.7–15.4)
GLOBULIN SER CALC-MCNC: 2.3 G/DL (ref 1.5–4.5)
GLUCOSE SERPL-MCNC: 153 MG/DL (ref 70–99)
HCT VFR BLD AUTO: 48.1 % (ref 34–46.6)
HDLC SERPL-MCNC: 63 MG/DL
HGB BLD-MCNC: 15.2 G/DL (ref 11.1–15.9)
IMM GRANULOCYTES # BLD AUTO: 0 X10E3/UL (ref 0–0.1)
IMM GRANULOCYTES NFR BLD AUTO: 0 %
LDLC SERPL CALC-MCNC: 125 MG/DL (ref 0–99)
LYMPHOCYTES # BLD AUTO: 2.2 X10E3/UL (ref 0.7–3.1)
LYMPHOCYTES NFR BLD AUTO: 21 %
MCH RBC QN AUTO: 28.4 PG (ref 26.6–33)
MCHC RBC AUTO-ENTMCNC: 31.6 G/DL (ref 31.5–35.7)
MCV RBC AUTO: 90 FL (ref 79–97)
MONOCYTES # BLD AUTO: 0.7 X10E3/UL (ref 0.1–0.9)
MONOCYTES NFR BLD AUTO: 7 %
NEUTROPHILS # BLD AUTO: 7.4 X10E3/UL (ref 1.4–7)
NEUTROPHILS NFR BLD AUTO: 72 %
PLATELET # BLD AUTO: 221 X10E3/UL (ref 150–450)
POTASSIUM SERPL-SCNC: 4.2 MMOL/L (ref 3.5–5.2)
PROT SERPL-MCNC: 6.1 G/DL (ref 6–8.5)
RBC # BLD AUTO: 5.35 X10E6/UL (ref 3.77–5.28)
SODIUM SERPL-SCNC: 141 MMOL/L (ref 134–144)
T4 FREE SERPL-MCNC: 2.03 NG/DL (ref 0.82–1.77)
TRIGL SERPL-MCNC: 129 MG/DL (ref 0–149)
TSH SERPL DL<=0.005 MIU/L-ACNC: 2.72 UIU/ML (ref 0.45–4.5)
VLDLC SERPL CALC-MCNC: 23 MG/DL (ref 5–40)
WBC # BLD AUTO: 10.4 X10E3/UL (ref 3.4–10.8)

## 2024-07-24 ENCOUNTER — TELEPHONE (OUTPATIENT)
Dept: FAMILY MEDICINE CLINIC | Facility: CLINIC | Age: 79
End: 2024-07-24
Payer: MEDICARE

## 2024-07-24 LAB
BACTERIA UR CULT: NORMAL
BACTERIA UR CULT: NORMAL
MICROALBUMIN UR-MCNC: 6.5 UG/ML

## 2024-07-24 NOTE — TELEPHONE ENCOUNTER
Called patient and spoke to daughter she had a few questions will get copies of results from office and would like to talk to her brother regarding mychart set up and she has seen improvement from her mom regarding her Uti.

## 2024-07-24 NOTE — TELEPHONE ENCOUNTER
----- Message from Jm Madison sent at 7/24/2024  8:28 AM EDT -----  Please speak with the patient and family regarding laboratory investigations as obtained 7/22/2024.  Notable results as follows:    Vitamin D 25 hydroxy level slightly low at 28.8 with lower limit of normal 30, previous 45.7 previous year.  Please ensure she is still taking her vitamin D 2000 units daily, and continue unchanged.  TSH continues in normal range at 2.70, comparison 3.360 few months prior on 4/19/2024.  Free T4 which is the functional thyroid hormone level continues slightly elevated at 2.03 with normal range 0.82-1.77 but this is similar to where it was 3 months ago 2.05, and the TSH is a better reflector of thyroid function.  As such she appears to still be appropriate for continuing to hold previous levothyroxine 25 mcg daily, which she should no longer be taking.  Total cholesterol 211, triglycerides 129, HDL 63, .  Comparison to prior 10/6/2023 with total cholesterol 164, triglycerides 76, HDL 61, LDL 89.  Modestly worsened but still in good range, please ensure she is still taking her rosuvastatin 20 mg daily.  CMP with glucose 153 and a known type II diabetic.  Kidney function slightly decreased with creatinine 1.53 and GFR 34 compared to creatinine 1.344 months ago but prior to that 1.635 months ago and 1.409 months ago.  Some variability but overall still in reasonable range with caution renal toxic medications, no other management necessary.  Normal electrolytes, proteins.  Notable for liver function test for slight increase of AST at 42 with upper limit of normal 40, ALT at 60 with abnormal or 32, normal 10/6/2023.  CBC with differential with nonconcerning blood counts.  Urine culture shows mixed urogenital yohana growth at 25-50,000, which is still suspicious for an underlying UTI but not a specific organism and to target antibiotic resistance 2.  Nonetheless, continue antibiotic and change.  Urine microalbumin normal  at 6.5, which represents screening for protein loss and diabetes.    Related to mild increased liver function test, plan to recheck CMP when she follows up in 3 months.  Caution frequent use of Tylenol.  As noted above, with her urine, would still be suspicious that she had underlying UTI but we had not a specific organism growth to help clarify treatment.  As such please do clarify that she is doing well with nitrofurantoin, if questions or concerns please advise.  Otherwise no management changes necessary.

## 2024-08-19 DIAGNOSIS — N18.32 STAGE 3B CHRONIC KIDNEY DISEASE: ICD-10-CM

## 2024-08-19 DIAGNOSIS — E11.65 TYPE 2 DIABETES MELLITUS WITH HYPERGLYCEMIA, WITHOUT LONG-TERM CURRENT USE OF INSULIN: ICD-10-CM

## 2024-08-19 RX ORDER — EMPAGLIFLOZIN 10 MG/1
10 TABLET, FILM COATED ORAL DAILY
Qty: 30 TABLET | Refills: 3 | Status: SHIPPED | OUTPATIENT
Start: 2024-08-19

## 2024-08-28 ENCOUNTER — TELEPHONE (OUTPATIENT)
Dept: CARDIOLOGY | Facility: CLINIC | Age: 79
End: 2024-08-28
Payer: MEDICARE

## 2024-08-28 ENCOUNTER — TELEPHONE (OUTPATIENT)
Dept: FAMILY MEDICINE CLINIC | Facility: CLINIC | Age: 79
End: 2024-08-28
Payer: MEDICARE

## 2024-08-28 NOTE — TELEPHONE ENCOUNTER
I have left a voicemail for patient to return call to get clarification as to what is going on as she has not had ztb since last month for a uti.

## 2024-08-28 NOTE — TELEPHONE ENCOUNTER
Caller: Hilary Xie    Relationship: Self    Best call back number: [phone]    Which medication are you concerned about:   PT STATED THAT SHE HAS COMPLETED RX FOR BLADDER INFECTION SHE STILL HAS CONSTANT URINATION AND BURNING SENSATION AND REQUEST REFILL OF RX, INSTEAD OF COMING IN OFFICE

## 2024-08-28 NOTE — TELEPHONE ENCOUNTER
Tried calling patient, but I think she may be trying to get ahold of Dr. Vinicius Ernst as I dont see a order for this test.     HUB OK TO RELAY MESSAGE.

## 2024-08-28 NOTE — TELEPHONE ENCOUNTER
Caller: Hilary Xie    Relationship: Self    Best call back number: [phone]    Caller requesting test results:     What test was performed: BIOPSY ON SHOULDER    When was the test performed: 2.5 WEEKS    Where was the test performed:     Additional notes: PLEASE CALL THE PATIENT WITH RESULTS

## 2024-08-29 ENCOUNTER — TELEPHONE (OUTPATIENT)
Dept: FAMILY MEDICINE CLINIC | Facility: CLINIC | Age: 79
End: 2024-08-29
Payer: MEDICARE

## 2024-08-29 NOTE — TELEPHONE ENCOUNTER
Caller: [facility]    Relationship: Other    Best call back number: [phone]     What form or medical record are you requesting: LAB REQUEST FORM WITH DOCTOR SIGNATURE    Who is requesting this form or medical record from you: [facility]    How would you like to receive the form or medical records (pick-up, mail, fax): FAX    If fax, what is the fax number: [phone]    Timeframe paperwork needed: ASAP    Additional notes: THEY ARE CALLING TO ADVISE THEY ARE FAXING THESE FORMS OVER AND NEED DATE AND SIGNATURE FROM PROVIDER.

## 2024-08-29 NOTE — TELEPHONE ENCOUNTER
Spoke with pt. She finished abx 2 weeks ago and symptoms have returned. Informed her that we don't usually send in another round of meds but I will discuss with Dr. Madison and see what he wants her to do.

## 2024-08-29 NOTE — TELEPHONE ENCOUNTER
She did not have any a fib, however she had a lot of episodes that could turn into a fib in the future. I would like to continue to follow her every 6months to a year. If she develops new symptoms she should come in sooner. Including shortness of breath, chest pain or pressure, or leg swelling.

## 2024-09-03 RX ORDER — LEVOTHYROXINE SODIUM 25 UG/1
25 TABLET ORAL DAILY
Qty: 90 TABLET | OUTPATIENT
Start: 2024-09-03

## 2024-09-03 NOTE — TELEPHONE ENCOUNTER
I have called patient regarding the [facility] request. Dr. Oneal will refer her to Neurologist if she would like to do that however he is not comfortable signing the form as to he is not sure what is the goal.

## 2024-09-16 DIAGNOSIS — I48.11 LONGSTANDING PERSISTENT ATRIAL FIBRILLATION: ICD-10-CM

## 2024-09-16 RX ORDER — AMIODARONE HYDROCHLORIDE 200 MG/1
200 TABLET ORAL DAILY
Qty: 90 TABLET | Refills: 0 | Status: SHIPPED | OUTPATIENT
Start: 2024-09-16

## 2024-09-26 DIAGNOSIS — F51.04 PSYCHOPHYSIOLOGICAL INSOMNIA: ICD-10-CM

## 2024-09-26 RX ORDER — TRAZODONE HYDROCHLORIDE 100 MG/1
100 TABLET ORAL NIGHTLY
Qty: 30 TABLET | Refills: 1 | Status: SHIPPED | OUTPATIENT
Start: 2024-09-26

## 2024-10-14 ENCOUNTER — HOSPITAL ENCOUNTER (EMERGENCY)
Facility: HOSPITAL | Age: 79
Discharge: HOME | End: 2024-10-14
Attending: EMERGENCY MEDICINE | Admitting: EMERGENCY MEDICINE
Payer: MEDICARE

## 2024-10-14 ENCOUNTER — APPOINTMENT (OUTPATIENT)
Dept: GENERAL RADIOLOGY | Facility: HOSPITAL | Age: 79
End: 2024-10-14
Payer: MEDICARE

## 2024-10-14 VITALS
HEART RATE: 58 BPM | OXYGEN SATURATION: 93 % | RESPIRATION RATE: 20 BRPM | SYSTOLIC BLOOD PRESSURE: 132 MMHG | TEMPERATURE: 98.2 F | DIASTOLIC BLOOD PRESSURE: 92 MMHG | BODY MASS INDEX: 28.68 KG/M2 | HEIGHT: 62 IN | WEIGHT: 155.87 LBS

## 2024-10-14 DIAGNOSIS — N39.0 URINARY TRACT INFECTION WITHOUT HEMATURIA, SITE UNSPECIFIED: Primary | ICD-10-CM

## 2024-10-14 DIAGNOSIS — R11.0 NAUSEA: ICD-10-CM

## 2024-10-14 LAB
ALBUMIN SERPL-MCNC: 3.3 G/DL (ref 3.5–5.2)
ALBUMIN/GLOB SERPL: 1.2 G/DL
ALP SERPL-CCNC: 88 U/L (ref 39–117)
ALT SERPL W P-5'-P-CCNC: 47 U/L (ref 1–33)
ANION GAP SERPL CALCULATED.3IONS-SCNC: 12.5 MMOL/L (ref 5–15)
AST SERPL-CCNC: 37 U/L (ref 1–32)
BACTERIA UR QL AUTO: ABNORMAL /HPF
BASOPHILS # BLD AUTO: 0.07 10*3/MM3 (ref 0–0.2)
BASOPHILS NFR BLD AUTO: 1 % (ref 0–1.5)
BILIRUB SERPL-MCNC: 0.7 MG/DL (ref 0–1.2)
BILIRUB UR QL STRIP: NEGATIVE
BUN SERPL-MCNC: 23 MG/DL (ref 8–23)
BUN/CREAT SERPL: 18.4 (ref 7–25)
CALCIUM SPEC-SCNC: 8.8 MG/DL (ref 8.6–10.5)
CHLORIDE SERPL-SCNC: 108 MMOL/L (ref 98–107)
CLARITY UR: CLEAR
CO2 SERPL-SCNC: 20.5 MMOL/L (ref 22–29)
COLOR UR: YELLOW
CREAT SERPL-MCNC: 1.25 MG/DL (ref 0.57–1)
DEPRECATED RDW RBC AUTO: 46 FL (ref 37–54)
EGFRCR SERPLBLD CKD-EPI 2021: 43.7 ML/MIN/1.73
EOSINOPHIL # BLD AUTO: 0.18 10*3/MM3 (ref 0–0.4)
EOSINOPHIL NFR BLD AUTO: 2.5 % (ref 0.3–6.2)
ERYTHROCYTE [DISTWIDTH] IN BLOOD BY AUTOMATED COUNT: 14.5 % (ref 12.3–15.4)
GLOBULIN UR ELPH-MCNC: 2.8 GM/DL
GLUCOSE SERPL-MCNC: 165 MG/DL (ref 65–99)
GLUCOSE UR STRIP-MCNC: ABNORMAL MG/DL
HCT VFR BLD AUTO: 45.3 % (ref 34–46.6)
HGB BLD-MCNC: 14.2 G/DL (ref 12–15.9)
HGB UR QL STRIP.AUTO: NEGATIVE
HOLD SPECIMEN: NORMAL
HOLD SPECIMEN: NORMAL
HYALINE CASTS UR QL AUTO: ABNORMAL /LPF
IMM GRANULOCYTES # BLD AUTO: 0.04 10*3/MM3 (ref 0–0.05)
IMM GRANULOCYTES NFR BLD AUTO: 0.6 % (ref 0–0.5)
KETONES UR QL STRIP: ABNORMAL
LEUKOCYTE ESTERASE UR QL STRIP.AUTO: NEGATIVE
LYMPHOCYTES # BLD AUTO: 1.48 10*3/MM3 (ref 0.7–3.1)
LYMPHOCYTES NFR BLD AUTO: 20.6 % (ref 19.6–45.3)
MAGNESIUM SERPL-MCNC: 2.2 MG/DL (ref 1.6–2.4)
MCH RBC QN AUTO: 27.2 PG (ref 26.6–33)
MCHC RBC AUTO-ENTMCNC: 31.3 G/DL (ref 31.5–35.7)
MCV RBC AUTO: 86.8 FL (ref 79–97)
MONOCYTES # BLD AUTO: 0.48 10*3/MM3 (ref 0.1–0.9)
MONOCYTES NFR BLD AUTO: 6.7 % (ref 5–12)
NEUTROPHILS NFR BLD AUTO: 4.94 10*3/MM3 (ref 1.7–7)
NEUTROPHILS NFR BLD AUTO: 68.6 % (ref 42.7–76)
NITRITE UR QL STRIP: POSITIVE
NRBC BLD AUTO-RTO: 0 /100 WBC (ref 0–0.2)
PH UR STRIP.AUTO: 5.5 [PH] (ref 5–8)
PLATELET # BLD AUTO: 167 10*3/MM3 (ref 140–450)
PMV BLD AUTO: 12.2 FL (ref 6–12)
POTASSIUM SERPL-SCNC: 3.9 MMOL/L (ref 3.5–5.2)
PROT SERPL-MCNC: 6.1 G/DL (ref 6–8.5)
PROT UR QL STRIP: NEGATIVE
QT INTERVAL: 479 MS
QTC INTERVAL: 473 MS
RBC # BLD AUTO: 5.22 10*6/MM3 (ref 3.77–5.28)
RBC # UR STRIP: ABNORMAL /HPF
REF LAB TEST METHOD: ABNORMAL
SODIUM SERPL-SCNC: 141 MMOL/L (ref 136–145)
SP GR UR STRIP: >1.03 (ref 1–1.03)
SQUAMOUS #/AREA URNS HPF: ABNORMAL /HPF
TROPONIN T SERPL HS-MCNC: 11 NG/L
UROBILINOGEN UR QL STRIP: ABNORMAL
WBC # UR STRIP: ABNORMAL /HPF
WBC NRBC COR # BLD AUTO: 7.19 10*3/MM3 (ref 3.4–10.8)
WHOLE BLOOD HOLD COAG: NORMAL
WHOLE BLOOD HOLD SPECIMEN: NORMAL
YEAST URNS QL MICRO: ABNORMAL /HPF

## 2024-10-14 PROCEDURE — 36415 COLL VENOUS BLD VENIPUNCTURE: CPT

## 2024-10-14 PROCEDURE — 96375 TX/PRO/DX INJ NEW DRUG ADDON: CPT

## 2024-10-14 PROCEDURE — 25810000003 SODIUM CHLORIDE 0.9 % SOLUTION: Performed by: EMERGENCY MEDICINE

## 2024-10-14 PROCEDURE — 84484 ASSAY OF TROPONIN QUANT: CPT

## 2024-10-14 PROCEDURE — 25010000002 ONDANSETRON PER 1 MG: Performed by: EMERGENCY MEDICINE

## 2024-10-14 PROCEDURE — 81001 URINALYSIS AUTO W/SCOPE: CPT | Performed by: EMERGENCY MEDICINE

## 2024-10-14 PROCEDURE — 93005 ELECTROCARDIOGRAM TRACING: CPT

## 2024-10-14 PROCEDURE — 96374 THER/PROPH/DIAG INJ IV PUSH: CPT

## 2024-10-14 PROCEDURE — 93005 ELECTROCARDIOGRAM TRACING: CPT | Performed by: EMERGENCY MEDICINE

## 2024-10-14 PROCEDURE — 71045 X-RAY EXAM CHEST 1 VIEW: CPT

## 2024-10-14 PROCEDURE — 99284 EMERGENCY DEPT VISIT MOD MDM: CPT

## 2024-10-14 PROCEDURE — 25010000002 CEFTRIAXONE PER 250 MG: Performed by: EMERGENCY MEDICINE

## 2024-10-14 PROCEDURE — 83735 ASSAY OF MAGNESIUM: CPT

## 2024-10-14 PROCEDURE — 85025 COMPLETE CBC W/AUTO DIFF WBC: CPT

## 2024-10-14 PROCEDURE — 80053 COMPREHEN METABOLIC PANEL: CPT

## 2024-10-14 RX ORDER — ONDANSETRON 4 MG/1
4 TABLET, ORALLY DISINTEGRATING ORAL EVERY 8 HOURS PRN
Qty: 14 TABLET | Refills: 0 | Status: SHIPPED | OUTPATIENT
Start: 2024-10-14

## 2024-10-14 RX ORDER — SODIUM CHLORIDE 0.9 % (FLUSH) 0.9 %
10 SYRINGE (ML) INJECTION AS NEEDED
Status: DISCONTINUED | OUTPATIENT
Start: 2024-10-14 | End: 2024-10-14 | Stop reason: HOSPADM

## 2024-10-14 RX ORDER — CEPHALEXIN 500 MG/1
500 CAPSULE ORAL 3 TIMES DAILY
Qty: 21 CAPSULE | Refills: 0 | Status: SHIPPED | OUTPATIENT
Start: 2024-10-14

## 2024-10-14 RX ORDER — ONDANSETRON 2 MG/ML
4 INJECTION INTRAMUSCULAR; INTRAVENOUS ONCE
Status: COMPLETED | OUTPATIENT
Start: 2024-10-14 | End: 2024-10-14

## 2024-10-14 RX ADMIN — ONDANSETRON 4 MG: 2 INJECTION, SOLUTION INTRAMUSCULAR; INTRAVENOUS at 07:49

## 2024-10-14 RX ADMIN — SODIUM CHLORIDE 500 ML: 9 INJECTION, SOLUTION INTRAVENOUS at 07:53

## 2024-10-14 RX ADMIN — SODIUM CHLORIDE 1000 MG: 9 INJECTION, SOLUTION INTRAMUSCULAR; INTRAVENOUS; SUBCUTANEOUS at 08:57

## 2024-10-14 NOTE — ED PROVIDER NOTES
Time: 7:41 AM EDT  Date of encounter:  10/14/2024  Independent Historian/Clinical History and Information was obtained by:   Patient and Family    History is limited by: N/A    Chief Complaint: Nausea      History of Present Illness:  Patient is a 79 y.o. year old female who presents to the emergency department for evaluation of nausea and lightheadedness.  Patient reports when she got a bed this morning she noticed she was nauseated with lightheadedness.  Denies chest pain, shortness of breath, abdominal pain.      Patient Care Team  Primary Care Provider: Jm Madison MD    Past Medical History:     Allergies   Allergen Reactions    Donepezil Nausea Only    Metformin Nausea And Vomiting    Sulfa Antibiotics Hives     Past Medical History:   Diagnosis Date    Atrial fibrillation     Hyperlipidemia     Hypertension      Past Surgical History:   Procedure Laterality Date    SPINE SURGERY       Family History   Problem Relation Age of Onset    Cancer Mother     Diabetes Mother     Cancer Father     Heart failure Brother        Home Medications:  Prior to Admission medications    Medication Sig Start Date End Date Taking? Authorizing Provider   amiodarone (PACERONE) 200 MG tablet TAKE 1 TABLET BY MOUTH DAILY 9/16/24  Yes Jm Madison MD   Cholecalciferol (Vitamin D3) 50 MCG (2000 UT) capsule TAKE 1 CAPSULE BY MOUTH DAILY 2/23/23  Yes Clarence Martínez MD   ferrous sulfate (FeroSul) 325 (65 FE) MG tablet Take 1 tablet by mouth Daily. 5/7/24  Yes Jm Madison MD   Jardiance 10 MG tablet tablet TAKE 1 TABLET BY MOUTH DAILY 8/19/24  Yes Jm Madison MD   OLANZapine (ZyPREXA) 10 MG tablet Take 1 tablet by mouth Every Night. 7/22/24  Yes Jm Madison MD   potassium chloride (KLOR-CON M10) 10 MEQ CR tablet TAKE 1 TABLET BY MOUTH TWICE DAILY 6/26/24  Yes Jm Madison MD   risedronate (ACTONEL) 150 MG tablet Take 1 tablet by mouth Every 30 (Thirty) Days. 3/4/24  Yes Clarence Martínez MD   rivaroxaban (XARELTO) 20 MG tablet Take 1 tablet by  mouth Daily With Dinner. 3/4/24  Yes Clarence Martínez MD   rosuvastatin (CRESTOR) 20 MG tablet Take 1 tablet by mouth Daily. 3/4/24  Yes Clarence Martínez MD   sertraline (ZOLOFT) 100 MG tablet Take 1.5 tablets by mouth Daily. 3/4/24  Yes Clarence Martínez MD   traZODone (DESYREL) 100 MG tablet TAKE 1 TABLET BY MOUTH EVERY NIGHT 9/26/24  Yes Jm Madison MD   amantadine (SYMMETREL) 100 MG tablet  3/18/24   ProviderMarta MD   HYDROcodone-acetaminophen (Norco) 5-325 MG per tablet Take 1 tablet by mouth Every 8 (Eight) Hours As Needed for Moderate Pain or Mild Pain. 7/22/24   Jm Madison MD   Ibuprofen 3 %, Gabapentin 10 %, Baclofen 2 %, lidocaine 4 % Apply 1-2 g topically to the appropriate area as directed 3 (Three) to 4 (Four) times daily. 3/12/24 3/12/25  Derek Del Toro MD   Memantine HCl-Donepezil HCl (Namzaric) 14-10 MG capsule sustained-release 24 hr Take 1 each by mouth Every Night. 3/4/24   Clarence Martínez MD   metoprolol tartrate (LOPRESSOR) 25 MG tablet 1 to 2 tablets twice daily as needed for rapid heart rate 10/11/23   Liyah Sanderson APRN   nitrofurantoin, macrocrystal-monohydrate, (Macrobid) 100 MG capsule Take 1 capsule by mouth 2 (Two) Times a Day. 7/22/24   Jm Madison MD   vitamin C (ASCORBIC ACID) 250 MG tablet Take 2 tablets by mouth Daily.    ProviderMarta MD        Social History:   Social History     Tobacco Use    Smoking status: Never     Passive exposure: Never    Smokeless tobacco: Never   Vaping Use    Vaping status: Never Used   Substance Use Topics    Alcohol use: Never    Drug use: Never         Review of Systems:  Review of Systems   Constitutional:  Negative for chills and fever.   HENT:  Negative for congestion, rhinorrhea and sore throat.    Eyes:  Negative for pain and visual disturbance.   Respiratory:  Negative for apnea, cough, chest tightness and shortness of breath.    Cardiovascular:  Negative for chest pain and palpitations.   Gastrointestinal:  Positive  "for nausea. Negative for abdominal pain, diarrhea and vomiting.   Genitourinary:  Negative for difficulty urinating and dysuria.   Musculoskeletal:  Negative for joint swelling and myalgias.   Skin:  Negative for color change.   Neurological:  Negative for seizures and headaches.   Psychiatric/Behavioral: Negative.     All other systems reviewed and are negative.       Physical Exam:  /57   Pulse 60   Temp 97.7 °F (36.5 °C) (Oral)   Resp 16   Ht 157.5 cm (62\")   Wt 70.7 kg (155 lb 13.8 oz)   SpO2 92%   BMI 28.51 kg/m²     Physical Exam  Vitals and nursing note reviewed.   Constitutional:       General: She is not in acute distress.     Appearance: Normal appearance. She is not toxic-appearing.   HENT:      Head: Normocephalic and atraumatic.      Jaw: There is normal jaw occlusion.      Mouth/Throat:      Mouth: Mucous membranes are dry.   Eyes:      General: Lids are normal.      Extraocular Movements: Extraocular movements intact.      Conjunctiva/sclera: Conjunctivae normal.      Pupils: Pupils are equal, round, and reactive to light.   Cardiovascular:      Rate and Rhythm: Normal rate and regular rhythm.      Pulses: Normal pulses.      Heart sounds: Normal heart sounds.   Pulmonary:      Effort: Pulmonary effort is normal. No respiratory distress.      Breath sounds: Normal breath sounds. No wheezing or rhonchi.   Abdominal:      General: Abdomen is flat.      Palpations: Abdomen is soft.      Tenderness: There is no abdominal tenderness. There is no guarding or rebound.   Musculoskeletal:         General: Normal range of motion.      Cervical back: Normal range of motion and neck supple.      Right lower leg: No edema.      Left lower leg: No edema.   Skin:     General: Skin is warm and dry.   Neurological:      Mental Status: She is alert and oriented to person, place, and time. Mental status is at baseline.   Psychiatric:         Mood and Affect: Mood normal.            "       Procedures:  Procedures      Medical Decision Making:      Comorbidities that affect care:    A-fib, hypertension, diabetes, CKD    External Notes reviewed:    None      The following orders were placed and all results were independently analyzed by me:  Orders Placed This Encounter   Procedures    XR Chest 1 View    [place] Draw    Comprehensive Metabolic Panel    Single High Sensitivity Troponin T    Magnesium    Urinalysis With Microscopic If Indicated (No Culture) - Urine, Clean Catch    CBC Auto Differential    Urinalysis, Microscopic Only - Urine, Clean Catch    NPO Diet NPO Type: Strict NPO    Undress & Gown    Continuous Pulse Oximetry    Vital Signs    Orthostatic Blood Pressure    Oxygen Therapy- Nasal Cannula; Titrate 1-6 LPM Per SpO2; 90 - 95%    POC Glucose Once    ECG 12 Lead ED Triage Standing Order; Weak / Dizzy / AMS    Insert Peripheral IV    Fall Precautions    CBC & Differential    Green Top (Gel)    Lavender Top    Gold Top - SST    Light Blue Top       Medications Given in the Emergency Department:  Medications   sodium chloride 0.9 % flush 10 mL (has no administration in time range)   cefTRIAXone (ROCEPHIN) in NS 1 gram/10ml IV PUSH syringe (1,000 mg Intravenous Given 10/14/24 0857)   ondansetron (ZOFRAN) injection 4 mg (4 mg Intravenous Given 10/14/24 0734)   sodium chloride 0.9 % bolus 500 mL (500 mL Intravenous New Bag 10/14/24 9183)        ED Course:         Labs:    Lab Results (last 24 hours)       Procedure Component Value Units Date/Time    CBC & Differential [[id number]]  (Abnormal) Collected: 10/14/24 0447    Specimen: Blood Updated: 10/14/24 0453    Narrative:      The following orders were created for panel order CBC & Differential.  Procedure                               Abnormality         Status                     ---------                               -----------         ------                     CBC Auto Differential[[id number]]        Abnormal            Final result                  Please view results for these tests on the individual orders.    Comprehensive Metabolic Panel [[id number]]  (Abnormal) Collected: 10/14/24 0447    Specimen: Blood Updated: 10/14/24 0522     Glucose 165 mg/dL      BUN 23 mg/dL      Creatinine 1.25 mg/dL      Sodium 141 mmol/L      Potassium 3.9 mmol/L      Chloride 108 mmol/L      CO2 20.5 mmol/L      Calcium 8.8 mg/dL      Total Protein 6.1 g/dL      Albumin 3.3 g/dL      ALT (SGPT) 47 U/L      AST (SGOT) 37 U/L      Alkaline Phosphatase 88 U/L      Total Bilirubin 0.7 mg/dL      Globulin 2.8 gm/dL      A/G Ratio 1.2 g/dL      BUN/Creatinine Ratio 18.4     Anion Gap 12.5 mmol/L      eGFR 43.7 mL/min/1.73     Narrative:      GFR Normal >60  Chronic Kidney Disease <60  Kidney Failure <15    The GFR formula is only valid for adults with stable renal function between ages 18 and 70.    Single High Sensitivity Troponin T [[id number]]  (Normal) Collected: 10/14/24 0447    Specimen: Blood Updated: 10/14/24 0516     HS Troponin T 11 ng/L     Narrative:      High Sensitive Troponin T Reference Range:  <14.0 ng/L- Negative Female for AMI  <22.0 ng/L- Negative Male for AMI  >=14 - Abnormal Female indicating possible myocardial injury.  >=22 - Abnormal Male indicating possible myocardial injury.   Clinicians would have to utilize clinical acumen, EKG, Troponin, and serial changes to determine if it is an Acute Myocardial Infarction or myocardial injury due to an underlying chronic condition.         Magnesium [[id number]]  (Normal) Collected: 10/14/24 0447    Specimen: Blood Updated: 10/14/24 0522     Magnesium 2.2 mg/dL     CBC Auto Differential [[id number]]  (Abnormal) Collected: 10/14/24 0447    Specimen: Blood Updated: 10/14/24 0453     WBC 7.19 10*3/mm3      RBC 5.22 10*6/mm3      Hemoglobin 14.2 g/dL      Hematocrit 45.3 %      MCV 86.8 fL      MCH 27.2 pg      MCHC 31.3 g/dL      RDW 14.5 %      RDW-SD 46.0 fl      MPV 12.2 fL      Platelets 167 10*3/mm3       Neutrophil % 68.6 %      Lymphocyte % 20.6 %      Monocyte % 6.7 %      Eosinophil % 2.5 %      Basophil % 1.0 %      Immature Grans % 0.6 %      Neutrophils, Absolute 4.94 10*3/mm3      Lymphocytes, Absolute 1.48 10*3/mm3      Monocytes, Absolute 0.48 10*3/mm3      Eosinophils, Absolute 0.18 10*3/mm3      Basophils, Absolute 0.07 10*3/mm3      Immature Grans, Absolute 0.04 10*3/mm3      nRBC 0.0 /100 WBC     Urinalysis With Microscopic If Indicated (No Culture) - Urine, Clean Catch [[id number]]  (Abnormal) Collected: 10/14/24 0808    Specimen: Urine, Clean Catch Updated: 10/14/24 0817     Color, UA Yellow     Appearance, UA Clear     pH, UA 5.5     Specific Gravity, UA >1.030     Glucose, UA >=1000 mg/dL (3+)     Ketones, UA Trace     Bilirubin, UA Negative     Blood, UA Negative     Protein, UA Negative     Leuk Esterase, UA Negative     Nitrite, UA Positive     Urobilinogen, UA 1.0 E.U./dL    Urinalysis, Microscopic Only - Urine, Clean Catch [[id number]]  (Abnormal) Collected: 10/14/24 0808    Specimen: Urine, Clean Catch Updated: 10/14/24 0826     RBC, UA None Seen /HPF      WBC, UA 0-2 /HPF      Bacteria, UA 2+ /HPF      Squamous Epithelial Cells, UA 0-2 /HPF      Yeast, UA Moderate/2+ Budding Yeast /HPF      Hyaline Casts, UA None Seen /LPF      Methodology Manual Light Microscopy             Imaging:    XR Chest 1 View    Result Date: 10/14/2024  AP PORTABLE CHEST  HISTORY: Weakness.  COMPARISON: 5/12/2023  TECHNIQUE: AP portable chest x-ray.  FINDINGS: Cardiac and mediastinal contours are normal. There is some atherosclerotic disease in the aorta. Lung volumes are lower, and there is mild infiltrate or atelectasis in the bases. Lungs are otherwise clear. No pneumothorax. Postoperative changes are noted in the right shoulder.      Lower lung volumes on the current study with mild atelectasis or infiltrate in both bases.  Electronically Signed By-Dr. Dar Sánchez MD On:10/14/2024 5:10 AM         Differential  Diagnosis and Discussion:    Metabolic: Differential diagnosis includes but is not limited to hypertension, hyperglycemia, hyperkalemia, hypocalcemia, metabolic acidosis, hypokalemia, hypoglycemia, malnutrition, hypothyroidism, hyperthyroidism, and adrenal insufficiency.     All labs were reviewed and interpreted by me.  All X-rays impressions were independently interpreted by me.    MDM  Number of Diagnoses or Management Options  Nausea  Urinary tract infection without hematuria, site unspecified  Diagnosis management comments: In summary this is an 79-year-old female who presents to the Emergency Department for evaluation.  CBC independently reviewed and interpreted by me and shows no critical abnormalities.  CMP independently reviewed and interpreted by me and shows no critical abnormalities.  Urinalysis independent reviewed interpreted by me is positive for UTI for which patient has received IV antibiotics in the emerged part and will be discharged home with oral antibiotics.  Presents complaining nausea will be discharged home with oral antiemetics.  Very strict return to ER and follow-up instructions have been provided to the patient.         Amount and/or Complexity of Data Reviewed  Clinical lab tests: reviewed  Tests in the radiology section of CPT®: reviewed  Tests in the medicine section of CPT®: reviewed                       Patient Care Considerations:    CT ABDOMEN AND PELVIS: I considered ordering a CT scan of the abdomen and pelvis however benign abdominal examination      Consultants/Shared Management Plan:    None    Social Determinants of Health:    Patient has presented with family members who are responsible, reliable and will ensure follow up care.      Disposition and Care Coordination:    Discharged: I considered escalation of care by admitting this patient to the hospital, however no sepsis identified    I have explained the patient´s condition, diagnoses and treatment plan based on the  information available to me at this time. I have answered questions and addressed any concerns. The patient has a good  understanding of the patient´s diagnosis, condition, and treatment plan as can be expected at this point. The vital signs have been stable. The patient´s condition is stable and appropriate for discharge from the emergency department.      The patient will pursue further outpatient evaluation with the primary care physician or other designated or consulting physician as outlined in the discharge instructions. They are agreeable to this plan of care and follow-up instructions have been explained in detail. The patient has received these instructions in written format and has expressed an understanding of the discharge instructions. The patient is aware that any significant change in condition or worsening of symptoms should prompt an immediate return to this or the closest emergency department or call to 911.  I have explained discharge medications and the need for follow up with the patient/caretakers. This was also printed in the discharge instructions. Patient was discharged with the following medications and follow up:      Medication List        New Prescriptions      cephalexin 500 MG capsule  Commonly known as: KEFLEX  Take 1 capsule by mouth 3 (Three) Times a Day.     ondansetron ODT 4 MG disintegrating tablet  Commonly known as: ZOFRAN-ODT  Place 1 tablet on the tongue Every 8 (Eight) Hours As Needed for Nausea or Vomiting.               Where to Get Your Medications        These medications were sent to [facility] - [place] - [address] - [phone]  - [phone]   [address], [place] [address]      Phone: [phone]   cephalexin 500 MG capsule  ondansetron ODT 4 MG disintegrating tablet      Jm Madison MD  [address] [place] [address] [address] [place] [address] [phone]    In 1 week         Final diagnoses:   Urinary tract infection without hematuria, site  unspecified   Nausea        ED Disposition       ED Disposition   Discharge    Condition   Stable    Comment   --               This medical record created using voice recognition software.             Brennan Jerez MD  10/14/24 0900

## 2024-10-17 DIAGNOSIS — F03.90 MAJOR NEUROCOGNITIVE DISORDER: Primary | ICD-10-CM

## 2024-10-17 RX ORDER — MEMANTINE HYDROCHLORIDE AND DONEPEZIL HYDROCHLORIDE 14; 10 MG/1; MG/1
1 CAPSULE ORAL NIGHTLY
Qty: 90 CAPSULE | Refills: 1 | Status: SHIPPED | OUTPATIENT
Start: 2024-10-17

## 2024-10-21 DIAGNOSIS — M15.0 PRIMARY OSTEOARTHRITIS INVOLVING MULTIPLE JOINTS: Primary | ICD-10-CM

## 2024-10-21 RX ORDER — RISEDRONATE SODIUM 150 MG/1
150 TABLET, FILM COATED ORAL
Qty: 3 TABLET | Refills: 1 | Status: SHIPPED | OUTPATIENT
Start: 2024-10-21

## 2024-10-30 LAB
QT INTERVAL: 479 MS
QTC INTERVAL: 473 MS

## 2024-11-06 ENCOUNTER — OFFICE VISIT (OUTPATIENT)
Dept: INTERNAL MEDICINE | Age: 79
End: 2024-11-06
Payer: MEDICARE

## 2024-11-06 ENCOUNTER — TELEPHONE (OUTPATIENT)
Dept: INTERNAL MEDICINE | Age: 79
End: 2024-11-06

## 2024-11-06 VITALS
HEIGHT: 62 IN | OXYGEN SATURATION: 98 % | HEART RATE: 70 BPM | RESPIRATION RATE: 18 BRPM | TEMPERATURE: 96.6 F | SYSTOLIC BLOOD PRESSURE: 128 MMHG | DIASTOLIC BLOOD PRESSURE: 86 MMHG | BODY MASS INDEX: 28.82 KG/M2 | WEIGHT: 156.6 LBS

## 2024-11-06 DIAGNOSIS — F33.41 RECURRENT MAJOR DEPRESSION IN PARTIAL REMISSION: ICD-10-CM

## 2024-11-06 DIAGNOSIS — E87.6 HYPOKALEMIA: ICD-10-CM

## 2024-11-06 DIAGNOSIS — N18.32 STAGE 3B CHRONIC KIDNEY DISEASE: ICD-10-CM

## 2024-11-06 DIAGNOSIS — E11.65 TYPE 2 DIABETES MELLITUS WITH HYPERGLYCEMIA, WITHOUT LONG-TERM CURRENT USE OF INSULIN: Primary | ICD-10-CM

## 2024-11-06 DIAGNOSIS — M25.552 LEFT HIP PAIN: ICD-10-CM

## 2024-11-06 DIAGNOSIS — E03.8 SUBCLINICAL HYPOTHYROIDISM: ICD-10-CM

## 2024-11-06 DIAGNOSIS — G47.33 OSA ON CPAP: ICD-10-CM

## 2024-11-06 DIAGNOSIS — M15.0 PRIMARY OSTEOARTHRITIS INVOLVING MULTIPLE JOINTS: ICD-10-CM

## 2024-11-06 DIAGNOSIS — F03.90 MAJOR NEUROCOGNITIVE DISORDER: ICD-10-CM

## 2024-11-06 DIAGNOSIS — E78.2 MIXED HYPERLIPIDEMIA: ICD-10-CM

## 2024-11-06 DIAGNOSIS — I48.11 LONGSTANDING PERSISTENT ATRIAL FIBRILLATION: ICD-10-CM

## 2024-11-06 DIAGNOSIS — F51.04 PSYCHOPHYSIOLOGICAL INSOMNIA: ICD-10-CM

## 2024-11-06 DIAGNOSIS — I10 HYPERTENSION, ESSENTIAL: ICD-10-CM

## 2024-11-06 DIAGNOSIS — M16.12 PRIMARY OSTEOARTHRITIS OF LEFT HIP: ICD-10-CM

## 2024-11-06 DIAGNOSIS — E11.65 TYPE 2 DIABETES MELLITUS WITH HYPERGLYCEMIA, WITHOUT LONG-TERM CURRENT USE OF INSULIN: ICD-10-CM

## 2024-11-06 LAB
ALBUMIN SERPL-MCNC: 3.7 G/DL (ref 3.5–5.2)
ALBUMIN/GLOB SERPL: 1.3 G/DL
ALP SERPL-CCNC: 114 U/L (ref 39–117)
ALT SERPL W P-5'-P-CCNC: 38 U/L (ref 1–33)
ANION GAP SERPL CALCULATED.3IONS-SCNC: 10 MMOL/L (ref 5–15)
AST SERPL-CCNC: 39 U/L (ref 1–32)
BILIRUB SERPL-MCNC: 0.7 MG/DL (ref 0–1.2)
BUN SERPL-MCNC: 14 MG/DL (ref 8–23)
BUN/CREAT SERPL: 10.9 (ref 7–25)
CALCIUM SPEC-SCNC: 9 MG/DL (ref 8.6–10.5)
CHLORIDE SERPL-SCNC: 109 MMOL/L (ref 98–107)
CHOLEST SERPL-MCNC: 184 MG/DL (ref 0–200)
CO2 SERPL-SCNC: 22 MMOL/L (ref 22–29)
CREAT SERPL-MCNC: 1.29 MG/DL (ref 0.57–1)
EGFRCR SERPLBLD CKD-EPI 2021: 42 ML/MIN/1.73
GLOBULIN UR ELPH-MCNC: 2.9 GM/DL
GLUCOSE SERPL-MCNC: 194 MG/DL (ref 65–99)
HBA1C MFR BLD: 6.5 % (ref 4.8–5.6)
HDLC SERPL-MCNC: 62 MG/DL (ref 40–60)
LDLC SERPL CALC-MCNC: 104 MG/DL (ref 0–100)
LDLC/HDLC SERPL: 1.65 {RATIO}
MAGNESIUM SERPL-MCNC: 2.1 MG/DL (ref 1.6–2.4)
POTASSIUM SERPL-SCNC: 4.3 MMOL/L (ref 3.5–5.2)
PROT SERPL-MCNC: 6.6 G/DL (ref 6–8.5)
SODIUM SERPL-SCNC: 141 MMOL/L (ref 136–145)
TRIGL SERPL-MCNC: 99 MG/DL (ref 0–150)
TSH SERPL DL<=0.05 MIU/L-ACNC: 4.22 UIU/ML (ref 0.27–4.2)
VLDLC SERPL-MCNC: 18 MG/DL (ref 5–40)

## 2024-11-06 PROCEDURE — 1160F RVW MEDS BY RX/DR IN RCRD: CPT | Performed by: INTERNAL MEDICINE

## 2024-11-06 PROCEDURE — 83735 ASSAY OF MAGNESIUM: CPT | Performed by: INTERNAL MEDICINE

## 2024-11-06 PROCEDURE — G2211 COMPLEX E/M VISIT ADD ON: HCPCS | Performed by: INTERNAL MEDICINE

## 2024-11-06 PROCEDURE — 80061 LIPID PANEL: CPT | Performed by: INTERNAL MEDICINE

## 2024-11-06 PROCEDURE — 36415 COLL VENOUS BLD VENIPUNCTURE: CPT | Performed by: INTERNAL MEDICINE

## 2024-11-06 PROCEDURE — 83036 HEMOGLOBIN GLYCOSYLATED A1C: CPT | Performed by: INTERNAL MEDICINE

## 2024-11-06 PROCEDURE — 3074F SYST BP LT 130 MM HG: CPT | Performed by: INTERNAL MEDICINE

## 2024-11-06 PROCEDURE — 99214 OFFICE O/P EST MOD 30 MIN: CPT | Performed by: INTERNAL MEDICINE

## 2024-11-06 PROCEDURE — 1159F MED LIST DOCD IN RCRD: CPT | Performed by: INTERNAL MEDICINE

## 2024-11-06 PROCEDURE — 80053 COMPREHEN METABOLIC PANEL: CPT | Performed by: INTERNAL MEDICINE

## 2024-11-06 PROCEDURE — 3079F DIAST BP 80-89 MM HG: CPT | Performed by: INTERNAL MEDICINE

## 2024-11-06 PROCEDURE — 84443 ASSAY THYROID STIM HORMONE: CPT | Performed by: INTERNAL MEDICINE

## 2024-11-06 RX ORDER — TRAZODONE HYDROCHLORIDE 150 MG/1
150 TABLET ORAL NIGHTLY
Qty: 90 TABLET | Refills: 1 | Status: SHIPPED | OUTPATIENT
Start: 2024-11-06

## 2024-11-06 RX ORDER — SERTRALINE HYDROCHLORIDE 100 MG/1
150 TABLET, FILM COATED ORAL DAILY
Qty: 135 TABLET | Refills: 1 | Status: CANCELLED | OUTPATIENT
Start: 2024-11-06

## 2024-11-06 RX ORDER — OLANZAPINE 10 MG/1
10 TABLET ORAL NIGHTLY
Qty: 30 TABLET | Refills: 2 | Status: CANCELLED | OUTPATIENT
Start: 2024-11-06

## 2024-11-06 RX ORDER — AMIODARONE HYDROCHLORIDE 200 MG/1
200 TABLET ORAL DAILY
Qty: 90 TABLET | Refills: 0 | Status: CANCELLED | OUTPATIENT
Start: 2024-11-06

## 2024-11-06 RX ORDER — POTASSIUM CHLORIDE 750 MG/1
10 TABLET, EXTENDED RELEASE ORAL 2 TIMES DAILY
Qty: 90 TABLET | Refills: 1 | Status: CANCELLED | OUTPATIENT
Start: 2024-11-06

## 2024-11-06 RX ORDER — TRAZODONE HYDROCHLORIDE 150 MG/1
150 TABLET ORAL NIGHTLY
Qty: 90 TABLET | Refills: 1 | Status: CANCELLED | OUTPATIENT
Start: 2024-11-06

## 2024-11-06 RX ORDER — ROSUVASTATIN CALCIUM 20 MG/1
20 TABLET, COATED ORAL DAILY
Qty: 90 TABLET | Refills: 1 | Status: CANCELLED | OUTPATIENT
Start: 2024-11-06

## 2024-11-06 RX ORDER — METOPROLOL TARTRATE 25 MG/1
TABLET, FILM COATED ORAL
Qty: 60 TABLET | Refills: 1 | Status: CANCELLED | OUTPATIENT
Start: 2024-11-06

## 2024-11-06 RX ORDER — MEMANTINE HYDROCHLORIDE AND DONEPEZIL HYDROCHLORIDE 14; 10 MG/1; MG/1
1 CAPSULE ORAL NIGHTLY
Qty: 90 CAPSULE | Refills: 1 | Status: CANCELLED | OUTPATIENT
Start: 2024-11-06

## 2024-11-06 RX ORDER — ACETAMINOPHEN 160 MG
2000 TABLET,DISINTEGRATING ORAL DAILY
Qty: 90 CAPSULE | Refills: 1 | Status: CANCELLED | OUTPATIENT
Start: 2024-11-06

## 2024-11-06 RX ORDER — MULTIVIT WITH MINERALS/LUTEIN
500 TABLET ORAL DAILY
Status: CANCELLED | OUTPATIENT
Start: 2024-11-06

## 2024-11-06 RX ORDER — AMANTADINE HYDROCHLORIDE 100 MG/1
TABLET ORAL
Status: CANCELLED | OUTPATIENT
Start: 2024-11-06

## 2024-11-06 RX ORDER — RISEDRONATE SODIUM 150 MG/1
150 TABLET, FILM COATED ORAL
Qty: 3 TABLET | Refills: 1 | Status: CANCELLED | OUTPATIENT
Start: 2024-11-06

## 2024-11-06 RX ORDER — FERROUS SULFATE 325(65) MG
1 TABLET ORAL DAILY
Qty: 90 TABLET | Refills: 1 | Status: CANCELLED | OUTPATIENT
Start: 2024-11-06

## 2024-11-06 NOTE — ASSESSMENT & PLAN NOTE
Patient reports compliance with CPAP as of 11/24 OV.  However she is having some issues with sleep, it has been several years since her sleep apnea has been evaluated, so we will reach out to sleep lab at this time.

## 2024-11-06 NOTE — ASSESSMENT & PLAN NOTE
Patient's A-fib remains controlled, her pulse is right around 70 as of her 11/24 OV.  She is maintained on low-dose amiodarone, TSH was normal in 7/24, but we will repeat that now along with renal function as of her 11/24 OV.  She is on full dose Xarelto for stroke prevention as well as low-dose metoprolol for rate control, stable to continue same.

## 2024-11-06 NOTE — TELEPHONE ENCOUNTER
Caller: Hilary Xie    Relationship: Self    Best call back number: [phone]    Requested Prescriptions:   Requested Prescriptions     Pending Prescriptions Disp Refills    amantadine (SYMMETREL) 100 MG tablet      amiodarone (PACERONE) 200 MG tablet 90 tablet 0     Sig: Take 1 tablet by mouth Daily.    cephalexin (KEFLEX) 500 MG capsule 21 capsule 0     Sig: Take 1 capsule by mouth 3 (Three) Times a Day.    Cholecalciferol (Vitamin D3) 50 MCG (2000 UT) capsule 90 capsule 1     Sig: Take 1 capsule by mouth Daily.    ferrous sulfate (FeroSul) 325 (65 FE) MG tablet 90 tablet 1     Sig: Take 1 tablet by mouth Daily.    HYDROcodone-acetaminophen (Norco) 5-325 MG per tablet 9 tablet 0     Sig: Take 1 tablet by mouth Every 8 (Eight) Hours As Needed for Moderate Pain or Mild Pain.    Ibuprofen 3 %, Gabapentin 10 %, Baclofen 2 %, lidocaine 4 % 90 g 5     Sig: Apply 1-2 g topically to the appropriate area as directed 3 (Three) to 4 (Four) times daily.    empagliflozin (Jardiance) 10 MG tablet tablet 30 tablet 3     Sig: Take 1 tablet by mouth Daily.    metoprolol tartrate (LOPRESSOR) 25 MG tablet 60 tablet 1     Si to 2 tablets twice daily as needed for rapid heart rate    Memantine HCl-Donepezil HCl (Namzaric) 14-10 MG capsule sustained-release 24 hr 90 capsule 1     Sig: Take 1 capsule by mouth Every Night.    OLANZapine (ZyPREXA) 10 MG tablet 30 tablet 2     Sig: Take 1 tablet by mouth Every Night.    ondansetron ODT (ZOFRAN-ODT) 4 MG disintegrating tablet 14 tablet 0     Sig: Place 1 tablet on the tongue Every 8 (Eight) Hours As Needed for Nausea or Vomiting.    potassium chloride (KLOR-CON M10) 10 MEQ CR tablet 90 tablet 1     Sig: Take 1 tablet by mouth 2 (Two) Times a Day.    risedronate (ACTONEL) 150 MG tablet 3 tablet 1     Sig: Take 1 tablet by mouth Every 30 (Thirty) Days.    rivaroxaban (XARELTO) 20 MG tablet 90 tablet 1     Sig: Take 1 tablet by mouth Daily With Dinner.    rosuvastatin (CRESTOR) 20  MG tablet 90 tablet 1     Sig: Take 1 tablet by mouth Daily.    sertraline (ZOLOFT) 100 MG tablet 135 tablet 1     Sig: Take 1.5 tablets by mouth Daily.    traZODone (DESYREL) 150 MG tablet 90 tablet 1     Sig: Take 1 tablet by mouth Every Night.    vitamin C (ASCORBIC ACID) 250 MG tablet       Sig: Take 2 tablets by mouth Daily.        Pharmacy where request should be sent[facility] [place] - [address] - [phone] [facility] [phone]      Last office visit with prescribing clinician: 11/6/2024   Last telemedicine visit with prescribing clinician: Visit date not found   Next office visit with prescribing clinician: 2/10/2025     Additional details provided by patient: PATIENT HAS RIGHT AT A THREE DAY SUPPLY OF THESE MEDICATIONS LEFT. SHE HAD AN APPT TODAY. PLEASE SEND NEW PRESCRIPTIONS TO PHARMACY ASAP.    Does the patient have less than a 3 day supply:  [] Yes  [x] No    Would you like a call back once the refill request has been completed: [] Yes [] No    If the office needs to give you a call back, can they leave a voicemail: [] Yes [] No    Nichelle Tompkins Rep   11/06/24 12:35 EST

## 2024-11-06 NOTE — PROGRESS NOTES
"Chief Complaint  Follow-up (Patient was last seen in 2/24, she transition to another provider this year, but coming back to us in 11/24.)    Subjective      Hilary Xie presents to [facility]    History of Present Illness:  Patient pleasant 78-year-old female with underlying PAF, DM, osteoporosis on treatment, among others, seen 2/22 as a New Patient, who was last seen in 2/24, canceled her 3-month follow-up, seen by another provider this year, and coming back to us in 11/24.  We will review all her routine medications, go over her labs in detail, and address any new concerns as time permits.    ---> seen 3/23 for TCM:  COVID-19 pneumonia/multifocal pneumonia  Acute hypoxemic respiratory failure, increased work of breathing, satting 86% on room air  Atrial fibrillation on anticoagulation  Hyperglycemia =   I discussed with the patient that I did not want to send her home with insulin so we would trial glipizide 5 mg twice daily    Review of Systems   Constitutional:  Negative for appetite change, fatigue and fever.   HENT:  Negative for congestion and ear pain.    Eyes:  Negative for blurred vision.   Respiratory:  Negative for cough, chest tightness, shortness of breath and wheezing.    Cardiovascular:  Negative for chest pain, palpitations and leg swelling.   Gastrointestinal:  Negative for abdominal pain.   Genitourinary:  Negative for difficulty urinating, dysuria and hematuria.   Musculoskeletal:  Negative for arthralgias and gait problem.   Skin:  Negative for skin lesions.   Neurological:  Negative for syncope, memory problem and confusion.   Psychiatric/Behavioral:  Negative for self-injury and depressed mood.        Objective   Vital Signs:   /86 (BP Location: Left arm, Patient Position: Sitting, Cuff Size: Large Adult)   Pulse 70   Temp 96.6 °F (35.9 °C)   Resp 18   Ht 157.5 cm (62.01\")   Wt 71 kg (156 lb 9.6 oz)   SpO2 98%   BMI 28.64 kg/m²   "         Physical Exam  Vitals and nursing note reviewed.   Constitutional:       General: She is not in acute distress.     Appearance: Normal appearance. She is not toxic-appearing.   HENT:      Head: Atraumatic.      Right Ear: External ear normal.      Left Ear: External ear normal.      Nose: Nose normal.      Mouth/Throat:      Mouth: Mucous membranes are moist.   Eyes:      General:         Right eye: No discharge.         Left eye: No discharge.      Extraocular Movements: Extraocular movements intact.      Pupils: Pupils are equal, round, and reactive to light.   Cardiovascular:      Rate and Rhythm: Normal rate. Rhythm irregular.      Pulses: Normal pulses.      Heart sounds: Normal heart sounds. No murmur heard.     No gallop.      Comments: Heart tones irregularly irregular, no S3.  Pulmonary:      Effort: Pulmonary effort is normal. No respiratory distress.      Breath sounds: No wheezing, rhonchi or rales.      Comments: Lung fields clear bilaterally.  Abdominal:      General: There is no distension.      Palpations: Abdomen is soft. There is no mass.      Tenderness: There is no abdominal tenderness. There is no guarding.      Comments: No epigastric tenderness.   Musculoskeletal:         General: No swelling or tenderness.      Cervical back: No tenderness.      Right lower leg: No edema.      Left lower leg: No edema.   Skin:     General: Skin is warm and dry.      Findings: No rash.   Neurological:      General: No focal deficit present.      Mental Status: She is alert and oriented to person, place, and time. Mental status is at baseline.      Motor: No weakness.      Gait: Gait normal.   Psychiatric:         Mood and Affect: Mood normal.         Thought Content: Thought content normal.          Result Review   The following data was reviewed by: Clarence Martínez MD on 02/14/2022:  [x] Laboratory  [] Microbiology  [] Radiology  [] EKG/telemetry  [] Cardiology/Vascular  [] Pathology  [x] Old  records             Assessment and Plan   Diagnoses and all orders for this visit:    1. Type 2 diabetes mellitus with hyperglycemia, without long-term current use of insulin (Primary)  Assessment & Plan:  A1c was down to 5.7 in 7/24.  Believe she was still on low-dose glimepiride at that time, it is off her med list presently.  She is just on low-dose Jardiance.  Will see where she is today and make further recommendations.    Orders:  -     Hemoglobin A1c; Future    2. Hypertension, essential  Assessment & Plan:  Patient blood pressure stable and her pulse is 70 as of her 11/24 OV.  She is presently just on very low-dose metoprolol, stable to continue same.    Orders:  -     Magnesium; Future    3. Longstanding persistent atrial fibrillation  Overview:  Since 1980's.  Cardioverted x1.  Cardioverted recently as of 11/23 OV, not successful.  Followed by Dr. Chamberlain.      Assessment & Plan:  Patient's A-fib remains controlled, her pulse is right around 70 as of her 11/24 OV.  She is maintained on low-dose amiodarone, TSH was normal in 7/24, but we will repeat that now along with renal function as of her 11/24 OV.  She is on full dose Xarelto for stroke prevention as well as low-dose metoprolol for rate control, stable to continue same.      4. Mixed hyperlipidemia  Assessment & Plan:  LDL was 125 in 7/24, had previously been in the 80 ballpark which is reasonable for primary prevention.  She is on moderate dose Crestor based on her med list, will check nonfasting today and disregard her triglycerides.    Orders:  -     Lipid Panel; Future    5. Subclinical hypothyroidism  Assessment & Plan:  TSH was normal in 7/24, she currently not on any replacement, but she is on amiodarone, so we need to keep an eye on this.  Check today and make further recommendations after that.    Orders:  -     TSH; Future    6. Stage 3b chronic kidney disease  Assessment & Plan:  Patient's GFR was 44 in 7/24, as pretty much her baseline, we  are checking routine labs today as of her 11/24 OV, further recommendations if significantly different.    Orders:  -     Comprehensive Metabolic Panel; Future    7. Left hip pain  Overview:  Had MRI of L Hip in 3/21 with tear of labrum.    Plain films 11/23:     There is mild joint space narrowing in both the right and left hip.  There is marginal osteophyte formation present bilaterally, greater on the left.  No fractures are identified.  No destructive bone lesions are seen.    Assessment & Plan:  Patient is doing a little better this regards as of her 11/24 OV.  She was seen by physical therapy recently and feels that she has benefited.  Looks like she saw Dr. Del Toro previously, if flares up, she may benefit from injection therapy, will reach out to him at that time.      8. Major neurocognitive disorder  Overview:  Dewayne Kong MD  = note from 10/21 was reviewed.    Assessment & Plan:  Patient mainly bothered by sleep as of her 11/24 OV.  She is on 10 mg of Zyprexa as well as 100 mg of trazodone.      As noted below, she is compliant with the CPAP, but perhaps it needs to be adjusted.  Recommendations at this time would be to go up to 150 mg on the trazodone.      9. MARY on CPAP  Assessment & Plan:  Patient reports compliance with CPAP as of 11/24 OV.  However she is having some issues with sleep, it has been several years since her sleep apnea has been evaluated, so we will reach out to sleep lab at this time.    Orders:  -     Ambulatory Referral to Sleep Lab    10. Psychophysiological insomnia  -     traZODone (DESYREL) 150 MG tablet; Take 1 tablet by mouth Every Night.  Dispense: 90 tablet; Refill: 1                   Follow Up   Return in about 3 months (around 2/6/2025).  Patient was given instructions and counseling regarding her condition or for health maintenance advice. Please see specific information pulled into the AVS if appropriate.

## 2024-11-06 NOTE — ASSESSMENT & PLAN NOTE
LDL was 125 in 7/24, had previously been in the 80 ballpark which is reasonable for primary prevention.  She is on moderate dose Crestor based on her med list, will check nonfasting today and disregard her triglycerides.

## 2024-11-06 NOTE — PATIENT INSTRUCTIONS
1.  We will check some routine labs today, and give you a call if we need to make any changes.  Otherwise plan on seeing you again in about 3 months.    2.  We will increase your trazodone from 100 mg to 150 mg to see if this helps with your sleep.    3.  You can take 1-1/2 of your 100 mg tablets until they are gone, I sent 150 mg tablet to the pharmacy.    4.  I put a referral into the sleep lab as far as evaluating if your CPAP needs to be adjusted or not.

## 2024-11-06 NOTE — ASSESSMENT & PLAN NOTE
Patient is doing a little better this regards as of her 11/24 OV.  She was seen by physical therapy recently and feels that she has benefited.  Looks like she saw Dr. Del Toro previously, if flares up, she may benefit from injection therapy, will reach out to him at that time.

## 2024-11-06 NOTE — ASSESSMENT & PLAN NOTE
A1c was down to 5.7 in 7/24.  Believe she was still on low-dose glimepiride at that time, it is off her med list presently.  She is just on low-dose Jardiance.  Will see where she is today and make further recommendations.

## 2024-11-06 NOTE — ASSESSMENT & PLAN NOTE
Patient mainly bothered by sleep as of her 11/24 OV.  She is on 10 mg of Zyprexa as well as 100 mg of trazodone.      As noted below, she is compliant with the CPAP, but perhaps it needs to be adjusted.  Recommendations at this time would be to go up to 150 mg on the trazodone.

## 2024-11-06 NOTE — ASSESSMENT & PLAN NOTE
TSH was normal in 7/24, she currently not on any replacement, but she is on amiodarone, so we need to keep an eye on this.  Check today and make further recommendations after that.

## 2024-11-06 NOTE — ASSESSMENT & PLAN NOTE
Patient's GFR was 44 in 7/24, as pretty much her baseline, we are checking routine labs today as of her 11/24 OV, further recommendations if significantly different.

## 2024-11-06 NOTE — ASSESSMENT & PLAN NOTE
Patient blood pressure stable and her pulse is 70 as of her 11/24 OV.  She is presently just on very low-dose metoprolol, stable to continue same.

## 2024-11-07 ENCOUNTER — PATIENT ROUNDING (BHMG ONLY) (OUTPATIENT)
Dept: INTERNAL MEDICINE | Age: 79
End: 2024-11-07
Payer: MEDICARE

## 2024-11-13 RX ORDER — HYDROCODONE BITARTRATE AND ACETAMINOPHEN 5; 325 MG/1; MG/1
1 TABLET ORAL EVERY 8 HOURS PRN
Qty: 9 TABLET | Refills: 0 | OUTPATIENT
Start: 2024-11-13

## 2024-11-13 RX ORDER — CEPHALEXIN 500 MG/1
500 CAPSULE ORAL 3 TIMES DAILY
Qty: 21 CAPSULE | Refills: 0 | OUTPATIENT
Start: 2024-11-13

## 2024-11-13 RX ORDER — ONDANSETRON 4 MG/1
4 TABLET, ORALLY DISINTEGRATING ORAL EVERY 8 HOURS PRN
Qty: 14 TABLET | Refills: 0 | OUTPATIENT
Start: 2024-11-13

## 2024-12-18 RX ORDER — METOPROLOL TARTRATE 25 MG/1
TABLET, FILM COATED ORAL
Qty: 60 TABLET | Refills: 5 | Status: SHIPPED | OUTPATIENT
Start: 2024-12-18

## 2024-12-26 DIAGNOSIS — I48.11 LONGSTANDING PERSISTENT ATRIAL FIBRILLATION: ICD-10-CM

## 2024-12-26 RX ORDER — AMIODARONE HYDROCHLORIDE 200 MG/1
200 TABLET ORAL DAILY
Qty: 90 TABLET | Refills: 0 | Status: SHIPPED | OUTPATIENT
Start: 2024-12-26

## 2025-01-31 DIAGNOSIS — I48.11 LONGSTANDING PERSISTENT ATRIAL FIBRILLATION: ICD-10-CM

## 2025-01-31 DIAGNOSIS — E87.6 HYPOKALEMIA: ICD-10-CM

## 2025-02-04 RX ORDER — AMIODARONE HYDROCHLORIDE 200 MG/1
200 TABLET ORAL DAILY
Qty: 30 TABLET | Refills: 0 | Status: SHIPPED | OUTPATIENT
Start: 2025-02-04

## 2025-02-04 RX ORDER — POTASSIUM CHLORIDE 750 MG/1
10 TABLET, EXTENDED RELEASE ORAL 2 TIMES DAILY
Qty: 90 TABLET | Refills: 1 | Status: SHIPPED | OUTPATIENT
Start: 2025-02-04

## 2025-02-04 RX ORDER — RIVAROXABAN 20 MG/1
20 TABLET, FILM COATED ORAL
Qty: 90 TABLET | Refills: 1 | Status: SHIPPED | OUTPATIENT
Start: 2025-02-04

## 2025-02-24 ENCOUNTER — OFFICE VISIT (OUTPATIENT)
Age: 80
End: 2025-02-24
Payer: MEDICARE

## 2025-02-24 VITALS
OXYGEN SATURATION: 97 % | WEIGHT: 156.4 LBS | BODY MASS INDEX: 28.78 KG/M2 | DIASTOLIC BLOOD PRESSURE: 60 MMHG | SYSTOLIC BLOOD PRESSURE: 122 MMHG | HEIGHT: 62 IN | TEMPERATURE: 97.9 F | HEART RATE: 58 BPM

## 2025-02-24 DIAGNOSIS — F51.04 PSYCHOPHYSIOLOGICAL INSOMNIA: ICD-10-CM

## 2025-02-24 DIAGNOSIS — D50.9 IRON DEFICIENCY ANEMIA, UNSPECIFIED IRON DEFICIENCY ANEMIA TYPE: ICD-10-CM

## 2025-02-24 DIAGNOSIS — I10 HYPERTENSION, ESSENTIAL: Primary | ICD-10-CM

## 2025-02-24 DIAGNOSIS — N18.32 STAGE 3B CHRONIC KIDNEY DISEASE: ICD-10-CM

## 2025-02-24 DIAGNOSIS — E11.65 TYPE 2 DIABETES MELLITUS WITH HYPERGLYCEMIA, WITHOUT LONG-TERM CURRENT USE OF INSULIN: ICD-10-CM

## 2025-02-24 DIAGNOSIS — E03.8 SUBCLINICAL HYPOTHYROIDISM: ICD-10-CM

## 2025-02-24 DIAGNOSIS — I48.11 LONGSTANDING PERSISTENT ATRIAL FIBRILLATION: ICD-10-CM

## 2025-02-24 DIAGNOSIS — E78.2 MIXED HYPERLIPIDEMIA: ICD-10-CM

## 2025-02-24 DIAGNOSIS — F33.41 RECURRENT MAJOR DEPRESSION IN PARTIAL REMISSION: ICD-10-CM

## 2025-02-24 DIAGNOSIS — E55.9 VITAMIN D DEFICIENCY: ICD-10-CM

## 2025-02-24 PROCEDURE — 3078F DIAST BP <80 MM HG: CPT | Performed by: INTERNAL MEDICINE

## 2025-02-24 PROCEDURE — G2211 COMPLEX E/M VISIT ADD ON: HCPCS | Performed by: INTERNAL MEDICINE

## 2025-02-24 PROCEDURE — 99214 OFFICE O/P EST MOD 30 MIN: CPT | Performed by: INTERNAL MEDICINE

## 2025-02-24 PROCEDURE — 3074F SYST BP LT 130 MM HG: CPT | Performed by: INTERNAL MEDICINE

## 2025-02-24 RX ORDER — MIRTAZAPINE 7.5 MG/1
7.5 TABLET, FILM COATED ORAL NIGHTLY
Qty: 90 TABLET | Refills: 1 | Status: SHIPPED | OUTPATIENT
Start: 2025-02-24

## 2025-02-24 RX ORDER — SERTRALINE HYDROCHLORIDE 100 MG/1
100 TABLET, FILM COATED ORAL DAILY
Qty: 90 TABLET | Refills: 1 | Status: SHIPPED | OUTPATIENT
Start: 2025-02-24

## 2025-02-24 RX ORDER — TRAZODONE HYDROCHLORIDE 150 MG/1
150 TABLET ORAL NIGHTLY
Qty: 90 TABLET | Refills: 1 | Status: SHIPPED | OUTPATIENT
Start: 2025-02-24

## 2025-02-24 NOTE — ASSESSMENT & PLAN NOTE
Patient appears to be in sinus, pulse is right around 60, she has low-dose amiodarone on board as well as low-dose metoprolol.  Stable to continue same along with full dose Xarelto for stroke prevention.

## 2025-02-24 NOTE — ASSESSMENT & PLAN NOTE
A1c was 6.5 in 11/24, she is maintained on just low-dose Jardiance.  No labs as of her 2/25 OV, I think that is fine, we will repeat them here in the spring and make further recommendation.

## 2025-02-24 NOTE — ASSESSMENT & PLAN NOTE
Blood pressure is well-controlled and her pulse is right around 60 as of her 2/25 OV.  She is on low-dose metoprolol only, this is mainly for the A-fib, stable to continue same.

## 2025-02-24 NOTE — ASSESSMENT & PLAN NOTE
LDL was 104 in 10/24, that is at goal for primary prevention, she is on moderate dose Crestor and stable to continue same.

## 2025-02-24 NOTE — ASSESSMENT & PLAN NOTE
GFR was stable in the mid 40s when we saw her in 11/25, no labs as of 2/25, but will repeat them on return to office.

## 2025-02-24 NOTE — ASSESSMENT & PLAN NOTE
Only saw patient in 10/24, we titrated her trazodone from 100 to 150 mg.  Still with issues in regards to sleep as of her 2/25 OV.    She is just on 10 mg of Zyprexa, but unable to titrate this in light of amiodarone use.    We will add low-dose Remeron on, and back off from 150 to 100 on the sertraline.

## 2025-02-24 NOTE — ASSESSMENT & PLAN NOTE
TSH was 4.2 in 11/24.  Patient is clinically euthyroid, will repeat on return to office, need to be slow with this given her underlying A-fib.

## 2025-02-24 NOTE — PROGRESS NOTES
Chief Complaint  Diabetes, Follow-up (Pt states that she didn't have labs, she states that this is routine.), and Insomnia (Pt states that she has trouble staying a sleep. She can go to sleep but then wakes up. She has been out of her Trazodone, but she states that she can take it and still wakes up. )    Subjective      Hilary Xie presents to [facility]    History of Present Illness:  Patient pleasant 79-year-old female with underlying PAF, DM, osteoporosis on treatment, among others, seen 2/22 as a New Patient, who was last seen in 2/24, canceled her 3-month follow-up, seen by another provider this year, and coming back to us in 11/24---> patient being seen in 2/24 for routine 3-month follow-up.  We will review all her routine medications, go over her labs in detail, and address any new concerns as time permits.    ---> seen 3/23 for TCM:  COVID-19 pneumonia/multifocal pneumonia  Acute hypoxemic respiratory failure, increased work of breathing, satting 86% on room air  Atrial fibrillation on anticoagulation  Hyperglycemia =   I discussed with the patient that I did not want to send her home with insulin so we would trial glipizide 5 mg twice daily    Review of Systems   Constitutional:  Negative for appetite change, fatigue and fever.   HENT:  Negative for congestion and ear pain.    Eyes:  Negative for blurred vision.   Respiratory:  Negative for cough, chest tightness, shortness of breath and wheezing.    Cardiovascular:  Negative for chest pain, palpitations and leg swelling.   Gastrointestinal:  Negative for abdominal pain.   Genitourinary:  Negative for difficulty urinating, dysuria and hematuria.   Musculoskeletal:  Negative for arthralgias and gait problem.   Skin:  Negative for skin lesions.   Neurological:  Negative for syncope, memory problem and confusion.   Psychiatric/Behavioral:  Negative for self-injury and depressed mood.        Objective   Vital Signs:   BP  "122/60   Pulse 58   Temp 97.9 °F (36.6 °C) (Rectal)   Ht 157.5 cm (62.01\")   Wt 70.9 kg (156 lb 6.4 oz)   SpO2 97%   BMI 28.60 kg/m²           Physical Exam  Vitals and nursing note reviewed.   Constitutional:       General: She is not in acute distress.     Appearance: Normal appearance. She is not toxic-appearing.   HENT:      Head: Atraumatic.      Right Ear: External ear normal.      Left Ear: External ear normal.      Nose: Nose normal.      Mouth/Throat:      Mouth: Mucous membranes are moist.   Eyes:      General:         Right eye: No discharge.         Left eye: No discharge.      Extraocular Movements: Extraocular movements intact.      Pupils: Pupils are equal, round, and reactive to light.   Cardiovascular:      Rate and Rhythm: Normal rate. Rhythm irregular.      Pulses: Normal pulses.      Heart sounds: Normal heart sounds. No murmur heard.     No gallop.      Comments: Heart tones irregularly irregular, no S3.  Pulmonary:      Effort: Pulmonary effort is normal. No respiratory distress.      Breath sounds: No wheezing, rhonchi or rales.      Comments: Lung fields clear bilaterally.  Abdominal:      General: There is no distension.      Palpations: Abdomen is soft. There is no mass.      Tenderness: There is no abdominal tenderness. There is no guarding.      Comments: No epigastric tenderness.   Musculoskeletal:         General: No swelling or tenderness.      Cervical back: No tenderness.      Right lower leg: No edema.      Left lower leg: No edema.   Skin:     General: Skin is warm and dry.      Findings: No rash.   Neurological:      General: No focal deficit present.      Mental Status: She is alert and oriented to person, place, and time. Mental status is at baseline.      Motor: No weakness.      Gait: Gait normal.   Psychiatric:         Mood and Affect: Mood normal.         Thought Content: Thought content normal.          Result Review   The following data was reviewed by: Clarence Martínez, " MD on 02/14/2022:  [x] Laboratory  [] Microbiology  [] Radiology  [] EKG/telemetry  [] Cardiology/Vascular  [] Pathology  [x] Old records             Assessment and Plan   Diagnoses and all orders for this visit:    1. Hypertension, essential (Primary)  Assessment & Plan:  Blood pressure is well-controlled and her pulse is right around 60 as of her 2/25 OV.  She is on low-dose metoprolol only, this is mainly for the A-fib, stable to continue same.    Orders:  -     Comprehensive Metabolic Panel; Future    2. Psychophysiological insomnia  Assessment & Plan:  Only saw patient in 10/24, we titrated her trazodone from 100 to 150 mg.  Still with issues in regards to sleep as of her 2/25 OV.    She is just on 10 mg of Zyprexa, but unable to titrate this in light of amiodarone use.    We will add low-dose Remeron on, and back off from 150 to 100 on the sertraline.    Orders:  -     traZODone (DESYREL) 150 MG tablet; Take 1 tablet by mouth Every Night.  Dispense: 90 tablet; Refill: 1    3. Longstanding persistent atrial fibrillation  Overview:  Since 1980's.  Cardioverted x1.  Cardioverted recently as of 11/23 OV, not successful.  Followed by Dr. Chamberlain.      Assessment & Plan:  Patient appears to be in sinus, pulse is right around 60, she has low-dose amiodarone on board as well as low-dose metoprolol.  Stable to continue same along with full dose Xarelto for stroke prevention.      4. Type 2 diabetes mellitus with hyperglycemia, without long-term current use of insulin  Assessment & Plan:  A1c was 6.5 in 11/24, she is maintained on just low-dose Jardiance.  No labs as of her 2/25 OV, I think that is fine, we will repeat them here in the spring and make further recommendation.    Orders:  -     Hemoglobin A1c; Future  -     Microalbumin / Creatinine Urine Ratio - Urine, Clean Catch; Future    5. Stage 3b chronic kidney disease  Assessment & Plan:  GFR was stable in the mid 40s when we saw her in 11/25, no labs as of 2/25,  but will repeat them on return to office.    Orders:  -     CBC & Differential; Future    6. Subclinical hypothyroidism  Assessment & Plan:  TSH was 4.2 in 11/24.  Patient is clinically euthyroid, will repeat on return to office, need to be slow with this given her underlying A-fib.    Orders:  -     TSH; Future  -     T4, free; Future    7. Mixed hyperlipidemia  Assessment & Plan:  LDL was 104 in 10/24, that is at goal for primary prevention, she is on moderate dose Crestor and stable to continue same.    Orders:  -     Lipid Panel; Future    8. Iron deficiency anemia, unspecified iron deficiency anemia type  -     Iron Profile; Future  -     Ferritin; Future    9. Vitamin D deficiency  -     Vitamin D,25-Hydroxy; Future    10. Recurrent major depression in partial remission  -     sertraline (ZOLOFT) 100 MG tablet; Take 1 tablet by mouth Daily.  Dispense: 90 tablet; Refill: 1    Other orders  -     mirtazapine (REMERON) 7.5 MG tablet; Take 1 tablet by mouth Every Night.  Dispense: 90 tablet; Refill: 1          BMI is >= 25 and <30. (Overweight) The following options were offered after discussion;: none (medical contraindication)      Follow Up   No follow-ups on file.  Patient was given instructions and counseling regarding her condition or for health maintenance advice. Please see specific information pulled into the AVS if appropriate.

## 2025-03-05 DIAGNOSIS — N18.32 STAGE 3B CHRONIC KIDNEY DISEASE: ICD-10-CM

## 2025-03-05 DIAGNOSIS — E11.65 TYPE 2 DIABETES MELLITUS WITH HYPERGLYCEMIA, WITHOUT LONG-TERM CURRENT USE OF INSULIN: ICD-10-CM

## 2025-03-05 RX ORDER — EMPAGLIFLOZIN 10 MG/1
10 TABLET, FILM COATED ORAL DAILY
Qty: 30 TABLET | Refills: 3 | Status: SHIPPED | OUTPATIENT
Start: 2025-03-05

## 2025-03-08 DIAGNOSIS — I48.11 LONGSTANDING PERSISTENT ATRIAL FIBRILLATION: ICD-10-CM

## 2025-03-12 RX ORDER — AMIODARONE HYDROCHLORIDE 200 MG/1
200 TABLET ORAL DAILY
Qty: 30 TABLET | Refills: 5 | Status: SHIPPED | OUTPATIENT
Start: 2025-03-12

## 2025-05-03 ENCOUNTER — LAB (OUTPATIENT)
Facility: HOSPITAL | Age: 80
End: 2025-05-03
Payer: MEDICARE

## 2025-05-03 DIAGNOSIS — E11.65 TYPE 2 DIABETES MELLITUS WITH HYPERGLYCEMIA, WITHOUT LONG-TERM CURRENT USE OF INSULIN: ICD-10-CM

## 2025-05-03 DIAGNOSIS — E78.2 MIXED HYPERLIPIDEMIA: ICD-10-CM

## 2025-05-03 DIAGNOSIS — I10 HYPERTENSION, ESSENTIAL: ICD-10-CM

## 2025-05-03 DIAGNOSIS — E55.9 VITAMIN D DEFICIENCY: ICD-10-CM

## 2025-05-03 DIAGNOSIS — D50.9 IRON DEFICIENCY ANEMIA, UNSPECIFIED IRON DEFICIENCY ANEMIA TYPE: ICD-10-CM

## 2025-05-03 DIAGNOSIS — N18.32 STAGE 3B CHRONIC KIDNEY DISEASE: ICD-10-CM

## 2025-05-03 DIAGNOSIS — E03.8 SUBCLINICAL HYPOTHYROIDISM: ICD-10-CM

## 2025-05-03 LAB
25(OH)D3 SERPL-MCNC: 50.7 NG/ML (ref 30–100)
ALBUMIN SERPL-MCNC: 3.5 G/DL (ref 3.5–5.2)
ALBUMIN UR-MCNC: 1.7 MG/DL
ALBUMIN/GLOB SERPL: 1.1 G/DL
ALP SERPL-CCNC: 95 U/L (ref 39–117)
ALT SERPL W P-5'-P-CCNC: 43 U/L (ref 1–33)
ANION GAP SERPL CALCULATED.3IONS-SCNC: 11.1 MMOL/L (ref 5–15)
AST SERPL-CCNC: 56 U/L (ref 1–32)
BASOPHILS # BLD AUTO: 0.09 10*3/MM3 (ref 0–0.2)
BASOPHILS NFR BLD AUTO: 0.7 % (ref 0–1.5)
BILIRUB SERPL-MCNC: 0.7 MG/DL (ref 0–1.2)
BUN SERPL-MCNC: 20 MG/DL (ref 8–23)
BUN/CREAT SERPL: 13.5 (ref 7–25)
CALCIUM SPEC-SCNC: 9.6 MG/DL (ref 8.6–10.5)
CHLORIDE SERPL-SCNC: 106 MMOL/L (ref 98–107)
CHOLEST SERPL-MCNC: 202 MG/DL (ref 0–200)
CO2 SERPL-SCNC: 24.9 MMOL/L (ref 22–29)
CREAT SERPL-MCNC: 1.48 MG/DL (ref 0.57–1)
CREAT UR-MCNC: 133.8 MG/DL
DEPRECATED RDW RBC AUTO: 41.5 FL (ref 37–54)
EGFRCR SERPLBLD CKD-EPI 2021: 35.7 ML/MIN/1.73
EOSINOPHIL # BLD AUTO: 0.3 10*3/MM3 (ref 0–0.4)
EOSINOPHIL NFR BLD AUTO: 2.4 % (ref 0.3–6.2)
ERYTHROCYTE [DISTWIDTH] IN BLOOD BY AUTOMATED COUNT: 12.9 % (ref 12.3–15.4)
FERRITIN SERPL-MCNC: 153 NG/ML (ref 13–150)
GLOBULIN UR ELPH-MCNC: 3.1 GM/DL
GLUCOSE SERPL-MCNC: 123 MG/DL (ref 65–99)
HBA1C MFR BLD: 6.4 % (ref 4.8–5.6)
HCT VFR BLD AUTO: 48.9 % (ref 34–46.6)
HDLC SERPL-MCNC: 64 MG/DL (ref 40–60)
HGB BLD-MCNC: 15.4 G/DL (ref 12–15.9)
IMM GRANULOCYTES # BLD AUTO: 0.06 10*3/MM3 (ref 0–0.05)
IMM GRANULOCYTES NFR BLD AUTO: 0.5 % (ref 0–0.5)
IRON 24H UR-MRATE: 72 MCG/DL (ref 37–145)
IRON SATN MFR SERPL: 24 % (ref 20–50)
LDLC SERPL CALC-MCNC: 112 MG/DL (ref 0–100)
LDLC/HDLC SERPL: 1.68 {RATIO}
LYMPHOCYTES # BLD AUTO: 3.12 10*3/MM3 (ref 0.7–3.1)
LYMPHOCYTES NFR BLD AUTO: 25.2 % (ref 19.6–45.3)
MCH RBC QN AUTO: 27.9 PG (ref 26.6–33)
MCHC RBC AUTO-ENTMCNC: 31.5 G/DL (ref 31.5–35.7)
MCV RBC AUTO: 88.7 FL (ref 79–97)
MICROALBUMIN/CREAT UR: 12.7 MG/G (ref 0–29)
MONOCYTES # BLD AUTO: 0.79 10*3/MM3 (ref 0.1–0.9)
MONOCYTES NFR BLD AUTO: 6.4 % (ref 5–12)
NEUTROPHILS NFR BLD AUTO: 64.8 % (ref 42.7–76)
NEUTROPHILS NFR BLD AUTO: 8.01 10*3/MM3 (ref 1.7–7)
NRBC BLD AUTO-RTO: 0 /100 WBC (ref 0–0.2)
PLATELET # BLD AUTO: 235 10*3/MM3 (ref 140–450)
PMV BLD AUTO: 12.1 FL (ref 6–12)
POTASSIUM SERPL-SCNC: 4.4 MMOL/L (ref 3.5–5.2)
PROT SERPL-MCNC: 6.6 G/DL (ref 6–8.5)
RBC # BLD AUTO: 5.51 10*6/MM3 (ref 3.77–5.28)
SODIUM SERPL-SCNC: 142 MMOL/L (ref 136–145)
T4 FREE SERPL-MCNC: 1.52 NG/DL (ref 0.92–1.68)
TIBC SERPL-MCNC: 305 MCG/DL (ref 298–536)
TRANSFERRIN SERPL-MCNC: 205 MG/DL (ref 200–360)
TRIGL SERPL-MCNC: 152 MG/DL (ref 0–150)
TSH SERPL DL<=0.05 MIU/L-ACNC: 7.91 UIU/ML (ref 0.27–4.2)
VLDLC SERPL-MCNC: 26 MG/DL (ref 5–40)
WBC NRBC COR # BLD AUTO: 12.37 10*3/MM3 (ref 3.4–10.8)

## 2025-05-03 PROCEDURE — 82043 UR ALBUMIN QUANTITATIVE: CPT

## 2025-05-03 PROCEDURE — 84466 ASSAY OF TRANSFERRIN: CPT

## 2025-05-03 PROCEDURE — 84439 ASSAY OF FREE THYROXINE: CPT

## 2025-05-03 PROCEDURE — 36415 COLL VENOUS BLD VENIPUNCTURE: CPT

## 2025-05-03 PROCEDURE — 82728 ASSAY OF FERRITIN: CPT

## 2025-05-03 PROCEDURE — 82570 ASSAY OF URINE CREATININE: CPT

## 2025-05-03 PROCEDURE — 80053 COMPREHEN METABOLIC PANEL: CPT

## 2025-05-03 PROCEDURE — 82306 VITAMIN D 25 HYDROXY: CPT

## 2025-05-03 PROCEDURE — 84443 ASSAY THYROID STIM HORMONE: CPT

## 2025-05-03 PROCEDURE — 83036 HEMOGLOBIN GLYCOSYLATED A1C: CPT

## 2025-05-03 PROCEDURE — 83540 ASSAY OF IRON: CPT

## 2025-05-03 PROCEDURE — 85025 COMPLETE CBC W/AUTO DIFF WBC: CPT

## 2025-05-03 PROCEDURE — 80061 LIPID PANEL: CPT

## 2025-05-05 ENCOUNTER — RESULTS FOLLOW-UP (OUTPATIENT)
Facility: HOSPITAL | Age: 80
End: 2025-05-05
Payer: MEDICARE

## 2025-05-05 DIAGNOSIS — E03.8 SUBCLINICAL HYPOTHYROIDISM: Primary | ICD-10-CM

## 2025-05-05 DIAGNOSIS — N18.32 STAGE 3B CHRONIC KIDNEY DISEASE: ICD-10-CM

## 2025-05-06 DIAGNOSIS — R41.82 ALTERED MENTAL STATUS, UNSPECIFIED ALTERED MENTAL STATUS TYPE: Primary | ICD-10-CM

## 2025-05-15 LAB
BACTERIA UR QL AUTO: ABNORMAL /HPF
BILIRUB UR QL STRIP: NEGATIVE
CLARITY UR: ABNORMAL
COLOR UR: YELLOW
GLUCOSE UR STRIP-MCNC: ABNORMAL MG/DL
HGB UR QL STRIP.AUTO: ABNORMAL
HOLD SPECIMEN: NORMAL
HYALINE CASTS UR QL AUTO: ABNORMAL /LPF
KETONES UR QL STRIP: NEGATIVE
LEUKOCYTE ESTERASE UR QL STRIP.AUTO: ABNORMAL
NITRITE UR QL STRIP: POSITIVE
PH UR STRIP.AUTO: 6 [PH] (ref 5–8)
PROT UR QL STRIP: ABNORMAL
RBC # UR STRIP: ABNORMAL /HPF
REF LAB TEST METHOD: ABNORMAL
SP GR UR STRIP: 1.03 (ref 1–1.03)
SQUAMOUS #/AREA URNS HPF: ABNORMAL /HPF
UROBILINOGEN UR QL STRIP: ABNORMAL
WBC # UR STRIP: ABNORMAL /HPF

## 2025-05-15 PROCEDURE — 87077 CULTURE AEROBIC IDENTIFY: CPT | Performed by: INTERNAL MEDICINE

## 2025-05-15 PROCEDURE — 87086 URINE CULTURE/COLONY COUNT: CPT | Performed by: INTERNAL MEDICINE

## 2025-05-15 PROCEDURE — 81001 URINALYSIS AUTO W/SCOPE: CPT | Performed by: INTERNAL MEDICINE

## 2025-05-16 ENCOUNTER — RESULTS FOLLOW-UP (OUTPATIENT)
Age: 80
End: 2025-05-16
Payer: MEDICARE

## 2025-05-16 NOTE — TELEPHONE ENCOUNTER
[facility] WAS UNABLE TO WARM TRANSFER       Caller: lesley jordan    Relationship: Emergency Contact    Best call back number: [phone]     What is the best time to reach you: ANYTIME     Who are you requesting to speak with (clinical staff, provider,  specific staff member): CLINICAL    What was the call regarding: L[facility]LEY IS CALL RETURNING A CALL FOR TEST RESULTS, LESLEY WAS PRIOR LISTED ON  VERBALS BUT IS NEEDING TO UPDATE WITHIN THE [facility] OFFICE, [facility] DID ADVISE.

## 2025-05-18 LAB — BACTERIA SPEC AEROBE CULT: ABNORMAL

## 2025-05-26 DIAGNOSIS — F33.41 RECURRENT MAJOR DEPRESSION IN PARTIAL REMISSION: ICD-10-CM

## 2025-05-27 ENCOUNTER — LAB (OUTPATIENT)
Facility: HOSPITAL | Age: 80
End: 2025-05-27
Payer: MEDICARE

## 2025-05-27 DIAGNOSIS — E03.8 SUBCLINICAL HYPOTHYROIDISM: ICD-10-CM

## 2025-05-27 DIAGNOSIS — N18.32 STAGE 3B CHRONIC KIDNEY DISEASE: ICD-10-CM

## 2025-05-27 LAB
T4 FREE SERPL-MCNC: 1.29 NG/DL (ref 0.92–1.68)
TSH SERPL DL<=0.05 MIU/L-ACNC: 3.3 UIU/ML (ref 0.27–4.2)

## 2025-05-27 PROCEDURE — 84439 ASSAY OF FREE THYROXINE: CPT

## 2025-05-27 PROCEDURE — 36415 COLL VENOUS BLD VENIPUNCTURE: CPT

## 2025-05-27 PROCEDURE — 84443 ASSAY THYROID STIM HORMONE: CPT

## 2025-05-28 RX ORDER — SERTRALINE HYDROCHLORIDE 100 MG/1
150 TABLET, FILM COATED ORAL DAILY
Qty: 135 TABLET | Refills: 1 | Status: SHIPPED | OUTPATIENT
Start: 2025-05-28

## 2025-05-29 ENCOUNTER — OFFICE VISIT (OUTPATIENT)
Age: 80
End: 2025-05-29
Payer: MEDICARE

## 2025-05-29 VITALS
SYSTOLIC BLOOD PRESSURE: 122 MMHG | DIASTOLIC BLOOD PRESSURE: 64 MMHG | HEART RATE: 50 BPM | WEIGHT: 153.6 LBS | OXYGEN SATURATION: 98 % | HEIGHT: 62 IN | BODY MASS INDEX: 28.26 KG/M2

## 2025-05-29 DIAGNOSIS — E03.8 SUBCLINICAL HYPOTHYROIDISM: ICD-10-CM

## 2025-05-29 DIAGNOSIS — I48.11 LONGSTANDING PERSISTENT ATRIAL FIBRILLATION: ICD-10-CM

## 2025-05-29 DIAGNOSIS — E55.9 VITAMIN D DEFICIENCY: ICD-10-CM

## 2025-05-29 DIAGNOSIS — N18.32 STAGE 3B CHRONIC KIDNEY DISEASE: ICD-10-CM

## 2025-05-29 DIAGNOSIS — I10 HYPERTENSION, ESSENTIAL: ICD-10-CM

## 2025-05-29 DIAGNOSIS — E78.2 MIXED HYPERLIPIDEMIA: ICD-10-CM

## 2025-05-29 DIAGNOSIS — M81.0 AGE-RELATED OSTEOPOROSIS WITHOUT CURRENT PATHOLOGICAL FRACTURE: ICD-10-CM

## 2025-05-29 DIAGNOSIS — R06.09 DYSPNEA ON EXERTION: ICD-10-CM

## 2025-05-29 DIAGNOSIS — E11.65 TYPE 2 DIABETES MELLITUS WITH HYPERGLYCEMIA, WITHOUT LONG-TERM CURRENT USE OF INSULIN: Primary | ICD-10-CM

## 2025-05-29 DIAGNOSIS — M25.552 LEFT HIP PAIN: ICD-10-CM

## 2025-05-29 NOTE — ASSESSMENT & PLAN NOTE
TSH was up from 4.2 to 7.9 per her labs at the first of this month.  We repeated it just prior to her 5/25 OV, and the follow-up level was excellent at 3.3 without any treatment, certainly none indicated presently.  Will continue to remain conservative this regards given her underlying A-fib.

## 2025-05-29 NOTE — ASSESSMENT & PLAN NOTE
Patient appears to be in sinus as of her 5/25 OV, pulse is right around 60, she has low-dose amiodarone on board as well as low-dose metoprolol.  Stable to continue same along with full dose Xarelto for stroke prevention.

## 2025-05-29 NOTE — ASSESSMENT & PLAN NOTE
Patient having some increased left hip pain unfortunately as of her 5/25 OV.  Patient reports being told she had a crack in her hip previously, I think she is referring to the tear of her labrum as noted in the MRI above.    Recommend patient consider taking 1-2 Tylenol arthritis every a.m. and then she can utilize up to 4 more during the day if needed.  This would be the first line of treatment, we could entertain low-dose tramadol later if not beneficial.

## 2025-05-29 NOTE — ASSESSMENT & PLAN NOTE
A1c is excellent at 6.4 as of her 5/25 OV.  She is just on low-dose Jardiance, things may be on board simply for the CKD 3B, regardless, certainly appropriate to continue same.

## 2025-05-29 NOTE — ASSESSMENT & PLAN NOTE
LDL is up some to 112 as of her 5/25 OV, she is being treated for primary prevention, she is on moderate dose Crestor, I think we are fine continuing same.

## 2025-05-29 NOTE — ASSESSMENT & PLAN NOTE
Blood pressure is well-controlled and her pulse is right around 50 as of her 5/25 OV.  She is on low-dose metoprolol only, this is mainly for the A-fib, stable to continue same.

## 2025-05-29 NOTE — ASSESSMENT & PLAN NOTE
Patient GFR is down little further to 36 as of her 5/25 OV.  Electrolytes are fine, there is no acidosis, blood pressure is controlled, as is her sugar, so no treatment indicated, will follow-up labs on return to office.    Additionally this may be off a little bit due to an UTI around the same time as the blood draw.

## 2025-06-19 DIAGNOSIS — E87.6 HYPOKALEMIA: ICD-10-CM

## 2025-06-19 RX ORDER — POTASSIUM CHLORIDE 750 MG/1
10 TABLET, EXTENDED RELEASE ORAL 2 TIMES DAILY
Qty: 90 TABLET | Refills: 0 | Status: SHIPPED | OUTPATIENT
Start: 2025-06-19

## 2025-06-19 RX ORDER — FERROUS SULFATE 325(65) MG
1 TABLET ORAL DAILY
Qty: 100 TABLET | Refills: 0 | Status: SHIPPED | OUTPATIENT
Start: 2025-06-19

## 2025-08-02 DIAGNOSIS — F51.04 PSYCHOPHYSIOLOGICAL INSOMNIA: ICD-10-CM

## 2025-08-02 DIAGNOSIS — F03.90 MAJOR NEUROCOGNITIVE DISORDER: ICD-10-CM

## 2025-08-04 RX ORDER — ROSUVASTATIN CALCIUM 20 MG/1
20 TABLET, COATED ORAL DAILY
Qty: 90 TABLET | Refills: 1 | Status: SHIPPED | OUTPATIENT
Start: 2025-08-04

## 2025-08-04 RX ORDER — OLANZAPINE 10 MG/1
10 TABLET, FILM COATED ORAL NIGHTLY
Qty: 90 TABLET | Refills: 1 | Status: SHIPPED | OUTPATIENT
Start: 2025-08-04

## 2025-08-10 ENCOUNTER — HOSPITAL ENCOUNTER (EMERGENCY)
Facility: HOSPITAL | Age: 80
Discharge: HOME | End: 2025-08-10
Attending: EMERGENCY MEDICINE | Admitting: EMERGENCY MEDICINE
Payer: MEDICARE

## 2025-08-10 ENCOUNTER — APPOINTMENT (OUTPATIENT)
Dept: GENERAL RADIOLOGY | Facility: HOSPITAL | Age: 80
End: 2025-08-10
Payer: MEDICARE

## 2025-08-10 ENCOUNTER — APPOINTMENT (OUTPATIENT)
Dept: CT IMAGING | Facility: HOSPITAL | Age: 80
End: 2025-08-10
Payer: MEDICARE

## 2025-08-10 VITALS
HEART RATE: 45 BPM | WEIGHT: 173.72 LBS | TEMPERATURE: 98.4 F | SYSTOLIC BLOOD PRESSURE: 130 MMHG | HEIGHT: 62 IN | BODY MASS INDEX: 31.97 KG/M2 | OXYGEN SATURATION: 97 % | DIASTOLIC BLOOD PRESSURE: 51 MMHG | RESPIRATION RATE: 18 BRPM

## 2025-08-10 DIAGNOSIS — S70.02XA CONTUSION OF LEFT HIP, INITIAL ENCOUNTER: ICD-10-CM

## 2025-08-10 DIAGNOSIS — R00.1 BRADYCARDIA: ICD-10-CM
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
## 2025-08-10 LAB
ALBUMIN SERPL-MCNC: 3.1 G/DL (ref 3.5–5.2)
ALBUMIN/GLOB SERPL: 1.2 G/DL
ALP SERPL-CCNC: 73 U/L (ref 39–117)
ALT SERPL W P-5'-P-CCNC: 46 U/L (ref 1–33)
ANION GAP SERPL CALCULATED.3IONS-SCNC: 9.2 MMOL/L (ref 5–15)
AST SERPL-CCNC: 51 U/L (ref 1–32)
BASOPHILS # BLD AUTO: 0.05 10*3/MM3 (ref 0–0.2)
BASOPHILS NFR BLD AUTO: 0.7 % (ref 0–1.5)
BILIRUB SERPL-MCNC: 0.5 MG/DL (ref 0–1.2)
BUN SERPL-MCNC: 9.4 MG/DL (ref 8–23)
BUN/CREAT SERPL: 7.4 (ref 7–25)
CALCIUM SPEC-SCNC: 9.1 MG/DL (ref 8.6–10.5)
CHLORIDE SERPL-SCNC: 110 MMOL/L (ref 98–107)
CO2 SERPL-SCNC: 22.8 MMOL/L (ref 22–29)
CREAT SERPL-MCNC: 1.27 MG/DL (ref 0.57–1)
DEPRECATED RDW RBC AUTO: 45.3 FL (ref 37–54)
EGFRCR SERPLBLD CKD-EPI 2021: 42.6 ML/MIN/1.73
EOSINOPHIL # BLD AUTO: 0.1 10*3/MM3 (ref 0–0.4)
EOSINOPHIL NFR BLD AUTO: 1.4 % (ref 0.3–6.2)
ERYTHROCYTE [DISTWIDTH] IN BLOOD BY AUTOMATED COUNT: 13.9 % (ref 12.3–15.4)
GLOBULIN UR ELPH-MCNC: 2.6 GM/DL
GLUCOSE BLDC GLUCOMTR-MCNC: 171 MG/DL (ref 70–99)
GLUCOSE SERPL-MCNC: 125 MG/DL (ref 65–99)
HCT VFR BLD AUTO: 40.6 % (ref 34–46.6)
HGB BLD-MCNC: 13 G/DL (ref 12–15.9)
HOLD SPECIMEN: NORMAL
HOLD SPECIMEN: NORMAL
IMM GRANULOCYTES # BLD AUTO: 0.03 10*3/MM3 (ref 0–0.05)
IMM GRANULOCYTES NFR BLD AUTO: 0.4 % (ref 0–0.5)
LYMPHOCYTES # BLD AUTO: 1.4 10*3/MM3 (ref 0.7–3.1)
LYMPHOCYTES NFR BLD AUTO: 19.3 % (ref 19.6–45.3)
MAGNESIUM SERPL-MCNC: 2 MG/DL (ref 1.6–2.4)
MCH RBC QN AUTO: 28.6 PG (ref 26.6–33)
MCHC RBC AUTO-ENTMCNC: 32 G/DL (ref 31.5–35.7)
MCV RBC AUTO: 89.4 FL (ref 79–97)
MONOCYTES # BLD AUTO: 0.43 10*3/MM3 (ref 0.1–0.9)
MONOCYTES NFR BLD AUTO: 5.9 % (ref 5–12)
NEUTROPHILS NFR BLD AUTO: 5.25 10*3/MM3 (ref 1.7–7)
NEUTROPHILS NFR BLD AUTO: 72.3 % (ref 42.7–76)
NRBC BLD AUTO-RTO: 0 /100 WBC (ref 0–0.2)
PLATELET # BLD AUTO: 129 10*3/MM3 (ref 140–450)
PMV BLD AUTO: 11.9 FL (ref 6–12)
POTASSIUM SERPL-SCNC: 4.1 MMOL/L (ref 3.5–5.2)
PROT SERPL-MCNC: 5.7 G/DL (ref 6–8.5)
RBC # BLD AUTO: 4.54 10*6/MM3 (ref 3.77–5.28)
SODIUM SERPL-SCNC: 142 MMOL/L (ref 136–145)
TROPONIN T SERPL HS-MCNC: 10 NG/L
WBC NRBC COR # BLD AUTO: 7.26 10*3/MM3 (ref 3.4–10.8)
WHOLE BLOOD HOLD COAG: NORMAL
WHOLE BLOOD HOLD SPECIMEN: NORMAL

## 2025-08-10 PROCEDURE — 99284 EMERGENCY DEPT VISIT MOD MDM: CPT

## 2025-08-10 PROCEDURE — 36415 COLL VENOUS BLD VENIPUNCTURE: CPT | Performed by: EMERGENCY MEDICINE

## 2025-08-10 PROCEDURE — 70450 CT HEAD/BRAIN W/O DYE: CPT

## 2025-08-10 PROCEDURE — 82948 REAGENT STRIP/BLOOD GLUCOSE: CPT

## 2025-08-10 PROCEDURE — 71045 X-RAY EXAM CHEST 1 VIEW: CPT

## 2025-08-10 PROCEDURE — 93005 ELECTROCARDIOGRAM TRACING: CPT | Performed by: EMERGENCY MEDICINE

## 2025-08-10 PROCEDURE — 83735 ASSAY OF MAGNESIUM: CPT | Performed by: EMERGENCY MEDICINE

## 2025-08-10 PROCEDURE — 84484 ASSAY OF TROPONIN QUANT: CPT | Performed by: EMERGENCY MEDICINE

## 2025-08-10 PROCEDURE — 73502 X-RAY EXAM HIP UNI 2-3 VIEWS: CPT

## 2025-08-10 PROCEDURE — 80053 COMPREHEN METABOLIC PANEL: CPT | Performed by: EMERGENCY MEDICINE

## 2025-08-10 PROCEDURE — 85025 COMPLETE CBC W/AUTO DIFF WBC: CPT | Performed by: EMERGENCY MEDICINE

## 2025-08-10 RX ORDER — SODIUM CHLORIDE 0.9 % (FLUSH) 0.9 %
10 SYRINGE (ML) INJECTION AS NEEDED
Status: DISCONTINUED | OUTPATIENT
Start: 2025-08-10 | End: 2025-08-10 | Stop reason: HOSPADM

## 2025-08-12 LAB
QT INTERVAL: 503 MS
QTC INTERVAL: 426 MS

## 2025-08-12 RX ORDER — RIVAROXABAN 20 MG/1
20 TABLET, FILM COATED ORAL
Qty: 90 TABLET | Refills: 1 | Status: SHIPPED | OUTPATIENT
Start: 2025-08-12

## 2025-08-21 ENCOUNTER — LAB (OUTPATIENT)
Facility: HOSPITAL | Age: 80
End: 2025-08-21
Payer: MEDICARE